# Patient Record
Sex: FEMALE | Race: WHITE | NOT HISPANIC OR LATINO | Employment: OTHER | URBAN - METROPOLITAN AREA
[De-identification: names, ages, dates, MRNs, and addresses within clinical notes are randomized per-mention and may not be internally consistent; named-entity substitution may affect disease eponyms.]

---

## 2015-04-20 LAB
ALT SERPL-CCNC: 25 U/L (ref 6–29)
AST SERPL-CCNC: 18 U/L (ref 10–35)
CHOLEST SERPL-MCNC: 175 MG/DL (ref 125–200)
CREAT SERPL-MCNC: 1.08 MG/DL (ref 0.5–0.99)
GFR SERPL CREATININE-BSD FRML MDRD: 55 ML/MIN/1.73M2
GLUCOSE SERPL-MCNC: 101 MG/DL (ref 65–99)
HDLC SERPL-MCNC: 87 MG/DL
LDLC SERPL CALC-MCNC: 64 MG/DL
NONHDLC SERPL-MCNC: 88 MG/DL
POTASSIUM SERPL-SCNC: 4.6 MMOL/L (ref 3.5–5.3)
TRIGL SERPL-MCNC: 121 MG/DL
TSH SERPL-ACNC: 1.05 MIU/L (ref 0.4–4.5)

## 2015-10-21 LAB
ALT SERPL-CCNC: 25 U/L (ref 6–29)
AST SERPL-CCNC: 22 U/L (ref 10–35)
CHOLEST SERPL-MCNC: 171 MG/DL (ref 125–200)
CREAT SERPL-MCNC: 1.06 MG/DL (ref 0.5–0.99)
GFR SERPL CREATININE-BSD FRML MDRD: 55 ML/MIN/1.73M2
GLUCOSE SERPL-MCNC: 100 MG/DL (ref 65–99)
HDLC SERPL-MCNC: 86 MG/DL
LDLC SERPL CALC-MCNC: 66 MG/DL
NONHDLC SERPL-MCNC: 85 MG/DL
POTASSIUM SERPL-SCNC: 4.5 MMOL/L (ref 3.5–5.3)
TRIGL SERPL-MCNC: 97 MG/DL
TSH SERPL-ACNC: 1.77 MIU/L (ref 0.4–4.5)

## 2016-03-03 LAB
ALT SERPL-CCNC: 24 U/L (ref 6–29)
AST SERPL-CCNC: 19 U/L (ref 10–35)
CHOLEST SERPL-MCNC: 170 MG/DL (ref 125–200)
CREAT SERPL-MCNC: 1 MG/DL (ref 0.5–0.99)
GFR SERPL CREATININE-BSD FRML MDRD: 59 ML/MIN/1.73M2
GLUCOSE SERPL-MCNC: 106 MG/DL (ref 65–99)
HDLC SERPL-MCNC: 92 MG/DL
LDLC SERPL CALC-MCNC: 59 MG/DL
NONHDLC SERPL-MCNC: 78 MG/DL
POTASSIUM SERPL-SCNC: 4.7 MMOL/L (ref 3.5–5.3)
TRIGL SERPL-MCNC: 97 MG/DL
TSH SERPL-ACNC: 1.6 MIU/L (ref 0.4–4.5)

## 2016-07-29 LAB
ALT SERPL-CCNC: 21 U/L (ref 6–29)
AST SERPL-CCNC: 18 U/L (ref 10–35)
CHOLEST SERPL-MCNC: 185 MG/DL (ref 125–200)
CREAT SERPL-MCNC: 1 MG/DL (ref 0.5–0.99)
GFR SERPL CREATININE-BSD FRML MDRD: 59 ML/MIN/1.73M2
GLUCOSE SERPL-MCNC: 103 MG/DL (ref 65–99)
HDLC SERPL-MCNC: 93 MG/DL
LDLC SERPL CALC-MCNC: 68 MG/DL
NONHDLC SERPL-MCNC: 92 MG/DL
POTASSIUM SERPL-SCNC: 4.6 MMOL/L (ref 3.5–5.3)
TRIGL SERPL-MCNC: 118 MG/DL
TSH SERPL-ACNC: 1.04 MIU/L (ref 0.4–4.5)

## 2016-10-14 LAB
ALT SERPL-CCNC: 19 U/L (ref 6–29)
AST SERPL-CCNC: 15 U/L (ref 10–35)
CHOLEST SERPL-MCNC: 170 MG/DL (ref 125–200)
CREAT SERPL-MCNC: 1.03 MG/DL (ref 0.5–0.99)
GFR SERPL CREATININE-BSD FRML MDRD: 57 ML/MIN/1.73M2
GLUCOSE SERPL-MCNC: 102 MG/DL (ref 65–99)
HDLC SERPL-MCNC: 80 MG/DL
LDLC SERPL CALC-MCNC: 69 MG/DL
NONHDLC SERPL-MCNC: 90 MG/DL
POTASSIUM SERPL-SCNC: 4.4 MMOL/L (ref 3.5–5.3)
TRIGL SERPL-MCNC: 106 MG/DL

## 2016-11-29 LAB
ALT SERPL-CCNC: 17 U/L (ref 6–29)
AST SERPL-CCNC: 17 U/L (ref 10–35)
CHOLEST SERPL-MCNC: 185 MG/DL (ref 125–200)
CREAT SERPL-MCNC: 1.09 MG/DL (ref 0.5–0.99)
GFR SERPL CREATININE-BSD FRML MDRD: 53 ML/MIN/1.73M2
GLUCOSE SERPL-MCNC: 101 MG/DL (ref 65–99)
HDLC SERPL-MCNC: 90 MG/DL
LDLC SERPL CALC-MCNC: 72 MG/DL
NONHDLC SERPL-MCNC: 95 MG/DL
POTASSIUM SERPL-SCNC: 4.5 MMOL/L (ref 3.5–5.3)
TRIGL SERPL-MCNC: 116 MG/DL
TSH SERPL-ACNC: 1.37 MIU/L (ref 0.4–4.5)

## 2017-02-10 ENCOUNTER — TRANSFERRED RECORDS (OUTPATIENT)
Dept: HEALTH INFORMATION MANAGEMENT | Facility: CLINIC | Age: 67
End: 2017-02-10

## 2017-03-30 LAB
ALT SERPL-CCNC: 21 U/L (ref 6–29)
AST SERPL-CCNC: 18 U/L (ref 10–35)
CHOLEST SERPL-MCNC: 155 MG/DL (ref 125–200)
CREAT SERPL-MCNC: 1.07 MG/DL (ref 0.5–0.99)
GFR SERPL CREATININE-BSD FRML MDRD: 54 ML/MIN/1.73M2
GLUCOSE SERPL-MCNC: 105 MG/DL (ref 65–99)
HDLC SERPL-MCNC: 84 MG/DL
LDLC SERPL CALC-MCNC: 48 MG/DL
NONHDLC SERPL-MCNC: 71 MG/DL
POTASSIUM SERPL-SCNC: 4.6 MMOL/L (ref 3.5–5.3)
TRIGL SERPL-MCNC: 116 MG/DL
TSH SERPL-ACNC: 1.87 MIU/L (ref 0.4–4.5)

## 2017-07-10 LAB
ALT SERPL-CCNC: 27 U/L (ref 6–29)
AST SERPL-CCNC: 19 U/L (ref 10–35)
CHOLEST SERPL-MCNC: 142 MG/DL (ref 125–200)
CREAT SERPL-MCNC: 1.08 MG/DL (ref 0.5–0.99)
GFR SERPL CREATININE-BSD FRML MDRD: 54 ML/MIN/1.73M2
GLUCOSE SERPL-MCNC: 105 MG/DL (ref 65–99)
HDLC SERPL-MCNC: 84 MG/DL
LDLC SERPL CALC-MCNC: 42 MG/DL
NONHDLC SERPL-MCNC: 58 MG/DL
POTASSIUM SERPL-SCNC: 4.6 MMOL/L (ref 3.5–5.3)
TRIGL SERPL-MCNC: 78 MG/DL
TSH SERPL-ACNC: 1.21 MIU/L (ref 0.4–4.5)

## 2017-11-17 LAB
ALT SERPL-CCNC: 20 U/L (ref 6–29)
AST SERPL-CCNC: 16 U/L (ref 10–35)
CHOLEST SERPL-MCNC: 173 MG/DL
CREAT SERPL-MCNC: 1.06 MG/DL (ref 0.5–0.99)
GFR SERPL CREATININE-BSD FRML MDRD: 55 ML/MIN/1.73M2
GLUCOSE SERPL-MCNC: 95 MG/DL (ref 65–99)
HDLC SERPL-MCNC: 85 MG/DL
LDLC SERPL CALC-MCNC: 70 MG/DL
NONHDLC SERPL-MCNC: 88 MG/DL
POTASSIUM SERPL-SCNC: 4.6 MMOL/L (ref 3.5–5.3)
TRIGL SERPL-MCNC: 96 MG/DL
TSH SERPL-ACNC: 1.38 MIU/L (ref 0.4–4.5)

## 2018-03-22 ENCOUNTER — TRANSFERRED RECORDS (OUTPATIENT)
Dept: HEALTH INFORMATION MANAGEMENT | Facility: CLINIC | Age: 68
End: 2018-03-22

## 2018-05-17 LAB
ALT SERPL-CCNC: 21 U/L (ref 6–29)
AST SERPL-CCNC: 20 U/L (ref 10–35)
CHOLEST SERPL-MCNC: 161 MG/DL
CREAT SERPL-MCNC: 1.1 MG/DL (ref 0.5–0.99)
GFR SERPL CREATININE-BSD FRML MDRD: 52 ML/MIN/1.73M2
GLUCOSE SERPL-MCNC: 101 MG/DL (ref 65–99)
HDLC SERPL-MCNC: 81 MG/DL
LDLC SERPL CALC-MCNC: 64 MG/DL
NONHDLC SERPL-MCNC: 80 MG/DL
POTASSIUM SERPL-SCNC: 4.3 MMOL/L (ref 3.5–5.3)
TRIGL SERPL-MCNC: 77 MG/DL
TSH SERPL-ACNC: 1.72 MIU/L (ref 0.4–4.5)

## 2018-11-06 LAB
ALT SERPL-CCNC: 22 U/L (ref 6–29)
AST SERPL-CCNC: 22 U/L (ref 10–35)
CHOLEST SERPL-MCNC: 193 MG/DL
CREAT SERPL-MCNC: 1.03 MG/DL (ref 0.5–0.99)
GFR SERPL CREATININE-BSD FRML MDRD: 56 ML/MIN/1.73M2
GLUCOSE SERPL-MCNC: 101 MG/DL (ref 65–99)
HDLC SERPL-MCNC: 87 MG/DL
LDLC SERPL CALC-MCNC: 87 MG/DL
NONHDLC SERPL-MCNC: 106 MG/DL
POTASSIUM SERPL-SCNC: 4.7 MMOL/L (ref 3.5–5.3)
TRIGL SERPL-MCNC: 98 MG/DL
TSH SERPL-ACNC: 1.4 MIU/L (ref 0.4–4.5)

## 2019-01-17 LAB
CREAT SERPL-MCNC: 1.3 MG/DL (ref 0.4–1.1)
GFR SERPL CREATININE-BSD FRML MDRD: 41 ML/MIN
GLUCOSE SERPL-MCNC: 115 MG/DL (ref 70–100)
POTASSIUM SERPL-SCNC: 4.4 MMOL/L (ref 3.5–5.1)

## 2019-01-23 ENCOUNTER — TRANSFERRED RECORDS (OUTPATIENT)
Dept: HEALTH INFORMATION MANAGEMENT | Facility: CLINIC | Age: 69
End: 2019-01-23

## 2019-03-06 LAB
ALBUMIN (URINE) MG/SPEC: 0.3 MG/DL
ALBUMIN/CREATININE RATIO: 3 MCG/MG CREAT
ALT SERPL-CCNC: 23 U/L (ref 6–29)
AST SERPL-CCNC: 20 U/L (ref 10–35)
CHOLEST SERPL-MCNC: 177 MG/DL
CREAT SERPL-MCNC: 0.93 MG/DL (ref 0.5–0.99)
CREATININE (URINE): 120 MG/DL
GFR SERPL CREATININE-BSD FRML MDRD: 64 ML/MIN/1.73M2
GLUCOSE SERPL-MCNC: 106 MG/DL (ref 65–99)
HBA1C MFR BLD: 5.9 % (ref 0–5.7)
HDLC SERPL-MCNC: 85 MG/DL
LDLC SERPL CALC-MCNC: 67 MG/DL
NONHDLC SERPL-MCNC: 92 MG/DL
POTASSIUM SERPL-SCNC: 4.7 MMOL/L (ref 3.5–5.3)
TRIGL SERPL-MCNC: 186 MG/DL
TSH SERPL-ACNC: 2.06 MIU/L (ref 0.4–4.5)

## 2019-03-25 ENCOUNTER — TRANSFERRED RECORDS (OUTPATIENT)
Dept: HEALTH INFORMATION MANAGEMENT | Facility: CLINIC | Age: 69
End: 2019-03-25

## 2019-07-09 LAB
ALBUMIN (URINE) MG/SPEC: 0.2 MG/DL
ALBUMIN/CREATININE RATIO: 2
ALT SERPL-CCNC: 15 U/L (ref 6–29)
AST SERPL-CCNC: 14 U/L (ref 10–35)
CHOLEST SERPL-MCNC: 163 MG/DL
CREAT SERPL-MCNC: 1.01 MG/DL (ref 0.5–0.99)
CREATININE (URINE): 105 MG/DL (ref 20–275)
GFR SERPL CREATININE-BSD FRML MDRD: 58 ML/MIN/1.73M2
GLUCOSE SERPL-MCNC: 107 MG/DL (ref 65–99)
HBA1C MFR BLD: 6 % (ref 0–5.7)
HDLC SERPL-MCNC: 79 MG/DL
LDLC SERPL CALC-MCNC: 66 MG/DL
NONHDLC SERPL-MCNC: 84 MG/DL
POTASSIUM SERPL-SCNC: 4.8 MMOL/L (ref 3.5–5.3)
TRIGL SERPL-MCNC: 93 MG/DL
TSH SERPL-ACNC: 1.32 MIU/L (ref 0.4–4.5)

## 2019-11-11 ENCOUNTER — TRANSFERRED RECORDS (OUTPATIENT)
Dept: HEALTH INFORMATION MANAGEMENT | Facility: CLINIC | Age: 69
End: 2019-11-11

## 2019-11-20 ENCOUNTER — TRANSFERRED RECORDS (OUTPATIENT)
Dept: HEALTH INFORMATION MANAGEMENT | Facility: CLINIC | Age: 69
End: 2019-11-20

## 2019-11-20 LAB
ALBUMIN (URINE) MG/SPEC: 0.2 MG/DL
ALBUMIN/CREATININE RATIO: 2 MCG/MG CREAT
ALT SERPL-CCNC: 18 U/L (ref 6–29)
AST SERPL-CCNC: 16 U/L (ref 10–35)
CHOLEST SERPL-MCNC: 168 MG/DL
CREAT SERPL-MCNC: 1.03 MG/DL (ref 0.5–0.99)
CREATININE (URINE): 98 MG/DL (ref 20–275)
GFR SERPL CREATININE-BSD FRML MDRD: 56 ML/MIN/1.73M2
GLUCOSE SERPL-MCNC: 112 MG/DL (ref 65–99)
HBA1C MFR BLD: 5.9 % (ref 0–5.7)
HDLC SERPL-MCNC: 81 MG/DL
LDLC SERPL CALC-MCNC: 66 MG/DL
NONHDLC SERPL-MCNC: 87 MG/DL
POTASSIUM SERPL-SCNC: 4.8 MMOL/L (ref 3.5–5.3)
TRIGL SERPL-MCNC: 124 MG/DL

## 2019-11-25 ENCOUNTER — TRANSFERRED RECORDS (OUTPATIENT)
Dept: HEALTH INFORMATION MANAGEMENT | Facility: CLINIC | Age: 69
End: 2019-11-25

## 2020-01-24 NOTE — PATIENT INSTRUCTIONS
Preventive Health Recommendations    See your health care provider every year to    Review health changes.     Discuss preventive care.      Review your medicines if your doctor has prescribed any.      You no longer need a yearly Pap test unless you've had an abnormal Pap test in the past 10 years. If you have vaginal symptoms, such as bleeding or discharge, be sure to talk with your provider about a Pap test.      Every 1 to 2 years, have a mammogram.  If you are over 69, talk with your health care provider about whether or not you want to continue having screening mammograms.      Every 10 years, have a colonoscopy. Or, have a yearly FIT test (stool test). These exams will check for colon cancer.       Have a cholesterol test every 5 years, or more often if your doctor advises it.       Have a diabetes test (fasting glucose) every three years. If you are at risk for diabetes, you should have this test more often.       At age 65, have a bone density scan (DEXA) to check for osteoporosis (brittle bone disease).    Shots:    Get a flu shot each year.    Get a tetanus shot every 10 years.    Talk to your doctor about your pneumonia vaccines. There are now two you should receive - Pneumovax (PPSV 23) and Prevnar (PCV 13).    Talk to your pharmacist about the shingles vaccine.    Talk to your doctor about the hepatitis B vaccine.    Nutrition:     Eat at least 5 servings of fruits and vegetables each day.      Eat whole-grain bread, whole-wheat pasta and brown rice instead of white grains and rice.      Get adequate about Calcium and Vitamin D.     Lifestyle    Exercise at least 150 minutes a week (30 minutes a day, 5 days a week). This will help you control your weight and prevent disease.      Limit alcohol to one drink per day.      No smoking.       Wear sunscreen to prevent skin cancer.       See your dentist twice a year for an exam and cleaning.      See your eye doctor every 1 to 2 years to screen for  conditions such as glaucoma, macular degeneration, cataracts, etc.    Personalized Prevention Plan  You are due for the preventive services outlined below.  Your care team is available to assist you in scheduling these services.  If you have already completed any of these items, please share that information with your care team to update in your medical record.    Health Maintenance Due   Topic Date Due     Osteoporosis Screening  1950     Hepatitis C Screening  1950     Discuss Advance Care Planning  1950     Mammogram  1950     Diptheria Tetanus Pertussis (DTAP/TDAP/TD) Vaccine (1 - Tdap) 10/16/1962     Cholesterol Lab  10/16/1996     Zoster (Shingles) Vaccine (1 of 2) 10/16/2001     Annual Wellness Visit  10/16/2016     FALL RISK ASSESSMENT  10/16/2016     Pneumococcal Vaccine (1 of 2 - PCV13) 10/16/2016     Flu Vaccine (1) 09/01/2019     PHQ-2  01/01/2020       Recommend Miralax one capful in am daily for constipation.     Will call you with plan of care.   Patient Education

## 2020-01-24 NOTE — PROGRESS NOTES
"   SUBJECTIVE:   CC: Shelly Carcamo is an 69 year old woman who presents for preventive health visit.     HPI  {Add if <65 person on Medicare  - Required Questions (Optional):867797}  {Outside tests to abstract? :092221}    {additional problems to add (Optional):671415}    Today's PHQ-2 Score: No flowsheet data found.    Abuse: Current or Past(Physical, Sexual or Emotional)- { :758506}  Do you feel safe in your environment? { :001396}    Have you ever done Advance Care Planning? (For example, a Health Directive, POLST, or a discussion with a medical provider or your loved ones about your wishes): { :608059}    Social History     Tobacco Use     Smoking status: Not on file   Substance Use Topics     Alcohol use: Not on file     {Rooming Staff- Complete this question if Prescreen response is not shown below for today's visit. If you drink alcohol do you typically have >3 drinks per day or >7 drinks per week? (Optional):077674}    No flowsheet data found.{add AUDIT responses (Optional) (A score of 7 for adult men is an indication of hazardous drinking; a score of 8 or more is an indication of an alcohol use disorder.  A score of 7 or more for adult women is an indication of hazardous drinking or an alchohol use disorder):388519}    Reviewed orders with patient.  Reviewed health maintenance and updated orders accordingly - { :291310::\"Yes\"}  {Chronicprobdata (optional):013456}    {Mammo Decision Support (Optional):676270}    Pertinent mammograms are reviewed under the imaging tab.  History of abnormal Pap smear: { :314379}     Reviewed and updated as needed this visit by clinical staff         Reviewed and updated as needed this visit by Provider        {HISTORY OPTIONS (Optional):782357}    Review of Systems  {FEMALE ROS (Optional):398455}     OBJECTIVE:   There were no vitals taken for this visit.  Physical Exam  {Exam Choices (Optional):167539}    {Diagnostic Test Results (Optional):685864::\"Diagnostic Test " "Results:\",\"Labs reviewed in Epic\"}    ASSESSMENT/PLAN:   {Diag Picklist:752392}    COUNSELING:  {FEMALE COUNSELING MESSAGES:721499::\"Reviewed preventive health counseling, as reflected in patient instructions\"}    There is no height or weight on file to calculate BMI.    {Weight Management Plan (ACO) Complete if BMI is abnormal-  Ages 18-64  BMI >24.9.  Age 65+ with BMI <23 or >30 (Optional):555562}     has no history on file for tobacco.  {Tobacco Cessation -- Complete if patient is a smoker (Optional):014467}    Counseling Resources:  ATP IV Guidelines  Pooled Cohorts Equation Calculator  Breast Cancer Risk Calculator  FRAX Risk Assessment  ICSI Preventive Guidelines  Dietary Guidelines for Americans, 2010  USDA's MyPlate  ASA Prophylaxis  Lung CA Screening    THERESA [facility]  "

## 2020-01-29 ENCOUNTER — OFFICE VISIT (OUTPATIENT)
Dept: FAMILY MEDICINE | Facility: OTHER | Age: 70
End: 2020-01-29
Payer: MEDICARE

## 2020-01-29 ENCOUNTER — TELEPHONE (OUTPATIENT)
Dept: FAMILY MEDICINE | Facility: OTHER | Age: 70
End: 2020-01-29

## 2020-01-29 VITALS
WEIGHT: 216 LBS | RESPIRATION RATE: 16 BRPM | SYSTOLIC BLOOD PRESSURE: 124 MMHG | BODY MASS INDEX: 36.88 KG/M2 | HEART RATE: 78 BPM | TEMPERATURE: 98 F | OXYGEN SATURATION: 98 % | HEIGHT: 64 IN | DIASTOLIC BLOOD PRESSURE: 80 MMHG

## 2020-01-29 DIAGNOSIS — Z00.00 ENCOUNTER FOR MEDICARE ANNUAL WELLNESS EXAM: ICD-10-CM

## 2020-01-29 DIAGNOSIS — E78.00 HIGH CHOLESTEROL: ICD-10-CM

## 2020-01-29 DIAGNOSIS — E55.9 VITAMIN D DEFICIENCY: ICD-10-CM

## 2020-01-29 DIAGNOSIS — Z00.00 ENCOUNTER FOR MEDICAL EXAMINATION TO ESTABLISH CARE: Primary | ICD-10-CM

## 2020-01-29 DIAGNOSIS — F51.04 PSYCHOPHYSIOLOGICAL INSOMNIA: ICD-10-CM

## 2020-01-29 DIAGNOSIS — I10 ESSENTIAL HYPERTENSION: ICD-10-CM

## 2020-01-29 DIAGNOSIS — E53.8 VITAMIN B12 DEFICIENCY: ICD-10-CM

## 2020-01-29 DIAGNOSIS — M51.369 DDD (DEGENERATIVE DISC DISEASE), LUMBAR: ICD-10-CM

## 2020-01-29 DIAGNOSIS — Z00.00 ROUTINE GENERAL MEDICAL EXAMINATION AT A HEALTH CARE FACILITY: ICD-10-CM

## 2020-01-29 DIAGNOSIS — E66.01 MORBID OBESITY (H): ICD-10-CM

## 2020-01-29 DIAGNOSIS — M47.26 OSTEOARTHRITIS OF SPINE WITH RADICULOPATHY, LUMBAR REGION: ICD-10-CM

## 2020-01-29 PROCEDURE — 99203 OFFICE O/P NEW LOW 30 MIN: CPT | Mod: 25 | Performed by: NURSE PRACTITIONER

## 2020-01-29 PROCEDURE — G0438 PPPS, INITIAL VISIT: HCPCS | Performed by: NURSE PRACTITIONER

## 2020-01-29 RX ORDER — ZOLPIDEM TARTRATE 5 MG/1
5 TABLET ORAL
COMMUNITY
End: 2020-06-08

## 2020-01-29 RX ORDER — LOSARTAN POTASSIUM 50 MG/1
100 TABLET ORAL DAILY
COMMUNITY
End: 2020-02-27

## 2020-01-29 RX ORDER — PAROXETINE 10 MG/1
10 TABLET, FILM COATED ORAL EVERY MORNING
COMMUNITY
End: 2020-03-02

## 2020-01-29 RX ORDER — ATORVASTATIN CALCIUM 20 MG/1
20 TABLET, FILM COATED ORAL DAILY
COMMUNITY
End: 2020-02-27

## 2020-01-29 RX ORDER — PANTOPRAZOLE SODIUM 40 MG/1
40 TABLET, DELAYED RELEASE ORAL DAILY
COMMUNITY
End: 2020-02-27

## 2020-01-29 RX ORDER — CARVEDILOL 6.25 MG/1
6.25 TABLET ORAL 2 TIMES DAILY WITH MEALS
COMMUNITY
End: 2020-02-27

## 2020-01-29 RX ORDER — ASPIRIN 81 MG/1
81 TABLET ORAL DAILY
COMMUNITY
End: 2022-03-10

## 2020-01-29 ASSESSMENT — MIFFLIN-ST. JEOR: SCORE: 1494.39

## 2020-01-29 ASSESSMENT — ENCOUNTER SYMPTOMS
FEVER: 0
DIZZINESS: 0
CONSTIPATION: 1
EYE PAIN: 0
COUGH: 0
HEMATURIA: 0
NERVOUS/ANXIOUS: 0
ABDOMINAL PAIN: 1
DIARRHEA: 0
CHILLS: 0
HEMATOCHEZIA: 0

## 2020-01-29 ASSESSMENT — ACTIVITIES OF DAILY LIVING (ADL): CURRENT_FUNCTION: NO ASSISTANCE NEEDED

## 2020-01-29 NOTE — PROGRESS NOTES
"SUBJECTIVE:   Shelly Carcamo is a 69 year old female who presents for Preventive Visit.  Are you in the first 12 months of your Medicare coverage?  No    Patient presents to establish care and to have physical.   She is due for a physical she states she had a mammogram she thinks 11/2019 that was normal. She had a hysterectomy this past year has ovaries in place.  Also had dexa bone scan this was normal.   States she has arthritis in low back uses Tramadol 37.5/325 mg as needed for pain at bedtime sleep DDD lumbar. Will need CSA.   She uses Ambien for sleep 5 mg she states she takes both together for years.     Healthy Habits:     In general, how would you rate your overall health?  Good    Frequency of exercise:  4-5 days/week    Duration of exercise:  15-30 minutes    Do you usually eat at least 4 servings of fruit and vegetables a day, include whole grains    & fiber and avoid regularly eating high fat or \"junk\" foods?  No    Taking medications regularly:  Yes    Medication side effects:  None    Ability to successfully perform activities of daily living:  No assistance needed    Home Safety:  No safety concerns identified    Hearing Impairment:  No hearing concerns    In the past 6 months, have you been bothered by leaking of urine? Yes    In general, how would you rate your overall mental or emotional health?  Good      PHQ-2 Total Score: 0    Additional concerns today:  Yes    Do you feel safe in your environment? Yes    Have you ever done Advance Care Planning? (For example, a Health Directive, POLST, or a discussion with a medical provider or your loved ones about your wishes): Yes, patient states has an Advance Care Planning document and will bring a copy to the clinic.      Fall risk  Fallen 2 or more times in the past year?: (P) No  Any fall with injury in the past year?: (P) No    Cognitive Screening   1) Repeat 3 items (Leader, Season, Table)    2) Clock draw: NORMAL  3) 3 item recall: Recalls 2 objects "   Results: NORMAL clock, 1-2 items recalled: COGNITIVE IMPAIRMENT LESS LIKELY    Mini-CogTM Copyright SHEFALI Anderson. Licensed by the author for use in [facility]; reprinted with permission (judd@[facility]). All rights reserved.      Do you have sleep apnea, excessive snoring or daytime drowsiness?: states snores sleep study was negative for apnea per patient. test was 2017    Reviewed and updated as needed this visit by clinical staff  Allergies  Meds         Reviewed and updated as needed this visit by Provider        Social History     Tobacco Use     Smoking status: Never Smoker     Smokeless tobacco: Never Used   Substance Use Topics     Alcohol use: Yes     Comment: socially on occasion         Alcohol Use 1/29/2020   Prescreen: >3 drinks/day or >7 drinks/week? No               Current providers sharing in care for this patient include:   Patient Care Team:  Bridget Holder APRN CNP as PCP - General (Nurse Practitioner - Family)    The following health maintenance items are reviewed in Epic and correct as of today:  Health Maintenance   Topic Date Due     DEXA  1950     HEPATITIS C SCREENING  1950     ADVANCE CARE PLANNING  1950     MAMMO SCREENING  1950     DTAP/TDAP/TD IMMUNIZATION (1 - Tdap) 10/16/1962     LIPID  10/16/1996     ZOSTER IMMUNIZATION (1 of 2) 10/16/2001     MEDICARE ANNUAL WELLNESS VISIT  10/16/2016     FALL RISK ASSESSMENT  10/16/2016     PNEUMOCOCCAL IMMUNIZATION 65+ LOW/MEDIUM RISK (1 of 2 - PCV13) 10/16/2016     INFLUENZA VACCINE (1) 09/01/2019     PHQ-2  01/01/2020     COLONOSCOPY  02/10/2027     IPV IMMUNIZATION  Aged Out     MENINGITIS IMMUNIZATION  Aged Out     Lab work is in process  Labs reviewed in EPIC  BP Readings from Last 3 Encounters:   01/29/20 124/80    Wt Readings from Last 3 Encounters:   01/29/20 98 kg (216 lb)                  Patient Active Problem List   Diagnosis     High cholesterol     HTN (hypertension)     Vitamin B12  "deficiency     Past Surgical History:   Procedure Laterality Date     HYSTERECTOMY       HYSTERECTOMY, PAP NO LONGER INDICATED         Social History     Tobacco Use     Smoking status: Never Smoker     Smokeless tobacco: Never Used   Substance Use Topics     Alcohol use: Yes     Comment: socially on occasion     No family history on file.      Current Outpatient Medications   Medication Sig Dispense Refill     aspirin 81 MG EC tablet Take 81 mg by mouth daily       atorvastatin (LIPITOR) 20 MG tablet Take 20 mg by mouth daily       carvedilol (COREG) 6.25 MG tablet Take 6.25 mg by mouth 2 times daily (with meals)       losartan (COZAAR) 50 MG tablet Take 100 mg by mouth daily       pantoprazole (PROTONIX) 40 MG EC tablet Take 40 mg by mouth daily       PARoxetine (PAXIL) 10 MG tablet Take 10 mg by mouth every morning       traMADol-acetaminophen (ULTRACET) 37.5-325 MG tablet Take 1 tablet by mouth every 4 hours       zolpidem (AMBIEN) 5 MG tablet Take 5 mg by mouth nightly as needed for sleep       No Known Allergies  No lab results found.       Review of Systems   Constitutional: Negative for chills and fever.   HENT: Negative for congestion and ear pain.    Eyes: Negative for pain.   Respiratory: Negative for cough.    Cardiovascular: Negative for chest pain.   Gastrointestinal: Positive for abdominal pain and constipation. Negative for diarrhea and hematochezia.   Genitourinary: Negative for hematuria.   Neurological: Negative for dizziness.   Psychiatric/Behavioral: The patient is not nervous/anxious.          OBJECTIVE:   /80   Pulse 78   Temp 98  F (36.7  C) (Temporal)   Resp 16   Ht 1.625 m (5' 3.98\")   Wt 98 kg (216 lb)   SpO2 98%   BMI 37.10 kg/m   Estimated body mass index is 37.1 kg/m  as calculated from the following:    Height as of this encounter: 1.625 m (5' 3.98\").    Weight as of this encounter: 98 kg (216 lb).  Physical Exam  GENERAL APPEARANCE: healthy, alert and no distress  EYES: " Eyes grossly normal to inspection, PERRL and conjunctivae and sclerae normal  HENT: ear canals and TM's normal, nose and mouth without ulcers or lesions, oropharynx clear and oral mucous membranes moist  NECK: no adenopathy, no asymmetry, masses, or scars and thyroid normal to palpation  RESP: lungs clear to auscultation - no rales, rhonchi or wheezes  BREAST: normal without masses, tenderness or nipple discharge and no palpable axillary masses or adenopathy  CV: regular rate and rhythm, normal S1 S2, no S3 or S4, no murmur, click or rub, no peripheral edema and peripheral pulses strong  ABDOMEN: soft, nontender, no hepatosplenomegaly, no masses and bowel sounds normal  MS: no musculoskeletal defects are noted and gait is age appropriate without ataxia  SKIN: no suspicious lesions or rashes  NEURO: Normal strength and tone, sensory exam grossly normal, mentation intact and speech normal  PSYCH: mentation appears normal and affect normal/bright        ASSESSMENT / PLAN:   1. Encounter for medical examination to establish care  EFRAIN previously sent pending medical records.     2. Routine general medical examination at a health care facility  Reviewed recommended screenings and ordered appropriate testing for pt's risks and per pt's request(s).   Pending records for further labs needed  - Lipid panel reflex to direct LDL Fasting; Future  - **Basic metabolic panel FUTURE 2mo; Future    3. High cholesterol    - Lipid panel reflex to direct LDL Fasting; Future    4. Essential hypertension    - **Basic metabolic panel FUTURE 2mo; Future    5. Vitamin B12 deficiency    - Vitamin B12; Future    6. Vitamin D deficiency    - Vitamin D Deficiency; Future    7. DDD (degenerative disc disease), lumbar      8. Osteoarthritis of spine with radiculopathy, lumbar region      9. Psychophysiological insomnia  7-9 discussed will need to review records she has not had rx filled in [place] discussed CSA and UDS as indicated with Tramadol she  "may need additional appointment for this.   Discussed use of both Ambien and Tramadol congruently with risks including respiratory depression.       COUNSELING:  Reviewed preventive health counseling, as reflected in patient instructions       Regular exercise       Healthy diet/nutrition       Vision screening       Dental care       Immunizations    UTD             Osteoporosis Prevention/Bone Health    Estimated body mass index is 37.1 kg/m  as calculated from the following:    Height as of this encounter: 1.625 m (5' 3.98\").    Weight as of this encounter: 98 kg (216 lb).    Weight management plan: Discussed healthy diet and exercise guidelines     reports that she has never smoked. She has never used smokeless tobacco.      Appropriate preventive services were discussed with this patient, including applicable screening as appropriate for cardiovascular disease, diabetes, osteopenia/osteoporosis, and glaucoma.  As appropriate for age/gender, discussed screening for colorectal cancer, prostate cancer, breast cancer, and cervical cancer. Checklist reviewing preventive services available has been given to the patient.    Reviewed patients plan of care and provided an AVS. The Basic Care Plan (routine screening as documented in Health Maintenance) for Shelly meets the Care Plan requirement. This Care Plan has been established and reviewed with the Patient.    Counseling Resources:  ATP IV Guidelines  Pooled Cohorts Equation Calculator  Breast Cancer Risk Calculator  FRAX Risk Assessment  ICSI Preventive Guidelines  Dietary Guidelines for Americans, 2010  USDA's MyPlate  ASA Prophylaxis  Lung CA Screening    HTERESA Han [facility]    Identified Health Risks:  "

## 2020-01-29 NOTE — TELEPHONE ENCOUNTER
Reason for Call:  Other call back    Detailed comments: Patient called clinic asking for Brandy in [place] to contact Dr. Hao Bates's office tp re-request her records. She said she should ask for Maisha in the medical records department. EFRAIN in chart was faxed, but issue with records not being received on our end.     Phone Number Patient can be reached at: Home number on file [phone] (home)    Best Time: any    Can we leave a detailed message on this number? YES    Call taken on 1/29/2020 at 4:32 PM by Octaviano Verma

## 2020-02-04 ENCOUNTER — TELEPHONE (OUTPATIENT)
Dept: FAMILY MEDICINE | Facility: OTHER | Age: 70
End: 2020-02-04

## 2020-02-04 NOTE — PROGRESS NOTES
Subjective     Shelly Carcamo is a 69 year old female who presents to clinic today for the following health issues:    History of Present Illness        She eats 0-1 servings of fruits and vegetables daily.She consumes 0 sweetened beverage(s) daily.She exercises with enough effort to increase her heart rate 20 to 29 minutes per day.  She exercises with enough effort to increase her heart rate 3 or less days per week.   She is taking medications regularly.       Records Review  Pt is back in office now that we have gotten records from her [place] provider.     Lipid panel, CMP, HGB A1C 5.9, CBC  completed 11/20/19 vitamin D level 3/6/19 was low at 28 she is on a supplement for this with Ca++ with Vitamin D   Would like to redo these labs in May 2020  Dexa completed in 11/25/19 normal bone scan  Mammogram 3/25/19 normal    Has been having difficulty swallowing however this has improved with Protonix she was planning EGD through her past PCP but feels she no longer needs this     She does use Ultracet 37.5/325 mg 1 every 4 hours as needed for pain she states she uses she states 1-2 times per week at HS.   She is also using Ambien 5 mg we did discus not using these two medication congruently she does not take together.      She had cataracts in August 2015 she will need follow up with optometry in Aug. 2020        Patient Active Problem List   Diagnosis     High cholesterol     HTN (hypertension)     Vitamin B12 deficiency     Obesity (BMI 35.0-39.9) with comorbidity (H)     Vitamin D deficiency     DDD (degenerative disc disease), lumbar     Osteoarthritis of spine with radiculopathy, lumbar region     Psychophysiological insomnia     Gastroesophageal reflux disease with esophagitis     Controlled substance agreement signed     Elevated hemoglobin A1c     Past Surgical History:   Procedure Laterality Date     CATARACT IOL, RT/LT Bilateral      HYSTERECTOMY       HYSTERECTOMY, PAP NO LONGER INDICATED         Social  History     Tobacco Use     Smoking status: Never Smoker     Smokeless tobacco: Never Used   Substance Use Topics     Alcohol use: Yes     Comment: socially on occasion     Family History   Problem Relation Age of Onset     Pancreatic Cancer Mother      ALS Father      Atrial fibrillation Brother          Current Outpatient Medications   Medication Sig Dispense Refill     aspirin 81 MG EC tablet Take 81 mg by mouth daily       atorvastatin (LIPITOR) 20 MG tablet Take 20 mg by mouth daily       carvedilol (COREG) 6.25 MG tablet Take 6.25 mg by mouth 2 times daily (with meals)       losartan (COZAAR) 50 MG tablet Take 100 mg by mouth daily       pantoprazole (PROTONIX) 40 MG EC tablet Take 40 mg by mouth daily       PARoxetine (PAXIL) 10 MG tablet Take 10 mg by mouth every morning       traMADol-acetaminophen (ULTRACET) 37.5-325 MG tablet Take 1 tablet by mouth every 4 hours       zolpidem (AMBIEN) 5 MG tablet Take 5 mg by mouth nightly as needed for sleep       No Known Allergies  No lab results found.   BP Readings from Last 3 Encounters:   02/10/20 126/80   01/29/20 124/80    Wt Readings from Last 3 Encounters:   02/10/20 99.3 kg (219 lb)   01/29/20 98 kg (216 lb)                    Reviewed and updated as needed this visit by Provider  Problems         Review of Systems   ROS COMP: Constitutional, HEENT, cardiovascular, pulmonary, GI, , musculoskeletal, neuro, skin, endocrine and psych systems are negative, except as otherwise noted.      Objective    /80   Pulse 76   Temp 98  F (36.7  C) (Temporal)   Resp 16   Wt 99.3 kg (219 lb)   SpO2 100%   BMI 37.62 kg/m    Body mass index is 37.62 kg/m .  Physical Exam   GENERAL: healthy, alert and no distress  NECK: no adenopathy, no asymmetry, masses, or scars and thyroid normal to palpation  RESP: lungs clear to auscultation - no rales, rhonchi or wheezes  CV: regular rate and rhythm, normal S1 S2, no S3 or S4, no murmur, click or rub, no peripheral edema  and peripheral pulses strong  MS: no gross musculoskeletal defects noted, no edema         Assessment & Plan   1. Medical history reviewed with no changes     2. Encounter for screening mammogram for breast cancer  Scheduled today she is due in March negative history of abnormal mammo's denies symptoms today  - MA Screening Digital Bilateral; Future    3. Vitamin B12 deficiency  H/o this she is not taking supplements at this time. Is asymptomatic.     4. Vitamin D deficiency  She does take supplement for this will be due for lab in May this is ordered.     5. High cholesterol  Is taking Lipitor 20 mg is stable she denies s/e to medication she is not due for refill at this time will be due for cholesterol panel in May this is ordered.     6. Essential hypertension  Stable she is taking Cozaar 50 mg and Carvedilol 6.25 mg denies s/e she does not need refills at this time     7. Psychophysiological insomnia    She is taking Ambien 5 mg occasionally 1-2 times per week tolerating without s/e does not take congruently with Tramadol.     8. Obesity (BMI 35.0-39.9) with comorbidity (H)  Discussed lifestyle recommendations    9. DDD (degenerative disc disease), lumbar  Stable she is taking Tramadol occasionally 1 tablet at hs for severe lumbar pain CSA signed today.     10. Osteoarthritis of spine with radiculopathy, lumbar region  See #8    11. Controlled substance agreement signed    See # 8  12. Elevated hemoglobin A1c  Due for recheck in May 2020 this is ordered.   - **A1C FUTURE 3mo; Future    13. Gastroesophageal reflux disease with esophagitis    Stable protonix 40 mg daily denies s/e     She is not due for any refills today. Labs and ov in May.        Patient Instructions   Please complete Mammogram for March.   Labs and office visit in May.     Call for refills.     Thank you  Bridget Holder [facility]

## 2020-02-04 NOTE — TELEPHONE ENCOUNTER
Called patient appt scheduled for 2/10/2020 with lab visit for fasting labs.     Ottoniel Sosa MA on 2/4/2020 at 11:14 AM

## 2020-02-10 ENCOUNTER — OFFICE VISIT (OUTPATIENT)
Dept: FAMILY MEDICINE | Facility: OTHER | Age: 70
End: 2020-02-10
Payer: MEDICARE

## 2020-02-10 VITALS
WEIGHT: 219 LBS | HEART RATE: 76 BPM | OXYGEN SATURATION: 100 % | DIASTOLIC BLOOD PRESSURE: 80 MMHG | TEMPERATURE: 98 F | BODY MASS INDEX: 37.62 KG/M2 | RESPIRATION RATE: 16 BRPM | SYSTOLIC BLOOD PRESSURE: 126 MMHG

## 2020-02-10 DIAGNOSIS — E66.01 MORBID OBESITY (H): ICD-10-CM

## 2020-02-10 DIAGNOSIS — R73.09 ELEVATED HEMOGLOBIN A1C: ICD-10-CM

## 2020-02-10 DIAGNOSIS — M47.26 OSTEOARTHRITIS OF SPINE WITH RADICULOPATHY, LUMBAR REGION: ICD-10-CM

## 2020-02-10 DIAGNOSIS — F51.04 PSYCHOPHYSIOLOGICAL INSOMNIA: ICD-10-CM

## 2020-02-10 DIAGNOSIS — E55.9 VITAMIN D DEFICIENCY: ICD-10-CM

## 2020-02-10 DIAGNOSIS — M51.369 DDD (DEGENERATIVE DISC DISEASE), LUMBAR: ICD-10-CM

## 2020-02-10 DIAGNOSIS — E53.8 VITAMIN B12 DEFICIENCY: ICD-10-CM

## 2020-02-10 DIAGNOSIS — Z12.31 ENCOUNTER FOR SCREENING MAMMOGRAM FOR BREAST CANCER: ICD-10-CM

## 2020-02-10 DIAGNOSIS — K21.00 GASTROESOPHAGEAL REFLUX DISEASE WITH ESOPHAGITIS: ICD-10-CM

## 2020-02-10 DIAGNOSIS — E78.00 HIGH CHOLESTEROL: ICD-10-CM

## 2020-02-10 DIAGNOSIS — I10 ESSENTIAL HYPERTENSION: ICD-10-CM

## 2020-02-10 DIAGNOSIS — Z79.899 CONTROLLED SUBSTANCE AGREEMENT SIGNED: ICD-10-CM

## 2020-02-10 PROCEDURE — 99214 OFFICE O/P EST MOD 30 MIN: CPT | Performed by: NURSE PRACTITIONER

## 2020-02-10 NOTE — PATIENT INSTRUCTIONS
Please complete Mammogram for March.   Labs and office visit in May.     Call for refills.     Thank you  Bridget Holder CNP

## 2020-02-10 NOTE — LETTER
[facility]  02/10/20    Patient: Shelly Carcamo  YOB: 1950  Medical Record Number: [id number]                                                                  Opioid / Opioid Plus Controlled Substance Agreement    I understand that my care provider has prescribed an opioid (narcotic) controlled substance to help manage my condition(s). I am taking this medicine to help me function or work. I know this is strong medicine, and that it can cause serious side effects. Opioid medicine can be sedating, addicting and may cause a dependency on the drug. They can affect my ability to drive or think, and cause depression. They need to be taken exactly as prescribed. Combining opioids with certain medicines or chemicals (such as cocaine, sedatives and tranquilizers, sleeping pills, meth) can be dangerous or even fatal. Also, if I stop opioids suddenly, I may have severe withdrawal symptoms. Last, I understand that opioids do not work for all types of pain nor for all patients. If not helpful, I may be asked to stop them.    For Tramadol/Acetomenophin 37.5/325 mg 1-2 tabs per day as needed for severe lumbar pain with radiculopathy count 30/mos  Also Ambien 5 mg count 30/mos. Not to take congruently with Tramadol.    The risks, benefits, and side effects of these medicine(s) were explained to me. I agree that:    1. I will take part in other treatments as advised by my care team. This may be psychiatry or counseling, physical therapy, behavioral therapy, group treatment or a referral to a pain clinic. I will reduce or stop my medicine when my care team tells me to do so.  2. I will take my medicines as prescribed. I will not change the dose or schedule unless my care team tells me to. There will be no refills if I  run out early.   I may be contactedwithout warning and asked to complete a urine drug test or pill count at any time.   3. I will keep all my appointments, and understand this is part of  the monitoring of opioids. My care team may require an office visit for EVERY opioid/controlled substance refill. If I miss appointments or don t follow instructions, my care team may stop my medicine.  4. I will not ask other providers to prescribe controlled substances, and I will not accept controlled substances from other people. If I need another prescribed controlled substance for a new reason, I will tell my care team within 1 business day.  5. I will use one pharmacy to fill all of my controlled substance prescriptions, and it is up to me to make sure that I do not run out of my medicines on weekends or holidays. If my care team is willing to refill my opioid prescription without a visit, I must request refills only during office hours, refills may take up to 3 days to process, and it may take up to 5 to 7 days for my medicine to be mailed and ready at my pharmacy. Prescriptions will not be mailed anywhere except my pharmacy.        [id number]  Rev 12/18         Registration to scan to EHR                             Page 1 of 2               Controlled Substance Agreement Opioid        [facility]  02/10/20  Patient: Shelly Carcamo  YOB: 1950  Medical Record Number: [id number]                                                                  6. I am responsible for my prescriptions. If the medicine/prescription is lost or stolen, it will not be replaced. I also agree not to share controlled substance medicines with anyone.  7. I agree to not use ANY illegal or recreational drugs. This includes marijuana, cocaine, bath salts or other drugs. I agree not to use alcohol unless my care team says I may.          I agree to give urine samples whenever asked. If I don t give a urine sample, the care team may stop my medicine.    8. If I enroll in the [place] Medical Marijuana program, I will tell my care team. I will also sign an agreement to share my medical records with my care team.   9. I  will bring in my list of medicines (or my medicine bottles) each time I come to the clinic.   10. I will tell my care team right away if I become pregnant or have a new medical problem treated outside of my regular clinic.  11. I understand that this medicine can affect my thinking and judgment. It may be unsafe for me to drive, use machinery and do dangerous tasks. I will not do any of these things until I know how the medicine affects me. If my dose changes, I will wait to see how it affects me. I will contact my care team if I have concerns about medicine side effects.    I understand that if I do not follow any of the conditions above, my prescriptions or treatment may be stopped.      I agree that my provider, clinic care team, and pharmacy may work with any city, state or federal law enforcement agency that investigates the misuse, sale, or other diversion of my controlled medicine. I will allow my provider to discuss my care with or share a copy of this agreement with any other treating provider, pharmacy or emergency room where I receive care. I agree to give up (waive) any right of privacy or confidentiality with respect to these consents.     I have read this agreement and have asked questions about anything I did not understand.      ________________________________________________________________________  Patient signature - Date/Time -  Shelly Carcamo                                      ________________________________________________________________________  Witness signature                                                            ________________________________________________________________________  Provider signature - THERESA Han CNP      [id number]  Rev 12/18         Registration to scan to EHR                         Page 2 of 2                   Controlled Substance Agreement Opioid           Page 1 of 2  Opioid Pain Medicines (also known as Narcotics)  What You Need to Know    What  are opioids?   Opioids are pain medicines that must be prescribed by a doctor.  They are also known as narcotics.    Examples are:     morphine (MS Contin, Erica)    oxycodone (Oxycontin)    oxycodone and acetaminophen (Percocet)    hydrocodone and acetaminophen (Vicodin, Norco)     fentanyl patch (Duragesic)     hydromorphone (Dilaudid)     methadone     What do opioids do well?   Opioids are best for short-term pain after a surgery or injury. They also work well for cancer pain. Unlike other pain medicines, they do not cause liver or kidney failure or ulcers. They may help some people with long-lasting (chronic) pain.     What do opioids NOT do well?   Opioids never get rid of pain entirely, and they do not work well for most patients with chronic pain. Opioids do not reduce swelling, one of the causes of pain. They also don t work well for nerve pain.                           For informational purposes only.  Not to replace the advice of your care provider.  Copyright 201 [facility]. All right reserved. [place] 014529-Osj 02/18.      Page 2 of 2    Risks and side effects   Talk to your doctor before you start or decide to keep taking one of these medicines. Side effects include:    Lowering your breathing rate enough to cause death    Overdose, including death, especially if taking higher than prescribed doses    Long-term opioid use    Worse depression symptoms; less pleasure in things you usually enjoy    Feeling tired or sluggish    Slower thoughts or cloudy thinking    Being more sensitive to pain over time; pain is harder to control    Trouble sleeping or restless sleep    Changes in hormone levels (for example, less testosterone)    Changes in sex drive or ability to have sex    Constipation    Unsafe driving    Itching and sweating    Feeling dizzy    Nausea, vomiting and dry mouth    What else should I know about opioids?  When someone takes opioids for too long or too often, they  become dependent. This means that if you stop or reduce the medicine too quickly, you will have withdrawal symptoms.    Dependence is not the same as addiction. Addiction is when people keep using a substance that harms their body, their mind or their relations with others. If you have a history of drug or alcohol abuse, taking opioids can cause a relapse.    Over time, opioids don t work as well. Most people will need higher and higher doses. The higher the dose, the more serious the side effects. We don t know the long-term effects of opioids.      Prescribed opioids aren't the best way to manage chronic pain    Other ways to manage pain include:      Ibuprofen or acetaminophen.  You should always try this first.      Treat health problems that may be causing pain.      acupuncture or massage, deep breathing, meditation, visual imagery, aromatherapy.      Use heat or ice at the pain site      Physical therapy and exercise      Stop smoking      See a counselor or therapist                                                  People who have used opioids for a long time may have a lower quality of life, worse depression, higher levels of pain and more visits to doctors.    Never share your opioids with others. Be sure to store opioids in a secure place, locked if possible.Young children can easily swallow them and overdose.     You can overdose on opioids.  Signs of overdose include decrease or loss of consciousness, slowed breathing, trouble waking and blue lips.  If someone is worried about overdose, they should call 911.    If you are at risk for overdose, you may get naloxone (Narcan, a medicine that reverses the effects of opioids.  If you overdose, a friend or family member can give you Narcan while waiting for the ambulance.  They need to know the signs of overdose and how to give Narcan.    While you're taking opioids:    Don't use alcohol or street drugs. Taking them together can cause death.    Don't take any  of these medicines unless your doctor says its okay.  Taking these with opioids can cause death.    Benzodiazepines (such as lorazepam         or diazepam)    Muscle relaxers (such as cyclobenzaprine)    sleeping pills    other opioids    Safe disposal of opioids  Find your area drug take-back program, your pharmacy mail-back program, buy a special disposal bag (such as Deterra) from your pharmacy or flush them down the toilet.  Use the guidelines at:  www.fda.gov/drugs/resourcesforyou

## 2020-02-27 DIAGNOSIS — K21.00 GASTROESOPHAGEAL REFLUX DISEASE WITH ESOPHAGITIS: ICD-10-CM

## 2020-02-27 DIAGNOSIS — I10 ESSENTIAL HYPERTENSION: ICD-10-CM

## 2020-02-27 DIAGNOSIS — E78.2 MIXED HYPERLIPIDEMIA: Primary | ICD-10-CM

## 2020-02-27 NOTE — TELEPHONE ENCOUNTER
Pending Prescriptions:                       Disp   Refills    atorvastatin (LIPITOR) 20 MG tablet                        Sig: Take 1 tablet (20 mg) by mouth daily    carvedilol (COREG) 6.25 MG tablet                          Sig: Take 1 tablet (6.25 mg) by mouth 2 times daily (with           meals)    losartan (COZAAR) 50 MG tablet                             Sig: Take 2 tablets (100 mg) by mouth daily    pantoprazole (PROTONIX) 40 MG EC tablet                    Sig: Take 1 tablet (40 mg) by mouth daily    Routing refill request to provider for review/approval because:  Medication is reported/historical

## 2020-02-27 NOTE — TELEPHONE ENCOUNTER
Reason for Call:  Medication or medication refill:    Do you use a [facility]?  Name of the pharmacy and phone number for the current request:  [facility] - [phone]    Name of the medication requested:  Atorvastatin 20mg, carvedilol 6.25 mg, losartan 50mg,  And pantoprazole 40mg.    Other request: none    Can we leave a detailed message on this number? YES    Phone number patient can be reached at: Cell number on file:    No relevant phone numbers on file.    [phone]       Best Time: any    Call taken on 2/27/2020 at 10:48 AM by Lorin Humphrey

## 2020-02-28 RX ORDER — ATORVASTATIN CALCIUM 20 MG/1
20 TABLET, FILM COATED ORAL DAILY
Qty: 90 TABLET | Refills: 1 | Status: SHIPPED | OUTPATIENT
Start: 2020-02-28 | End: 2020-09-01

## 2020-02-28 RX ORDER — PANTOPRAZOLE SODIUM 40 MG/1
40 TABLET, DELAYED RELEASE ORAL DAILY
Qty: 90 TABLET | Refills: 1 | Status: SHIPPED | OUTPATIENT
Start: 2020-02-28 | End: 2020-09-01

## 2020-02-28 RX ORDER — LOSARTAN POTASSIUM 50 MG/1
100 TABLET ORAL DAILY
Qty: 180 TABLET | Refills: 1 | Status: SHIPPED | OUTPATIENT
Start: 2020-02-28 | End: 2020-09-01

## 2020-02-28 RX ORDER — CARVEDILOL 6.25 MG/1
6.25 TABLET ORAL 2 TIMES DAILY WITH MEALS
Qty: 180 TABLET | Refills: 1 | Status: SHIPPED | OUTPATIENT
Start: 2020-02-28 | End: 2020-09-01

## 2020-02-28 NOTE — TELEPHONE ENCOUNTER
Left message for pt to return call, when call is returned give information below.      Liv Bernabe CMA ([facility])

## 2020-05-12 ENCOUNTER — HOSPITAL ENCOUNTER (OUTPATIENT)
Dept: MAMMOGRAPHY | Facility: CLINIC | Age: 70
Discharge: HOME | End: 2020-05-12
Attending: NURSE PRACTITIONER | Admitting: NURSE PRACTITIONER
Payer: MEDICARE

## 2020-05-12 DIAGNOSIS — Z12.31 VISIT FOR SCREENING MAMMOGRAM: ICD-10-CM

## 2020-05-12 PROCEDURE — 77067 SCR MAMMO BI INCL CAD: CPT

## 2020-06-08 ENCOUNTER — TELEPHONE (OUTPATIENT)
Dept: FAMILY MEDICINE | Facility: OTHER | Age: 70
End: 2020-06-08

## 2020-06-08 DIAGNOSIS — M47.26 OSTEOARTHRITIS OF SPINE WITH RADICULOPATHY, LUMBAR REGION: ICD-10-CM

## 2020-06-08 DIAGNOSIS — G47.00 INSOMNIA, UNSPECIFIED TYPE: Primary | ICD-10-CM

## 2020-06-08 RX ORDER — ZOLPIDEM TARTRATE 5 MG/1
5 TABLET ORAL
Qty: 30 TABLET | Refills: 3 | Status: SHIPPED | OUTPATIENT
Start: 2020-06-08 | End: 2021-01-17

## 2020-06-08 NOTE — TELEPHONE ENCOUNTER
Reason for Call:  Medication or medication refill:    Do you use a [facility]?  Name of the pharmacy and phone number for the current request:  [facility] - [phone]    Name of the medication requested: ZOLPIDEMTARTRATE 5MG(AMBIEN), TRAMADOL-ACETAMINOPHN 37.    Other request: Patient is calling in today to refill her medications. Patient stated that she was seen in January and got refills except she didn't need them at the time.     Can we leave a detailed message on this number? YES    Phone number patient can be reached at: Cell number on file:    Telephone Information:   Mobile [phone]       Best Time: any    Call taken on 6/8/2020 at 11:24 AM by Gely Hernandez

## 2020-07-01 ENCOUNTER — TELEPHONE (OUTPATIENT)
Dept: FAMILY MEDICINE | Facility: OTHER | Age: 70
End: 2020-07-01

## 2020-07-01 NOTE — TELEPHONE ENCOUNTER
Summary:    Patient is due/failing the following:   Follow up and fasting labs    Action needed:   Patient needs office visit for follow up.    Type of outreach:    Phone, spoke to patient.  patient scheduled    Questions for provider review:    None                                                                                                                                    Sirena Dubon CMA       Chart routed to Care Team .

## 2020-07-08 DIAGNOSIS — R73.09 ELEVATED HEMOGLOBIN A1C: ICD-10-CM

## 2020-07-08 DIAGNOSIS — E78.00 HIGH CHOLESTEROL: ICD-10-CM

## 2020-07-08 DIAGNOSIS — E53.8 VITAMIN B12 DEFICIENCY: ICD-10-CM

## 2020-07-08 DIAGNOSIS — E55.9 VITAMIN D DEFICIENCY: ICD-10-CM

## 2020-07-08 DIAGNOSIS — I10 ESSENTIAL HYPERTENSION: ICD-10-CM

## 2020-07-08 DIAGNOSIS — E66.01 MORBID OBESITY (H): ICD-10-CM

## 2020-07-08 DIAGNOSIS — Z00.00 ROUTINE GENERAL MEDICAL EXAMINATION AT A HEALTH CARE FACILITY: ICD-10-CM

## 2020-07-08 LAB
ALBUMIN SERPL-MCNC: 3.6 G/DL (ref 3.4–5)
ALP SERPL-CCNC: 47 U/L (ref 40–150)
ALT SERPL W P-5'-P-CCNC: 27 U/L (ref 0–50)
ANION GAP SERPL CALCULATED.3IONS-SCNC: 5 MMOL/L (ref 3–14)
AST SERPL W P-5'-P-CCNC: 18 U/L (ref 0–45)
BILIRUB SERPL-MCNC: 0.4 MG/DL (ref 0.2–1.3)
BUN SERPL-MCNC: 21 MG/DL (ref 7–30)
CALCIUM SERPL-MCNC: 8.9 MG/DL (ref 8.5–10.1)
CHLORIDE SERPL-SCNC: 107 MMOL/L (ref 94–109)
CHOLEST SERPL-MCNC: 175 MG/DL
CO2 SERPL-SCNC: 28 MMOL/L (ref 20–32)
CREAT SERPL-MCNC: 1.22 MG/DL (ref 0.52–1.04)
GFR SERPL CREATININE-BSD FRML MDRD: 45 ML/MIN/{1.73_M2}
GLUCOSE SERPL-MCNC: 107 MG/DL (ref 70–99)
HBA1C MFR BLD: 5.8 % (ref 0–5.6)
HDLC SERPL-MCNC: 89 MG/DL
LDLC SERPL CALC-MCNC: 65 MG/DL
NONHDLC SERPL-MCNC: 86 MG/DL
POTASSIUM SERPL-SCNC: 4.3 MMOL/L (ref 3.4–5.3)
PROT SERPL-MCNC: 7.5 G/DL (ref 6.8–8.8)
SODIUM SERPL-SCNC: 140 MMOL/L (ref 133–144)
TRIGL SERPL-MCNC: 106 MG/DL
VIT B12 SERPL-MCNC: 1420 PG/ML (ref 193–986)

## 2020-07-08 PROCEDURE — 36415 COLL VENOUS BLD VENIPUNCTURE: CPT | Performed by: NURSE PRACTITIONER

## 2020-07-08 PROCEDURE — 82306 VITAMIN D 25 HYDROXY: CPT | Performed by: NURSE PRACTITIONER

## 2020-07-08 PROCEDURE — 83036 HEMOGLOBIN GLYCOSYLATED A1C: CPT | Performed by: NURSE PRACTITIONER

## 2020-07-08 PROCEDURE — 82607 VITAMIN B-12: CPT | Performed by: NURSE PRACTITIONER

## 2020-07-08 PROCEDURE — 80061 LIPID PANEL: CPT | Performed by: NURSE PRACTITIONER

## 2020-07-08 PROCEDURE — 80053 COMPREHEN METABOLIC PANEL: CPT | Performed by: NURSE PRACTITIONER

## 2020-07-08 NOTE — PROGRESS NOTES
"Shelly Carcamo is a 69 year old female who is being evaluated via a billable video visit.      The patient has been notified of following:     \"This video visit will be conducted via a call between you and your physician/provider. We have found that certain health care needs can be provided without the need for an in-person physical exam.  This service lets us provide the care you need with a video conversation.  If a prescription is necessary we can send it directly to your pharmacy.  If lab work is needed we can place an order for that and you can then stop by our lab to have the test done at a later time.    Video visits are billed at different rates depending on your insurance coverage.  Please reach out to your insurance provider with any questions.    If during the course of the call the physician/provider feels a video visit is not appropriate, you will not be charged for this service.\"    Patient has given verbal consent for Video visit? Yes  How would you like to obtain your AVS? Mail a copy  Patient would like the video invitation sent by: Text to cell phone: [phone]  Will anyone else be joining your video visit? No    Subjective     Shelly Carcamo is a 69 year old female who presents today via video visit for the following health issues:    HPI     Patient is following up on lab results.   Patient would like to discuss sleep study    Patient states that she is currently taking B vitamin and has this in her vitamin that she takes daily.     States  is c/o her snoring.            Video Start Time: 1108        Patient Active Problem List   Diagnosis     High cholesterol     HTN (hypertension)     Vitamin B12 deficiency     Obesity (BMI 35.0-39.9) with comorbidity (H)     Vitamin D deficiency     DDD (degenerative disc disease), lumbar     Osteoarthritis of spine with radiculopathy, lumbar region     Psychophysiological insomnia     Gastroesophageal reflux disease with esophagitis     Controlled " substance agreement signed     Elevated hemoglobin A1c     Past Surgical History:   Procedure Laterality Date     CATARACT IOL, RT/LT Bilateral      CATARACT IOL, RT/LT Bilateral 2015     HYSTERECTOMY       HYSTERECTOMY, PAP NO LONGER INDICATED         Social History     Tobacco Use     Smoking status: Never Smoker     Smokeless tobacco: Never Used   Substance Use Topics     Alcohol use: Yes     Comment: socially on occasion     Family History   Problem Relation Age of Onset     Pancreatic Cancer Mother      ALS Father      Atrial fibrillation Brother          Current Outpatient Medications   Medication Sig Dispense Refill     aspirin 81 MG EC tablet Take 81 mg by mouth daily       atorvastatin (LIPITOR) 20 MG tablet Take 1 tablet (20 mg) by mouth daily 90 tablet 1     carvedilol (COREG) 6.25 MG tablet Take 1 tablet (6.25 mg) by mouth 2 times daily (with meals) 180 tablet 1     losartan (COZAAR) 50 MG tablet Take 2 tablets (100 mg) by mouth daily 180 tablet 1     pantoprazole (PROTONIX) 40 MG EC tablet Take 1 tablet (40 mg) by mouth daily 90 tablet 1     PARoxetine (PAXIL) 10 MG tablet Take 1 tablet (10 mg) by mouth every morning 90 tablet 1     traMADol-acetaminophen (ULTRACET) 37.5-325 MG tablet Take 1 tablet by mouth every 4 hours 30 tablet 0     zolpidem (AMBIEN) 5 MG tablet Take 1 tablet (5 mg) by mouth nightly as needed for sleep 30 tablet 3     No Known Allergies  Recent Labs   Lab Test 07/08/20  0730 11/20/19 07/09/19 03/06/19   A1C 5.8* 5.9* 6.0* 5.9*   LDL 65 66 66 67   HDL 89 81 79 85   TRIG 106 124 93 186*   ALT 27 18 15 23   CR 1.22* 1.03* 1.01* 0.93   GFRESTIMATED 45* 56* 58* 64   GFRESTBLACK 52* 65 67 74   POTASSIUM 4.3 4.8 4.8 4.7   TSH  --   --  1.32 2.06      BP Readings from Last 3 Encounters:   02/10/20 126/80   01/29/20 124/80    Wt Readings from Last 3 Encounters:   02/10/20 99.3 kg (219 lb)   01/29/20 98 kg (216 lb)                    Reviewed and updated as needed this visit by Provider          Review of Systems   Constitutional, HEENT, cardiovascular, pulmonary, GI, , musculoskeletal, neuro, skin, endocrine and psych systems are negative, except as otherwise noted.      Objective             Physical Exam     GENERAL: Healthy, alert and no distress  EYES: Eyes grossly normal to inspection.  No discharge or erythema, or obvious scleral/conjunctival abnormalities.  RESP: No audible wheeze, cough, or visible cyanosis.  No visible retractions or increased work of breathing.    MS: extremities normal- no gross deformities noted  SKIN: Visible skin clear. No significant rash, abnormal pigmentation or lesions.  NEURO: Cranial nerves grossly intact.  Mentation and speech appropriate for age.  PSYCH: Mentation appears normal, affect normal/bright, judgement and insight intact, normal speech and appearance well-groomed.           Assessment & Plan     1. Prediabetes  Recommend rechecking labs in future see result note.   - **Basic metabolic panel FUTURE 2mo; Future  - **A1C FUTURE 3mo; Future    2. Snoring  Referral place patient scheduled  - SLEEP EVALUATION & MANAGEMENT REFERRAL - [facility] [phone] (Age 13 and up if over 100 lbs); Future    3. Psychophysiological insomnia    - SLEEP EVALUATION & MANAGEMENT REFERRAL - [facility] [phone] (Age 13 and up if over 100 lbs); Future    4. Apnea    - SLEEP EVALUATION & MANAGEMENT REFERRAL - [place] [phone] (Age 13 and up if over 100 lbs); Future     Home care instructions were reviewed with the patient. The risks, benefits and treatment options of prescribed medications or other treatments have been discussed with the patient. The patient verbalized their understanding and should call or follow up if no improvement or if they develop further problems.    No follow-ups on file.    THERESA Han [facility]      Video-Visit  Details    Type of service:  Video Visit    Video End Time:1120    Originating Location (pt. Location): Home    Distant Location (provider location):  [facility]     Platform used for Video Visit: Doximity    No follow-ups on file.       THERESA Han CNP

## 2020-07-09 LAB — DEPRECATED CALCIDIOL+CALCIFEROL SERPL-MC: 47 UG/L (ref 20–75)

## 2020-07-10 NOTE — RESULT ENCOUNTER NOTE
Please advise Shelly Fountain,  1950, that her lab results were normal vitamin D level, your B 12 level is elevated this is likely an indication of recent ingestion of this vitamin this is excreted through your kidneys and is not harmful. Your electrolytes are normal mild elevation of glucose at 107 (normal is 70-99), your liver function is normal. Kidney function is worsening recommend we keep a close eye on this and recheck in 2-3 mos. Cholesterol panel is normal (great!), your HGB A1C is 5.8 this is prediabetic. We can discuss all these more during your office visit.   [phone] (home)   Thank you  Bridget Holder CNP

## 2020-07-13 ENCOUNTER — VIRTUAL VISIT (OUTPATIENT)
Dept: FAMILY MEDICINE | Facility: OTHER | Age: 70
End: 2020-07-13
Payer: MEDICARE

## 2020-07-13 DIAGNOSIS — F51.04 PSYCHOPHYSIOLOGICAL INSOMNIA: ICD-10-CM

## 2020-07-13 DIAGNOSIS — R73.03 PREDIABETES: Primary | ICD-10-CM

## 2020-07-13 DIAGNOSIS — R06.83 SNORING: ICD-10-CM

## 2020-07-13 DIAGNOSIS — R06.81 APNEA: ICD-10-CM

## 2020-07-13 PROCEDURE — 99214 OFFICE O/P EST MOD 30 MIN: CPT | Mod: 95 | Performed by: NURSE PRACTITIONER

## 2020-07-13 NOTE — PROGRESS NOTES
"Shelly Carcamo is a 69 year old female who is being evaluated via a billable video visit.      The patient has been notified of following:     \"This video visit will be conducted via a call between you and your physician/provider. We have found that certain health care needs can be provided without the need for an in-person physical exam.  This service lets us provide the care you need with a video conversation.  If a prescription is necessary we can send it directly to your pharmacy.  If lab work is needed we can place an order for that and you can then stop by our lab to have the test done at a later time.    Video visits are billed at different rates depending on your insurance coverage.  Please reach out to your insurance provider with any questions.    If during the course of the call the physician/provider feels a video visit is not appropriate, you will not be charged for this service.\"    Patient has given verbal consent for Video visit? Yes  How would you like to obtain your AVS? Mail a copy  Patient would like the video invitation sent by: Text to cell phone: [phone]  Will anyone else be joining your video visit? No        Video-Visit Details    Type of service:  Video Visit    Video Start Time: 1:01 PM  Video End Time: 1:46 PM    Originating Location (pt. Location): Home    Distant Location (provider location):  [place] [facility]     Platform used for Video Visit: Osmany James MD      Does Shelly have a CPAP/Bipap?  No       [place] Sleep Scale: 11    "

## 2020-07-14 ENCOUNTER — VIRTUAL VISIT (OUTPATIENT)
Dept: SLEEP MEDICINE | Facility: CLINIC | Age: 70
End: 2020-07-14
Attending: NURSE PRACTITIONER
Payer: MEDICARE

## 2020-07-14 DIAGNOSIS — R06.83 SNORING: ICD-10-CM

## 2020-07-14 DIAGNOSIS — R06.81 APNEA: ICD-10-CM

## 2020-07-14 DIAGNOSIS — R29.818 SUSPECTED SLEEP APNEA: Primary | ICD-10-CM

## 2020-07-14 DIAGNOSIS — F51.04 PSYCHOPHYSIOLOGICAL INSOMNIA: ICD-10-CM

## 2020-07-14 PROCEDURE — 99204 OFFICE O/P NEW MOD 45 MIN: CPT | Mod: 95 | Performed by: INTERNAL MEDICINE

## 2020-07-14 NOTE — PATIENT INSTRUCTIONS
SLEEP HYGIENE TIPS:    1. Don t go to bed unless you are sleepy.   If you are not sleepy at bedtime, then do something else. Read a book, listen to soft music or browse through a magazine. Find something relaxing, but not stimulating, to take your mind off of worries about sleep. This will relax your body and distract your mind.     2. If you are not asleep after 20 minutes, then get out of the bed.   Find something else to do that will make you feel relaxed. If you can, do this in another room. Your bedroom should be where you go to sleep. It is not a place to go when you are bored. Once you feel sleepy again, go back to bed.     3. Begin rituals that help you relax each night before bed.   This can include such things as a warm bath, light snack or a few minutes of reading.     4. Get up at the same time every morning.   Do this even on weekends and holidays.     5. Get a full night s sleep on a regular basis.   Get enough sleep so that you feel well-rested nearly every day.     6. Avoid taking naps if you can.   If you must take a nap, try to keep it short (less than one hour). Never take a nap after 3 p.m.     7. Keep a regular schedule.   Regular times for meals, medications, chores, and other activities help keep the inner body clock running smoothly.     8. Don t read, write, eat, watch TV, talk on the phone, or play cards in bed.     9. Do not have any caffeine after lunch.     10. Do not have a beer, a glass of wine, or any other alcohol within six hours of your bedtime.     11. Do not have a cigarette or any other source of nicotine before bedtime.     12. Do not go to bed hungry, but don t eat a big meal near bedtime either.     13. Avoid any tough exercise within six hours of your bedtime.   You should exercise on a regular basis, but do it earlier in the day. (Talk to your doctor before you begin an exercise program.)     14. Avoid sleeping pills, or use them cautiously.   Most doctors do not prescribe  sleeping pills for periods of more than three weeks. Do not drink alcohol while taking sleeping pills.     15. Try to get rid of or deal with things that make you worry.   If you are unable to do this, then find a time during the day to get all of your worries out of your system. Your bed is a place to rest, not a place to worry.     16. Make your bedroom quiet, dark, and a little bit cool.   An easy way to remember this: it should remind you of a cave. While this may not sound romantic, it seems to work for bats. Bats are champion sleepers. They get about 16 hours of sleep each day. Maybe it s because they sleep in dark, cool caves.       Stress, tension, and anxiety can be big factors contributing to insomnia.  If you can t relax your mind and body, then you won t be able to sleep.  You would likely benefit from trying some of the relaxation exercises that we ve assembled below.  These are all internet based links.  If you don t have or use a computer, you may benefit from one of the stress management books listed below that you can get at your public library or at a local bookstore for a relatively low price.      Copy and paste into web browser address window   https://www.[employer].com/watch?v=Ahub6hoAtPw    Progressive Muscle Relaxation (PMR):     http://www.[employer]/progressive-muscle-relaxation-exercise.html     http://studentsupport.[facility]/counseling/resources/self-help/relaxation-and-stress-management/   Deep Breathing Exercises:    http://www.[employer]/breathing-awareness.html     Meditation:     www.[employer]    www.theHeartThisguidedHeartThismeditation-site.com You may have to pay for some of these resources.    Guided Imagery:    http://www.[employer]/guided-imagery-scripts.html     http://[employer]/library/qxspiyvrrn-kautfz-aysfzuq/      Your BMI is There is no height or weight on file to calculate BMI.  Weight management is a personal decision.  If  you are interested in exploring weight loss strategies, the following discussion covers the approaches that may be successful. Body mass index (BMI) is one way to tell whether you are at a healthy weight, overweight, or obese. It measures your weight in relation to your height.  A BMI of 18.5 to 24.9 is in the healthy range. A person with a BMI of 25 to 29.9 is considered overweight, and someone with a BMI of 30 or greater is considered obese. More than two-thirds of American adults are considered overweight or obese.  Being overweight or obese increases the risk for further weight gain. Excess weight may lead to heart disease and diabetes.  Creating and following plans for healthy eating and physical activity may help you improve your health.  Weight control is part of healthy lifestyle and includes exercise, emotional health, and healthy eating habits. Careful eating habits lifelong are the mainstay of weight control. Though there are significant health benefits from weight loss, long-term weight loss with diet alone may be very difficult to achieve- studies show long-term success with dietary management in less than 10% of people. Attaining a healthy weight may be especially difficult to achieve in those with severe obesity. In some cases, medications, devices and surgical management might be considered.  What can you do?  If you are overweight or obese and are interested in methods for weight loss, you should discuss this with your provider.     Consider reducing daily calorie intake by 500 calories.     Keep a food journal.     Avoiding skipping meals, consider cutting portions instead.    Diet combined with exercise helps maintain muscle while optimizing fat loss. Strength training is particularly important for building and maintaining muscle mass. Exercise helps reduce stress, increase energy, and improves fitness. Increasing exercise without diet control, however, may not burn enough calories to loose  weight.       Start walking three days a week 10-20 minutes at a time    Work towards walking thirty minutes five days a week     Eventually, increase the speed of your walking for 1-2 minutes at time    In addition, we recommend that you review healthy lifestyles and methods for weight loss available through the National Institutes of Health patient information sites:  http://win.niddk.nih.gov/publications/index.htm    And look into health and wellness programs that may be available through your health insurance provider, employer, local community center, or nikia club.    Weight management plan: Patient was referred to their PCP to discuss a diet and exercise plan.     Drive Safe... Drive Alive     Sleep health profoundly affects your health, mood, and your safety. 33% of the population (one in three of us) is not getting enough sleep and many have a sleep disorder. Not getting enough sleep or having an untreated / undertreated sleep condition may make us sleepy without even knowing it. In fact, our driving could be dramatically impaired due to our sleep health. As your provider, here are some things I would like you to know about driving:     Here are some warning signs for impairment and dangerous drowsy driving:              -Having been awake more than 16 hours               -Looking tired               -Eyelid drooping              -Head nodding (it could be too late at this point)              -Driving for more than 30 minutes     Some things you could do to make the driving safer if you are experiencing some drowsiness:              -Stop driving and rest              -Call for transportation              -Make sure your sleep disorder is adequately treated     Some things that have been shown NOT to work when experiencing drowsiness while driving:              -Turning on the radio              -Opening windows              -Eating any  distracting  /  entertaining  foods (e.g., sunflower seeds, candy, or  any other)              -Talking on the phone      Your decision may not only impact your life, but also the life of others. Please, remember to drive safe for yourself and all of us.    Melchor James MD

## 2020-07-15 NOTE — PROGRESS NOTES
SLEEP MEDICINE VIRTUAL CLINIC NOTE   [facility]  Shelly Carcamo 69 year old female  : 1950  MRN: [id number]      PRIMARY CARE PROVIDER: Bridget Holder    Visit Date:   2020      REASON FOR VISIT:  Evaluation of sleep apnea and difficulty initiating sleep.      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  She has been experiencing these for a number of years.  She reports that about 6 years ago she underwent a polysomnography while she was in [place].  She was advised to use a mandibular advancement device, which she did for a few years.  This has helped her with snoring, however, she discontinued the use given significant dry mouth associated with the dental appliance.  She also has been taking Zolpidem 5 mg about twice per week for the last 6 years for difficulty initiating sleep.      The patient recently moved from [place] to [place] to be with her family after her  retired.      She describes her current routine as going to bed around 10:00 p.m.  She usually likes to read for about half an hour, either before getting into bed or while in the bed and then she falls asleep.  She reports that about twice a week she has significant difficulty shutting her mind down and experiences ruminating thoughts, which results in difficulty initiating sleep.  On these nights she takes zolpidem, which helps her quite significantly.  Once she falls asleep, she reports waking up every 2 hours, either because she is too warm or because she needs to go to the bathroom.  It can take her anywhere from 20 to 30 minutes to return to sleep when she gets back into bed.  She reports waking up spontaneously at  7:00 a.m.  She does not feel rested upon awakening and has some sleep inertia.  She drinks 2 cups of coffee in the morning.  She does not take any scheduled naps, but occasionally when she reads during the day she will fall asleep and can sleep for about 2 hours.  She avoids reading during the day.  She follows the same schedule most days of the week, including the weekends.  She considers herself a night owl.      The patient denies any features of motor restlessness suggestive of restless legs syndrome.  She does report lower back pain, which results in some difficulty with her sleep.  She denies any frequent dreams, nightmares or dream enactment behavior.  She does not have bruxism.      The patient occasionally wakes up with headaches in the morning.  She reports frequent neck soreness in the morning.  She reports snoring, which is loud in intensity.  She has snort arousals and witnessed apneas.  She is a mouth breather and frequently wakes up with a dry mouth.  She does have GERD, which is currently being treated.  She does not have any preferred body position to sleep in.      The patient denies any features of hypocretin deficiency including cataplexy, sleep paralysis or hypnagogic or hypnopompic hallucinations.      SOCIAL HISTORY:  The patient lives at home with her .  They are building a house and are currently living with their son and his family as well.  She denies smoking or any alcohol use.  The patient used to work in an office job as a payroll employee, but retired 6 years ago.  Her  was an OR tech for [employer] and retired about 6 months ago.  They used to live in [place], but recently moved to [place] to retire close to their family.      FAMILY HISTORY:  The patient reports that both her parents had hypertension.  Mother had snoring.  Father had ALS.      PAST SURGICAL HISTORY:  Hysterectomy and tonsillectomy as a child.      PAST MEDICAL HISTORY:  Hypertension,  hyperlipidemia, GERD, elevated hemoglobin A1c, obesity, osteoarthritis of lumbar spine with radiculopathy, vitamin D deficiency.      MEDICATIONS:     1.  Aspirin.     2.  Atorvastatin.     3.  Carvedilol.     4.  Losartan.   5.  Pantoprazole.    6.  Paroxetine.    7.  Tramadol.     8.  Acetaminophen.    9.  Zolpidem.      REVIEW OF SYSTEMS:  A complete 14-point review of systems was performed and found negative except as noted above.  The patient reports that her weight has been stable recently.      PHYSICAL EXAMINATION:   GENERAL:  BMI 34.1 kg/m2.   HEENT:  Mallampati class 1 airway.   Constitutional - looks well, in no apparent distress  Eyes - no redness or discharge  Respiratory -breathing appears comfortable.  No cough.  Skin - No appreciable discoloration or lesions (very limited exam)  Neurological - No apparent tremors. Speech fluent and articlate  Psychiatric - no signs of delirium or anxiety     Exam limited to that easily identified on a virtual visit. The rest of a comprehensive physical examination is deferred due to PHE (public health emergency) video visit restrictions.       ASSESSMENT AND PLAN:  The patient is a very pleasant 69-year-old female with multiple comorbidities who is being evaluated for difficulty initiating sleep and concern for sleep apnea.   1.  Sleep disordered breathing.  The patient has high pretest probability for obstructive sleep apnea.  We reviewed in detail the pathophysiology of LILLIAN as well as its various treatment options.  The patient particularly suggests that she would prefer to use a CPAP and not a mandibular advancement device if needed because of her past experience with the device.  She was advised to undergo an in-lab polysomnography.  She is not a candidate for home sleep study due to comorbid insomnia.  She will return to clinic after the PSG is completed to review the results and discuss treatment options.   2.  Sleep onset insomnia.  This is consistent with  psychophysiological insomnia.  We discussed improvement in sleep hygiene and importance of cognitive behavioral therapy for treatment of psychophysiological insomnia.  The patient was advised to avoid taking zolpidem as this can have side effects if taken long-term, particularly in elderly.  The patient is expected to improve once her sleep apnea is treated.  In case it does not improve, we will consider referral for cognitive behavioral therapy, which she agrees to.   3.  She was advised not to drive or operate heavy machinery if drowsy or sleepy.  She was counseled regarding the importance of weight loss.       I spent a total of 45 minutes with Amie Carcamo during today's virtual sleep clinic video visit. Over 50% of this time was spent counseling the patient and/or coordinating care regarding their sleep disorder.     Melchor James MD   of Medicine  Pulmonary, Critical Care and Sleep Medicine  [facility]  Pager: [phone]                    D: 2020   T: 2020   MT:       Name:     AMIE CARCAMO   MRN:      [id number]        Account:      [id number]   :      1950           Visit Date:   2020      Document: [id number]

## 2020-07-21 DIAGNOSIS — R29.818 SUSPECTED SLEEP APNEA: Primary | ICD-10-CM

## 2020-07-21 DIAGNOSIS — Z20.822 SUSPECTED COVID-19 VIRUS INFECTION: ICD-10-CM

## 2020-08-11 DIAGNOSIS — R29.818 SUSPECTED SLEEP APNEA: ICD-10-CM

## 2020-08-11 DIAGNOSIS — Z20.822 SUSPECTED COVID-19 VIRUS INFECTION: ICD-10-CM

## 2020-08-11 PROCEDURE — U0003 INFECTIOUS AGENT DETECTION BY NUCLEIC ACID (DNA OR RNA); SEVERE ACUTE RESPIRATORY SYNDROME CORONAVIRUS 2 (SARS-COV-2) (CORONAVIRUS DISEASE [COVID-19]), AMPLIFIED PROBE TECHNIQUE, MAKING USE OF HIGH THROUGHPUT TECHNOLOGIES AS DESCRIBED BY CMS-2020-01-R: HCPCS | Performed by: INTERNAL MEDICINE

## 2020-08-12 LAB
SARS-COV-2 RNA SPEC QL NAA+PROBE: NOT DETECTED
SPECIMEN SOURCE: NORMAL

## 2020-08-14 ENCOUNTER — THERAPY VISIT (OUTPATIENT)
Dept: SLEEP MEDICINE | Facility: CLINIC | Age: 70
End: 2020-08-14
Payer: MEDICARE

## 2020-08-14 DIAGNOSIS — R29.818 SUSPECTED SLEEP APNEA: ICD-10-CM

## 2020-08-14 PROCEDURE — 95810 POLYSOM 6/> YRS 4/> PARAM: CPT | Performed by: OTOLARYNGOLOGY

## 2020-08-21 LAB — SLPCOMP: NORMAL

## 2020-08-29 DIAGNOSIS — I10 ESSENTIAL HYPERTENSION: ICD-10-CM

## 2020-08-29 DIAGNOSIS — R23.2 HOT FLASHES: ICD-10-CM

## 2020-08-29 DIAGNOSIS — E78.2 MIXED HYPERLIPIDEMIA: ICD-10-CM

## 2020-08-29 DIAGNOSIS — K21.00 GASTROESOPHAGEAL REFLUX DISEASE WITH ESOPHAGITIS: ICD-10-CM

## 2020-09-01 VITALS — BODY MASS INDEX: 35.44 KG/M2 | HEIGHT: 63 IN | WEIGHT: 200 LBS

## 2020-09-01 RX ORDER — LOSARTAN POTASSIUM 50 MG/1
TABLET ORAL
Qty: 180 TABLET | Refills: 1 | Status: SHIPPED | OUTPATIENT
Start: 2020-09-01 | End: 2021-02-25

## 2020-09-01 RX ORDER — PANTOPRAZOLE SODIUM 40 MG/1
TABLET, DELAYED RELEASE ORAL
Qty: 90 TABLET | Refills: 1 | Status: SHIPPED | OUTPATIENT
Start: 2020-09-01 | End: 2021-02-25

## 2020-09-01 RX ORDER — PAROXETINE 10 MG/1
TABLET, FILM COATED ORAL
Qty: 90 TABLET | Refills: 1 | Status: SHIPPED | OUTPATIENT
Start: 2020-09-01 | End: 2021-02-25

## 2020-09-01 RX ORDER — CARVEDILOL 6.25 MG/1
TABLET ORAL
Qty: 180 TABLET | Refills: 1 | Status: SHIPPED | OUTPATIENT
Start: 2020-09-01 | End: 2021-02-25

## 2020-09-01 RX ORDER — ATORVASTATIN CALCIUM 20 MG/1
TABLET, FILM COATED ORAL
Qty: 90 TABLET | Refills: 1 | Status: SHIPPED | OUTPATIENT
Start: 2020-09-01 | End: 2021-02-25

## 2020-09-01 ASSESSMENT — MIFFLIN-ST. JEOR: SCORE: 1406.32

## 2020-09-01 NOTE — PROGRESS NOTES
"Shelly Carcamo is a 69 year old female who is being evaluated via a billable video visit.      The patient has been notified of following:     \"This video visit will be conducted via a call between you and your physician/provider. We have found that certain health care needs can be provided without the need for an in-person physical exam.  This service lets us provide the care you need with a video conversation.  If a prescription is necessary we can send it directly to your pharmacy.  If lab work is needed we can place an order for that and you can then stop by our lab to have the test done at a later time.    Video visits are billed at different rates depending on your insurance coverage.  Please reach out to your insurance provider with any questions.    If during the course of the call the physician/provider feels a video visit is not appropriate, you will not be charged for this service.\"    Patient has given verbal consent for Video visit? Yes  How would you like to obtain your AVS? MyChart  If you are dropped from the video visit, the video invite should be resent to: Send to e-mail at: [email or web address]  Will anyone else be joining your video visit? No        Video-Visit Details    Type of service:  Video Visit    Video Start Time: 9:05  Video End Time: 9:21     Originating Location (pt. Location): Home    Distant Location (provider location):  [facility]     Platform used for Video Visit: Osmany James MD        "

## 2020-09-01 NOTE — PATIENT INSTRUCTIONS
Your BMI is Body mass index is 35.43 kg/m .  Weight management is a personal decision.  If you are interested in exploring weight loss strategies, the following discussion covers the approaches that may be successful. Body mass index (BMI) is one way to tell whether you are at a healthy weight, overweight, or obese. It measures your weight in relation to your height.  A BMI of 18.5 to 24.9 is in the healthy range. A person with a BMI of 25 to 29.9 is considered overweight, and someone with a BMI of 30 or greater is considered obese. More than two-thirds of American adults are considered overweight or obese.  Being overweight or obese increases the risk for further weight gain. Excess weight may lead to heart disease and diabetes.  Creating and following plans for healthy eating and physical activity may help you improve your health.  Weight control is part of healthy lifestyle and includes exercise, emotional health, and healthy eating habits. Careful eating habits lifelong are the mainstay of weight control. Though there are significant health benefits from weight loss, long-term weight loss with diet alone may be very difficult to achieve- studies show long-term success with dietary management in less than 10% of people. Attaining a healthy weight may be especially difficult to achieve in those with severe obesity. In some cases, medications, devices and surgical management might be considered.  What can you do?  If you are overweight or obese and are interested in methods for weight loss, you should discuss this with your provider.     Consider reducing daily calorie intake by 500 calories.     Keep a food journal.     Avoiding skipping meals, consider cutting portions instead.    Diet combined with exercise helps maintain muscle while optimizing fat loss. Strength training is particularly important for building and maintaining muscle mass. Exercise helps reduce stress, increase energy, and improves fitness.  Increasing exercise without diet control, however, may not burn enough calories to loose weight.       Start walking three days a week 10-20 minutes at a time    Work towards walking thirty minutes five days a week     Eventually, increase the speed of your walking for 1-2 minutes at time    In addition, we recommend that you review healthy lifestyles and methods for weight loss available through the National Institutes of Health patient information sites:  http://win.niddk.nih.gov/publications/index.htm    And look into health and wellness programs that may be available through your health insurance provider, employer, local community center, or nikia club.    Weight management plan: Patient was referred to their PCP to discuss a diet and exercise plan.    Drive Safe... Drive Alive     Sleep health profoundly affects your health, mood, and your safety. 33% of the population (one in three of us) is not getting enough sleep and many have a sleep disorder. Not getting enough sleep or having an untreated / undertreated sleep condition may make us sleepy without even knowing it. In fact, our driving could be dramatically impaired due to our sleep health. As your provider, here are some things I would like you to know about driving:     Here are some warning signs for impairment and dangerous drowsy driving:              -Having been awake more than 16 hours               -Looking tired               -Eyelid drooping              -Head nodding (it could be too late at this point)              -Driving for more than 30 minutes     Some things you could do to make the driving safer if you are experiencing some drowsiness:              -Stop driving and rest              -Call for transportation              -Make sure your sleep disorder is adequately treated     Some things that have been shown NOT to work when experiencing drowsiness while driving:              -Turning on the radio              -Opening windows               -Eating any  distracting  /  entertaining  foods (e.g., sunflower seeds, candy, or any other)              -Talking on the phone      Your decision may not only impact your life, but also the life of others. Please, remember to drive safe for yourself and all of us.    Melchor James MD

## 2020-09-02 ENCOUNTER — VIRTUAL VISIT (OUTPATIENT)
Dept: SLEEP MEDICINE | Facility: CLINIC | Age: 70
End: 2020-09-02
Payer: MEDICARE

## 2020-09-02 DIAGNOSIS — G47.61 PLMD (PERIODIC LIMB MOVEMENT DISORDER): Primary | ICD-10-CM

## 2020-09-02 PROCEDURE — 99213 OFFICE O/P EST LOW 20 MIN: CPT | Mod: 95 | Performed by: INTERNAL MEDICINE

## 2020-09-02 RX ORDER — GABAPENTIN 100 MG/1
CAPSULE ORAL
Qty: 90 CAPSULE | Refills: 1 | Status: SHIPPED | OUTPATIENT
Start: 2020-09-02 | End: 2020-11-11

## 2020-09-02 NOTE — PROGRESS NOTES
SLEEP MEDICINE VIRTUAL CLINIC NOTE   [facility]  Shelly Carcamo 69 year old female  : 1950  MRN: [id number]      PRIMARY CARE PROVIDER: Bridget Holder    Visit Date:   2020      REASON FOR VISIT:  Review of recent overnight polysomnography and evaluation of difficulty initiating sleep.      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  She was last seen in our clinic approximately 2 months ago and was suggested to undergo a PSG, which was completed on 2020.      The patient denies any significant changes to her sleep or health since she was last seen in our clinic.  She continues to go to bed between 9:30 and 10:00 p.m.  She reads for about 30 minutes, she wakes up every 3-4 hours through the night, during which she stays in bed, thinks about various things and then returns to sleep in 15-20 minutes.  She wakes up spontaneously between 6:30 and 7:00 a.m.      ASSESSMENT AND PLAN:  The patient's polysomnography performed on 2020 was reviewed with her.  The study did not show any sleep apnea.  Her AHI was 3, respiratory disturbance index was 10.7.  Sergey oxygen saturation was 85% with less than 1 minute spent below 88%.  Her arousal index was increased at 51.9 events per hour.  Limb movement index was 46.5 with PLM index of 44.6 with PLM-associated arousals being 24.4 events per hour.  Her sleep efficiency was 79% with total sleep time of 375 minutes.  All stages of sleep were noted.  Mild snoring was noted intermittently throughout the night.  No cardiac or movement abnormalities were noted.      Based on the PSG, the patient appears to have excessive periodic limb movements, but  no obstructive sleep apnea.  She has significant arousals associated with periodic limb movements, it is possible that her insomnia symptoms and daytime fatigue could be explained by the periodic limb movement disorder.  We discussed in detail the pathophysiology of this and recommended a trial of gabapentin starting at 100 mg about 2 hours prior to bedtime and increasing up to 300 mg in 1 week.  This would be a 1-month trial and if no significant improvement is noted in her sleep continuity or daytime functioning, we may discontinue gabapentin.      Sleep maintenance difficulty is likely that it is related to periodic limb movement disorder.  Also, we discussed techniques of stimulus control and improving her sleep hygiene.      The patient will return to clinic in approximately 6 weeks.  She was advised to continue working on weight loss and avoid driving if drowsy or sleepy.         I spent a total of 16 minutes with Amie Carcamo during today's virtual sleep clinic virtual visit. Additional 10 minutes were spent in preparation for consult and care coordination.     Melchor James MD   of Medicine  Pulmonary, Critical Care and Sleep Medicine  [facility]  Pager: [phone]                    D: 2020   T: 2020   MT: NTS      Name:     AMIE CARCAMO   MRN:      [id number]        Account:      [id number]   :      1950           Visit Date:   2020      Document: [id number]

## 2020-09-14 ENCOUNTER — TELEPHONE (OUTPATIENT)
Dept: PULMONOLOGY | Facility: CLINIC | Age: 70
End: 2020-09-14

## 2020-09-14 NOTE — TELEPHONE ENCOUNTER
Pt called and message left for pt to call back to schedule Medication follow up.    CROW Jordan

## 2020-09-24 DIAGNOSIS — M47.26 OSTEOARTHRITIS OF SPINE WITH RADICULOPATHY, LUMBAR REGION: ICD-10-CM

## 2020-09-24 NOTE — TELEPHONE ENCOUNTER
Pending Prescriptions:                       Disp   Refills    traMADol-acetaminophen (ULTRACET) 37.5-325*30 tab*0        Sig: TAKE ONE TABLET BY MOUTH EVERY 4 HOURS      Routing refill request to provider for review/approval because:  Drug not on the [facility] refill protocol     Elvira Turpin RN

## 2020-10-13 ENCOUNTER — IMMUNIZATION (OUTPATIENT)
Dept: FAMILY MEDICINE | Facility: OTHER | Age: 70
End: 2020-10-13
Payer: MEDICARE

## 2020-10-13 DIAGNOSIS — Z23 NEED FOR PROPHYLACTIC VACCINATION AND INOCULATION AGAINST INFLUENZA: Primary | ICD-10-CM

## 2020-10-13 PROCEDURE — 99207 PR NO CHARGE NURSE ONLY: CPT

## 2020-10-13 PROCEDURE — 90662 IIV NO PRSV INCREASED AG IM: CPT

## 2020-10-13 PROCEDURE — G0008 ADMIN INFLUENZA VIRUS VAC: HCPCS

## 2020-10-13 NOTE — PROGRESS NOTES
Patient received influenza vaccination today. See immunization tab for complete administration details.     Saadia Saxena MA

## 2020-10-26 ENCOUNTER — ALLIED HEALTH/NURSE VISIT (OUTPATIENT)
Dept: FAMILY MEDICINE | Facility: OTHER | Age: 70
End: 2020-10-26
Payer: MEDICARE

## 2020-10-26 DIAGNOSIS — Z23 NEED FOR VACCINATION: Primary | ICD-10-CM

## 2020-10-26 PROCEDURE — 90732 PPSV23 VACC 2 YRS+ SUBQ/IM: CPT

## 2020-10-26 PROCEDURE — G0009 ADMIN PNEUMOCOCCAL VACCINE: HCPCS

## 2020-10-26 PROCEDURE — 99207 PR NO CHARGE NURSE ONLY: CPT

## 2020-11-10 NOTE — PROGRESS NOTES
"Shelly Carcamo is a 70 year old female who is being evaluated via a billable video visit.      The patient has been notified of following:     \"This video visit will be conducted via a call between you and your physician/provider. We have found that certain health care needs can be provided without the need for an in-person physical exam.  This service lets us provide the care you need with a video conversation.  If a prescription is necessary we can send it directly to your pharmacy.  If lab work is needed we can place an order for that and you can then stop by our lab to have the test done at a later time.    Video visits are billed at different rates depending on your insurance coverage.  Please reach out to your insurance provider with any questions.    If during the course of the call the physician/provider feels a video visit is not appropriate, you will not be charged for this service.\"    Patient has given verbal consent for Video visit? Yes  How would you like to obtain your AVS? MyChart  If you are dropped from the video visit, the video invite should be resent to: Send to e-mail at: [email or web address]  Will anyone else be joining your video visit? No        Video-Visit Details    Type of service:  Video Visit    Video Start Time: 11:03 AM  Video End Time: 11:16 AM    Originating Location (pt. Location): Home    Distant Location (provider location):  [facility]     Platform used for Video Visit: Osmany James MD        "

## 2020-11-10 NOTE — PATIENT INSTRUCTIONS
Your BMI is There is no height or weight on file to calculate BMI.  Weight management is a personal decision.  If you are interested in exploring weight loss strategies, the following discussion covers the approaches that may be successful. Body mass index (BMI) is one way to tell whether you are at a healthy weight, overweight, or obese. It measures your weight in relation to your height.  A BMI of 18.5 to 24.9 is in the healthy range. A person with a BMI of 25 to 29.9 is considered overweight, and someone with a BMI of 30 or greater is considered obese. More than two-thirds of American adults are considered overweight or obese.  Being overweight or obese increases the risk for further weight gain. Excess weight may lead to heart disease and diabetes.  Creating and following plans for healthy eating and physical activity may help you improve your health.  Weight control is part of healthy lifestyle and includes exercise, emotional health, and healthy eating habits. Careful eating habits lifelong are the mainstay of weight control. Though there are significant health benefits from weight loss, long-term weight loss with diet alone may be very difficult to achieve- studies show long-term success with dietary management in less than 10% of people. Attaining a healthy weight may be especially difficult to achieve in those with severe obesity. In some cases, medications, devices and surgical management might be considered.  What can you do?  If you are overweight or obese and are interested in methods for weight loss, you should discuss this with your provider.     Consider reducing daily calorie intake by 500 calories.     Keep a food journal.     Avoiding skipping meals, consider cutting portions instead.    Diet combined with exercise helps maintain muscle while optimizing fat loss. Strength training is particularly important for building and maintaining muscle mass. Exercise helps reduce stress, increase energy,  and improves fitness. Increasing exercise without diet control, however, may not burn enough calories to loose weight.       Start walking three days a week 10-20 minutes at a time    Work towards walking thirty minutes five days a week     Eventually, increase the speed of your walking for 1-2 minutes at time    In addition, we recommend that you review healthy lifestyles and methods for weight loss available through the National Institutes of Health patient information sites:  http://win.niddk.nih.gov/publications/index.htm    And look into health and wellness programs that may be available through your health insurance provider, employer, local community center, or nikia club.    Weight management plan: Patient was referred to their PCP to discuss a diet and exercise plan.    Drive Safe... Drive Alive     Sleep health profoundly affects your health, mood, and your safety. 33% of the population (one in three of us) is not getting enough sleep and many have a sleep disorder. Not getting enough sleep or having an untreated / undertreated sleep condition may make us sleepy without even knowing it. In fact, our driving could be dramatically impaired due to our sleep health. As your provider, here are some things I would like you to know about driving:     Here are some warning signs for impairment and dangerous drowsy driving:              -Having been awake more than 16 hours               -Looking tired               -Eyelid drooping              -Head nodding (it could be too late at this point)              -Driving for more than 30 minutes     Some things you could do to make the driving safer if you are experiencing some drowsiness:              -Stop driving and rest              -Call for transportation              -Make sure your sleep disorder is adequately treated     Some things that have been shown NOT to work when experiencing drowsiness while driving:              -Turning on the radio               -Opening windows              -Eating any  distracting  /  entertaining  foods (e.g., sunflower seeds, candy, or any other)              -Talking on the phone      Your decision may not only impact your life, but also the life of others. Please, remember to drive safe for yourself and all of us.    Melchor James MD

## 2020-11-11 ENCOUNTER — VIRTUAL VISIT (OUTPATIENT)
Dept: SLEEP MEDICINE | Facility: CLINIC | Age: 70
End: 2020-11-11
Payer: MEDICARE

## 2020-11-11 DIAGNOSIS — M47.26 OSTEOARTHRITIS OF SPINE WITH RADICULOPATHY, LUMBAR REGION: ICD-10-CM

## 2020-11-11 DIAGNOSIS — G47.61 PLMD (PERIODIC LIMB MOVEMENT DISORDER): Primary | ICD-10-CM

## 2020-11-11 PROCEDURE — 99212 OFFICE O/P EST SF 10 MIN: CPT | Mod: 95 | Performed by: INTERNAL MEDICINE

## 2020-11-11 RX ORDER — GABAPENTIN 300 MG/1
CAPSULE ORAL
Qty: 30 CAPSULE | Refills: 11 | Status: SHIPPED | OUTPATIENT
Start: 2020-11-11 | End: 2021-01-26

## 2020-11-11 NOTE — PROGRESS NOTES
SLEEP MEDICINE VIRTUAL CLINIC NOTE   [facility]  Shelly Carcamo 70 year old female  : 1950  MRN: [id number]      PRIMARY CARE PROVIDER: Bridget Holder    Visit Date:   2020      REASON FOR VIRTUAL VISIT:  Followup of periodic limb movement disorder.      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  She was last seen in our clinic approximately 2 months ago.  Prior to that, she had undergone an overnight polysomnography, which did not show any significant sleep-related breathing disorders.  Her Apnea-Hypopnea index was 3 events per hour with a respiratory disturbance index of 10.7 events per hour.  The patient was noted to have significant increase in cortical arousals with an arousal index of 51.9.  Her PLM index was 44.6 and PLM-associated arousals were 24.4 events per hour.  Mild snoring was noted intermittently through the night.      Given the patient's difficulty initiating and maintaining sleep, it was recommended that we consider treatment of periodic limb movement disorder.  The patient was suggested to start 100 mg of gabapentin and increase it up to 300 mg 2 hours prior to bedtime.      She reports that she has been taking gabapentin since her last visit and has noted a significant decrease in leg twitching, which appears to be helping her fall asleep.  She continues to experience hot flashes which are the primary reason for her inability to maintain sleep.  She also reports that she recently moved to a new house and it has been quite stressful with the move.  She reports going to bed around 10:00 p.m.  It takes her just a few minutes to fall asleep.  She reports waking up between 6:00 and 7:00 a.m. spontaneously.  She does have 2 middle-of-the-night awakenings, which can last for about 20  minutes or so.  She reports ruminating thoughts as the primary reason for her inability to return to sleep right away.  Upon awakening in the morning, she notices some non-refreshing sleep.  She does occasionally take 5 mg of zolpidem for sleep initiation.      Besides this, no significant changes have occurred to her sleep or health since she was last seen in our clinic.      ASSESSMENT AND PLAN:  Periodic limb movement disorder.  The patient's symptoms are consistent with PLMD.  We will continue gabapentin as she appears to be deriving benefit from it.  I would increase the dose of gabapentin to 300 mg to be taken 2 hours prior to bedtime.  The patient is tolerating it very well.  I will see her back in clinic in approximately 6 months and if she continues to have sleep maintenance difficulties, I may consider a referral for cognitive behavioral therapy for insomnia.      The patient was suggested not to drive or operate heavy machinery if drowsy or sleepy.  She was counseled regarding the importance of weight loss.         I spent a total of 13 minutes with Amie Carcamo during today's virtual sleep clinic virtual visit. Additional 10 minutes were spent in preparation for consult and care coordination.     Melchor James MD   of Medicine  Pulmonary, Critical Care and Sleep Medicine  [facility]                      D: 2020   T: 2020   MT: ZANDER      Name:     AMIE CARCAMO   MRN:      [id number]        Account:      [id number]   :      1950           Visit Date:   2020      Document: [id number]

## 2020-11-11 NOTE — TELEPHONE ENCOUNTER
Pending Prescriptions:                       Disp   Refills    traMADol-acetaminophen (ULTRACET) 37.5-325*30 tab*0        Sig: TAKE ONE TABLET BY MOUTH EVERY 4 HOURS        Routing refill request to provider for review/approval because:  Drug not on the [facility] refill protocol   Last Seen: 02/10/20  Last Refilled: 09/25/20 30Tablets/Capsules  Refills: 0  Next Visit: SANA Montilla RN  November 11, 2020

## 2020-11-24 ENCOUNTER — TELEPHONE (OUTPATIENT)
Dept: FAMILY MEDICINE | Facility: OTHER | Age: 70
End: 2020-11-24

## 2020-11-24 NOTE — TELEPHONE ENCOUNTER
Summary:    Patient is due/failing the following:   Hemoglobin and A1C    Action needed:   Patient needs fasting lab only appointment    Type of outreach:    Phone, left message for patient to call back.     Questions for provider review:    None                                                                                                                                    Sirena Dubon CMA       Chart routed to Care Team .

## 2020-12-28 DIAGNOSIS — M47.26 OSTEOARTHRITIS OF SPINE WITH RADICULOPATHY, LUMBAR REGION: ICD-10-CM

## 2021-01-05 NOTE — PROGRESS NOTES
"SUBJECTIVE:   Shelly Carcamo is a 70 year old female who presents for Preventive Visit.      Patient has been advised of split billing requirements and indicates understanding: Yes   Are you in the first 12 months of your Medicare coverage?  No    Healthy Habits:     In general, how would you rate your overall health?  Good    Frequency of exercise:  1 day/week    Duration of exercise:  Less than 15 minutes    Do you usually eat at least 4 servings of fruit and vegetables a day, include whole grains    & fiber and avoid regularly eating high fat or \"junk\" foods?  No    Taking medications regularly:  Yes    Medication side effects:  None    Ability to successfully perform activities of daily living:  No assistance needed    Home Safety:  No safety concerns identified    Hearing Impairment:  Difficulty following a conversation in a noisy restaurant or crowded room and difficulty understanding soft or whispered speech    In the past 6 months, have you been bothered by leaking of urine?  No    In general, how would you rate your overall mental or emotional health?  Good      PHQ-2 Total Score: 0    Additional concerns today:  Yes    Do you feel safe in your environment? Yes    Have you ever done Advance Care Planning? (For example, a Health Directive, POLST, or a discussion with a medical provider or your loved ones about your wishes): No, advance care planning information given to patient to review.  Advanced care planning was discussed at today's visit.      Fall risk  Fallen 2 or more times in the past year?: (P) No  Any fall with injury in the past year?: (P) No  click delete button to remove this line now  Cognitive Screening   1) Repeat 3 items (Leader, Season, Table)    2) Clock draw: NORMAL  3) 3 item recall: Recalls 3 objects  Results: 3 items recalled: COGNITIVE IMPAIRMENT LESS LIKELY    Mini-CogTM Copyright S Justin. Licensed by the author for use in [facility]; reprinted with permission " (judd@[facility]). All rights reserved.      Do you have sleep apnea, excessive snoring or daytime drowsiness?: just snoring   ---   has restless leg    Reviewed and updated as needed this visit by clinical staff                 Reviewed and updated as needed this visit by Provider                Social History     Tobacco Use     Smoking status: Never Smoker     Smokeless tobacco: Never Used   Substance Use Topics     Alcohol use: Yes     Comment: socially on occasion         Alcohol Use 1/29/2020   Prescreen: >3 drinks/day or >7 drinks/week? No               Current providers sharing in care for this patient include:   Patient Care Team:  Bridget Holder APRN CNP as PCP - General (Nurse Practitioner - Family)  Bridget Holder APRN CNP as Assigned PCP  Melchor James MD as Assigned Pulmonology Provider    The following health maintenance items are reviewed in Epic and correct as of today:  Health Maintenance   Topic Date Due     HEPATITIS C SCREENING  10/16/1969     ZOSTER IMMUNIZATION (1 of 2) 10/16/2001     PHQ-2  01/01/2021     MEDICARE ANNUAL WELLNESS VISIT  01/29/2021     FALL RISK ASSESSMENT  01/29/2021     MAMMO SCREENING  05/12/2022     ADVANCE CARE PLANNING  01/29/2025     LIPID  07/08/2025     COLORECTAL CANCER SCREENING  02/10/2027     DTAP/TDAP/TD IMMUNIZATION (2 - Td) 04/30/2029     DEXA  11/11/2034     INFLUENZA VACCINE  Completed     Pneumococcal Vaccine: 65+ Years  Completed     Pneumococcal Vaccine: Pediatrics (0 to 5 Years) and At-Risk Patients (6 to 64 Years)  Aged Out     IPV IMMUNIZATION  Aged Out     MENINGITIS IMMUNIZATION  Aged Out     HEPATITIS B IMMUNIZATION  Aged Out     Labs reviewed in EPIC  BP Readings from Last 3 Encounters:   01/08/21 124/70   02/10/20 126/80   01/29/20 124/80    Wt Readings from Last 3 Encounters:   01/08/21 100.2 kg (221 lb)   09/01/20 90.7 kg (200 lb)   02/10/20 99.3 kg (219 lb)                  Patient Active Problem List   Diagnosis     High  cholesterol     HTN (hypertension)     Vitamin B12 deficiency     Obesity (BMI 35.0-39.9) with comorbidity (H)     Vitamin D deficiency     DDD (degenerative disc disease), lumbar     Osteoarthritis of spine with radiculopathy, lumbar region     Psychophysiological insomnia     Gastroesophageal reflux disease with esophagitis     Controlled substance agreement signed     Elevated hemoglobin A1c     Past Surgical History:   Procedure Laterality Date     CATARACT IOL, RT/LT Bilateral      CATARACT IOL, RT/LT Bilateral 2015     HYSTERECTOMY       HYSTERECTOMY, PAP NO LONGER INDICATED         Social History     Tobacco Use     Smoking status: Never Smoker     Smokeless tobacco: Never Used   Substance Use Topics     Alcohol use: Yes     Comment: socially on occasion     Family History   Problem Relation Age of Onset     Pancreatic Cancer Mother      ALS Father      Atrial fibrillation Brother          Current Outpatient Medications   Medication Sig Dispense Refill     aspirin 81 MG EC tablet Take 81 mg by mouth daily       atorvastatin (LIPITOR) 20 MG tablet TAKE ONE TABLET BY MOUTH ONCE DAILY 90 tablet 1     carvedilol (COREG) 6.25 MG tablet TAKE ONE TABLET BY MOUTH TWICE A DAY WITH MEALS 180 tablet 1     gabapentin (NEURONTIN) 300 MG capsule Take 1 capsule 2 hours before bedtime. Increase to 3 capsules 2 hours before bedtime in 1 week. 30 capsule 11     losartan (COZAAR) 50 MG tablet TAKE TWO TABLETS ( 100 MG) BY MOUTH ONCE DAILY 180 tablet 1     pantoprazole (PROTONIX) 40 MG EC tablet TAKE ONE TABLET BY MOUTH ONCE DAILY 90 tablet 1     PARoxetine (PAXIL) 10 MG tablet TAKE ONE TABLET BY MOUTH EVERY MORNING 90 tablet 1     traMADol-acetaminophen (ULTRACET) 37.5-325 MG tablet TAKE ONE TABLET BY MOUTH EVERY 4 HOURS 30 tablet 0     zolpidem (AMBIEN) 5 MG tablet Take 1 tablet (5 mg) by mouth nightly as needed for sleep 30 tablet 3     No Known Allergies  Recent Labs   Lab Test 01/06/21  0749 07/08/20  0730 11/20/19 07/09/19  "03/06/19   A1C 5.9* 5.8* 5.9* 6.0* 5.9*   LDL  --  65 66 66 67   HDL  --  89 81 79 85   TRIG  --  106 124 93 186*   ALT  --  27 18 15 23   CR 1.06* 1.22* 1.03* 1.01* 0.93   GFRESTIMATED 53* 45* 56* 58* 64   GFRESTBLACK 62 52* 65 67 74   POTASSIUM 4.3 4.3 4.8 4.8 4.7   TSH  --   --   --  1.32 2.06      Mammogram Screening: Mammogram Screening: Patient over age 50, mutual decision to screen reflected in health maintenance.  Last 3 Pap and HPV Results:      Review of Systems   Constitutional: Negative for chills and fever.   HENT: Negative for congestion, ear pain, hearing loss and sore throat.    Eyes: Positive for visual disturbance. Negative for pain.   Respiratory: Negative for cough and shortness of breath.    Cardiovascular: Negative for chest pain, palpitations and peripheral edema.   Gastrointestinal: Negative for abdominal pain, constipation, diarrhea, heartburn, hematochezia and nausea.   Breasts:  Negative for tenderness, breast mass and discharge.   Genitourinary: Negative for dysuria, frequency, genital sores, hematuria, pelvic pain, vaginal bleeding and vaginal discharge.   Musculoskeletal: Positive for arthralgias. Negative for joint swelling and myalgias.   Skin: Negative for rash.   Neurological: Negative for dizziness, weakness, headaches and paresthesias.   Psychiatric/Behavioral: Negative for mood changes. The patient is not nervous/anxious.      Constitutional, HEENT, cardiovascular, pulmonary, GI, , musculoskeletal, neuro, skin, endocrine and psych systems are negative, except as otherwise noted.    OBJECTIVE:   There were no vitals taken for this visit. Estimated body mass index is 35.43 kg/m  as calculated from the following:    Height as of 9/1/20: 1.6 m (5' 3\").    Weight as of 9/1/20: 90.7 kg (200 lb).  Physical Exam  GENERAL: healthy, alert and no distress  EYES: Eyes grossly normal to inspection, PERRL and conjunctivae and sclerae normal  HENT: ear canals and TM's normal, nose and mouth " without ulcers or lesions  NECK: no adenopathy, no asymmetry, masses, or scars and thyroid normal to palpation  RESP: lungs clear to auscultation - no rales, rhonchi or wheezes  BREAST: normal without masses, tenderness or nipple discharge and no palpable axillary masses or adenopathy  CV: regular rate and rhythm, normal S1 S2, no S3 or S4, no murmur, click or rub, no peripheral edema and peripheral pulses strong  ABDOMEN: soft, nontender, no hepatosplenomegaly, no masses and bowel sounds normal  MS: no gross musculoskeletal defects noted, no edema  SKIN: no suspicious lesions or rashes  NEURO: Normal strength and tone, mentation intact and speech normal  PSYCH: mentation appears normal, affect normal/bright    Diagnostic Test Results:  Labs reviewed in Epic    ASSESSMENT / PLAN:   1. Encounter for Medicare annual wellness exam  Reviewed recommended screenings and ordered appropriate testing for pt's risks and per pt's request(s).     - SHINGRIX [[id number]]  - MA Screen Bilateral w/Ruslan; Future    2. Need for hepatitis C screening test   - Hepatitis C Screen Reflex to HCV RNA Quant and Genotype; Future    3. Encounter for screening mammogram for breast cancer    - MA Screen Bilateral w/Ruslan; Future    4. Need for vaccination    - SHINGRIX [[id number]]    5. Essential hypertension    - EYE ADULT REFERRAL; Future    6. Bilateral sensorineural hearing loss    - AUDIOLOGY ADULT REFERRAL; Future    7. Morbid obesity (H)        Patient has been advised of split billing requirements and indicates understanding: Yes  COUNSELING:  Reviewed preventive health counseling, as reflected in patient instructions       Regular exercise       Healthy diet/nutrition       Vision screening       Hearing screening       Fall risk prevention       Immunizations    Vaccinated for: Zoster             Osteoporosis Prevention/Bone Health    Estimated body mass index is 35.43 kg/m  as calculated from the following:    Height as of 9/1/20: 1.6 m  "(5' 3\").    Weight as of 9/1/20: 90.7 kg (200 lb).    Weight management plan: Discussed healthy diet and exercise guidelines    She reports that she has never smoked. She has never used smokeless tobacco.      Appropriate preventive services were discussed with this patient, including applicable screening as appropriate for cardiovascular disease, diabetes, osteopenia/osteoporosis, and glaucoma.  As appropriate for age/gender, discussed screening for colorectal cancer, prostate cancer, breast cancer, and cervical cancer. Checklist reviewing preventive services available has been given to the patient.    Reviewed patients plan of care and provided an AVS. The Basic Care Plan (routine screening as documented in Health Maintenance) for Shelly meets the Care Plan requirement. This Care Plan has been established and reviewed with the Patient.    Counseling Resources:  ATP IV Guidelines  Pooled Cohorts Equation Calculator  Breast Cancer Risk Calculator  Breast Cancer: Medication to Reduce Risk  FRAX Risk Assessment  ICSI Preventive Guidelines  Dietary Guidelines for Americans, 2010  USDA's MyPlate  ASA Prophylaxis  Lung CA Screening    [facility]    Identified Health Risks:  "

## 2021-01-05 NOTE — PATIENT INSTRUCTIONS
Patient Education   Personalized Prevention Plan  You are due for the preventive services outlined below.  Your care team is available to assist you in scheduling these services.  If you have already completed any of these items, please share that information with your care team to update in your medical record.  Health Maintenance Due   Topic Date Due     Hepatitis C Screening  10[birth year]16/1969     Zoster (Shingles) Vaccine (1 of 2) 10/16/2001     PHQ-2  01/01/2021     Annual Wellness Visit  01/29/2021     FALL RISK ASSESSMENT  01/29/2021

## 2021-01-06 DIAGNOSIS — R73.03 PREDIABETES: ICD-10-CM

## 2021-01-06 LAB
ANION GAP SERPL CALCULATED.3IONS-SCNC: 5 MMOL/L (ref 3–14)
BUN SERPL-MCNC: 26 MG/DL (ref 7–30)
CALCIUM SERPL-MCNC: 8.9 MG/DL (ref 8.5–10.1)
CHLORIDE SERPL-SCNC: 104 MMOL/L (ref 94–109)
CO2 SERPL-SCNC: 29 MMOL/L (ref 20–32)
CREAT SERPL-MCNC: 1.06 MG/DL (ref 0.52–1.04)
GFR SERPL CREATININE-BSD FRML MDRD: 53 ML/MIN/{1.73_M2}
GLUCOSE SERPL-MCNC: 106 MG/DL (ref 70–99)
HBA1C MFR BLD: 5.9 % (ref 0–5.6)
POTASSIUM SERPL-SCNC: 4.3 MMOL/L (ref 3.4–5.3)
SODIUM SERPL-SCNC: 138 MMOL/L (ref 133–144)

## 2021-01-06 PROCEDURE — 83036 HEMOGLOBIN GLYCOSYLATED A1C: CPT | Performed by: NURSE PRACTITIONER

## 2021-01-06 PROCEDURE — 36415 COLL VENOUS BLD VENIPUNCTURE: CPT | Performed by: NURSE PRACTITIONER

## 2021-01-06 PROCEDURE — 80048 BASIC METABOLIC PNL TOTAL CA: CPT | Performed by: NURSE PRACTITIONER

## 2021-01-06 ASSESSMENT — ENCOUNTER SYMPTOMS
HEMATOCHEZIA: 0
CONSTIPATION: 0
DIZZINESS: 0
NERVOUS/ANXIOUS: 0
SHORTNESS OF BREATH: 0
HEADACHES: 0
JOINT SWELLING: 0
FEVER: 0
DIARRHEA: 0
ABDOMINAL PAIN: 0
NAUSEA: 0
FREQUENCY: 0
BREAST MASS: 0
WEAKNESS: 0
DYSURIA: 0
ARTHRALGIAS: 1
COUGH: 0
PALPITATIONS: 0
EYE PAIN: 0
MYALGIAS: 0
SORE THROAT: 0
HEARTBURN: 0
PARESTHESIAS: 0
CHILLS: 0
HEMATURIA: 0

## 2021-01-06 ASSESSMENT — ACTIVITIES OF DAILY LIVING (ADL): CURRENT_FUNCTION: NO ASSISTANCE NEEDED

## 2021-01-08 ENCOUNTER — OFFICE VISIT (OUTPATIENT)
Dept: FAMILY MEDICINE | Facility: CLINIC | Age: 71
End: 2021-01-08
Payer: MEDICARE

## 2021-01-08 VITALS
TEMPERATURE: 97.8 F | OXYGEN SATURATION: 97 % | RESPIRATION RATE: 16 BRPM | DIASTOLIC BLOOD PRESSURE: 70 MMHG | SYSTOLIC BLOOD PRESSURE: 124 MMHG | HEIGHT: 64 IN | WEIGHT: 221 LBS | BODY MASS INDEX: 37.73 KG/M2 | HEART RATE: 73 BPM

## 2021-01-08 DIAGNOSIS — H90.3 BILATERAL SENSORINEURAL HEARING LOSS: ICD-10-CM

## 2021-01-08 DIAGNOSIS — Z23 NEED FOR VACCINATION: ICD-10-CM

## 2021-01-08 DIAGNOSIS — E66.01 MORBID OBESITY (H): ICD-10-CM

## 2021-01-08 DIAGNOSIS — Z00.00 ENCOUNTER FOR MEDICARE ANNUAL WELLNESS EXAM: Primary | ICD-10-CM

## 2021-01-08 DIAGNOSIS — Z11.59 NEED FOR HEPATITIS C SCREENING TEST: ICD-10-CM

## 2021-01-08 DIAGNOSIS — Z12.31 ENCOUNTER FOR SCREENING MAMMOGRAM FOR BREAST CANCER: ICD-10-CM

## 2021-01-08 DIAGNOSIS — I10 ESSENTIAL HYPERTENSION: ICD-10-CM

## 2021-01-08 PROCEDURE — G0439 PPPS, SUBSEQ VISIT: HCPCS | Performed by: NURSE PRACTITIONER

## 2021-01-08 PROCEDURE — 99213 OFFICE O/P EST LOW 20 MIN: CPT | Mod: 25 | Performed by: NURSE PRACTITIONER

## 2021-01-08 PROCEDURE — 90471 IMMUNIZATION ADMIN: CPT | Performed by: NURSE PRACTITIONER

## 2021-01-08 PROCEDURE — 90750 HZV VACC RECOMBINANT IM: CPT | Performed by: NURSE PRACTITIONER

## 2021-01-08 ASSESSMENT — ENCOUNTER SYMPTOMS
CONSTIPATION: 0
DIZZINESS: 0
SHORTNESS OF BREATH: 0
BREAST MASS: 0
SORE THROAT: 0
HEMATOCHEZIA: 0
DYSURIA: 0
HEARTBURN: 0
FREQUENCY: 0
WEAKNESS: 0
COUGH: 0
NERVOUS/ANXIOUS: 0
FEVER: 0
CHILLS: 0
EYE PAIN: 0
MYALGIAS: 0
HEMATURIA: 0
PALPITATIONS: 0
NAUSEA: 0
ABDOMINAL PAIN: 0
HEADACHES: 0
DIARRHEA: 0
PARESTHESIAS: 0
JOINT SWELLING: 0
ARTHRALGIAS: 1

## 2021-01-08 ASSESSMENT — PAIN SCALES - GENERAL: PAINLEVEL: NO PAIN (0)

## 2021-01-08 ASSESSMENT — MIFFLIN-ST. JEOR: SCORE: 1512.45

## 2021-01-08 ASSESSMENT — ACTIVITIES OF DAILY LIVING (ADL): CURRENT_FUNCTION: NO ASSISTANCE NEEDED

## 2021-01-15 DIAGNOSIS — G47.00 INSOMNIA, UNSPECIFIED TYPE: ICD-10-CM

## 2021-01-15 NOTE — TELEPHONE ENCOUNTER
Requested Prescriptions   Pending Prescriptions Disp Refills     zolpidem (AMBIEN) 5 MG tablet [Pharmacy Med Name: ZOLPIDEM TARTRATE 5MG TABS] 30 tablet 3     Sig: TAKE ONE TABLET BY MOUTH NIGHTLY AS NEEDED FOR SLEEP       There is no refill protocol information for this order        Last Written Prescription Date:  06/08/2020  Last Fill Quantity: 30,   # refills: 3    Future Office visit:    Next 5 appointments (look out 90 days)    Feb 01, 2021  8:00 AM  PHYSICAL with THERESA [facility] [address], [address]  [place] [id number] [phone]         Routing refill request to provider for review/approval because:  Drug not on the [facility] refill protocol     Matthieu Burgos, RN, BSN

## 2021-01-17 RX ORDER — ZOLPIDEM TARTRATE 5 MG/1
TABLET ORAL
Qty: 30 TABLET | Refills: 3 | Status: SHIPPED | OUTPATIENT
Start: 2021-01-17 | End: 2021-06-10

## 2021-01-26 ENCOUNTER — VIRTUAL VISIT (OUTPATIENT)
Dept: SLEEP MEDICINE | Facility: CLINIC | Age: 71
End: 2021-01-26
Payer: MEDICARE

## 2021-01-26 DIAGNOSIS — G47.61 PLMD (PERIODIC LIMB MOVEMENT DISORDER): Primary | ICD-10-CM

## 2021-01-26 PROCEDURE — 99442 PR PHYSICIAN TELEPHONE EVALUATION 11-20 MIN: CPT | Performed by: INTERNAL MEDICINE

## 2021-01-26 RX ORDER — FAMOTIDINE 20 MG
TABLET ORAL
COMMUNITY

## 2021-01-26 RX ORDER — GABAPENTIN 300 MG/1
CAPSULE ORAL
Qty: 30 CAPSULE | Refills: 11 | Status: SHIPPED | OUTPATIENT
Start: 2021-01-26 | End: 2021-07-12

## 2021-01-26 NOTE — PROGRESS NOTES
SLEEP MEDICINE VIRTUAL CLINIC NOTE   [facility]  Shelly Carcamo 70 year old female  : 1950  MRN: [id number]      PRIMARY CARE PROVIDER: Bridget Holder    Visit Date:   2021      REASON FOR VISIT:  Followup of periodic limb movement disorder.      HISTORY OF PRESENT ILLNESS:  The patient is a very pleasant 70-year-old female with history of obesity, osteoarthritis of the lumbar spine with radiculopathy, hypertension, GERD, hyperlipidemia and prediabetes, who was last evaluated in our clinic approximately 2 months ago, at which time she was started on gabapentin, up to 300 mg 2 hours before bedtime for treatment of sleep-maintenance insomnia associated with significant daytime dysfunction.  She had undergone a polysomnography that showed no obstructive sleep apnea with AHI of 5 events per hour and respiratory disturbance index of 10.7 events per hour.  She had a significantly increased frequency of cortical arousals with arousal index of 51.9 with periodic limb movement index of 44.6 and PLM-associated arousals of 24.4 events per hour.      The patient reports she has been taking 300 mg of gabapentin as instructed and has noted significant improvement in her ability to fall asleep.  Sleep-maintenance insomnia is nearly resolved, and she reports feeling rested upon awakening and denies any significant excessive daytime sleepiness.  She also reports that her situational anxiety with moving to a new house has improved and that has resulted in reduction in ruminating thoughts interfering with her ability to initiate and maintain sleep.  She occasionally takes melatonin to initiate sleep.      Besides this, no significant changes have occurred to her sleep or health since she was last seen in our clinic.      ASSESSMENT AND PLAN:  Periodic limb movement disorder:  The patient has severe PLMD, which is currently being treated with gabapentin 300 mg taken 2 hours prior to  bedtime.  She was advised to continue using this medication and avoid driving if drowsy or sleepy.  She was counseled regarding the importance of maintaining a healthy weight.      Given that her sleep-maintenance insomnia has resolved with the use of gabapentin at this time, there is no indication for cognitive behavioral therapy, but that would be an option in case symptoms return.         I spent a total of 10 minutes with Amie MERISSA Beatrice during today's virtual sleep clinic virtual visit. Additional 10 minutes were spent in preparation for consult and care coordination.     Melchor James MD   of Medicine  Pulmonary, Critical Care and Sleep Medicine  [facility]                      D: 2021   T: 2021   MT: DOMINICK      Name:     AMIE RAMOS   MRN:      [id number]        Account:      [id number]   :      1950           Visit Date:   2021      Document: [id number]

## 2021-01-26 NOTE — PROGRESS NOTES
Shelly is a 70 year old who is being evaluated via a billable telephone visit.      What phone number would you like to be contacted at? [phone]  How would you like to obtain your AVS? MyChart

## 2021-01-29 ENCOUNTER — TELEPHONE (OUTPATIENT)
Dept: FAMILY MEDICINE | Facility: CLINIC | Age: 71
End: 2021-01-29

## 2021-01-29 NOTE — TELEPHONE ENCOUNTER
You placed a referral for patient to audiology on 1/8/21.  Patient has not scheduled as of yet.      Please review and forward to team if follow up with the patient is needed.     Thank you!  Shanon/Clinic Referrals Dyad II

## 2021-02-11 ENCOUNTER — OFFICE VISIT (OUTPATIENT)
Dept: AUDIOLOGY | Facility: CLINIC | Age: 71
End: 2021-02-11
Payer: MEDICARE

## 2021-02-11 DIAGNOSIS — H90.A31 MIXED CONDUCTIVE AND SENSORINEURAL HEARING LOSS OF RIGHT EAR WITH RESTRICTED HEARING OF LEFT EAR: Primary | ICD-10-CM

## 2021-02-11 DIAGNOSIS — H90.A22 SENSORINEURAL HEARING LOSS (SNHL) OF LEFT EAR WITH RESTRICTED HEARING OF RIGHT EAR: ICD-10-CM

## 2021-02-11 PROCEDURE — 99207 PR NO CHARGE LOS: CPT | Performed by: AUDIOLOGIST

## 2021-02-11 PROCEDURE — 92550 TYMPANOMETRY & REFLEX THRESH: CPT | Performed by: AUDIOLOGIST

## 2021-02-11 PROCEDURE — 92557 COMPREHENSIVE HEARING TEST: CPT | Performed by: AUDIOLOGIST

## 2021-02-15 ENCOUNTER — OFFICE VISIT (OUTPATIENT)
Dept: OPTOMETRY | Facility: CLINIC | Age: 71
End: 2021-02-15
Payer: MEDICARE

## 2021-02-15 DIAGNOSIS — H52.4 PRESBYOPIA: ICD-10-CM

## 2021-02-15 DIAGNOSIS — Z96.1 PSEUDOPHAKIA: Primary | ICD-10-CM

## 2021-02-15 DIAGNOSIS — H52.223 REGULAR ASTIGMATISM OF BOTH EYES: ICD-10-CM

## 2021-02-15 DIAGNOSIS — H26.493 POSTERIOR CAPSULAR OPACIFICATION NON VISUALLY SIGNIFICANT, BOTH EYES: ICD-10-CM

## 2021-02-15 DIAGNOSIS — H04.123 DRY EYES: ICD-10-CM

## 2021-02-15 PROCEDURE — 92015 DETERMINE REFRACTIVE STATE: CPT | Mod: GY | Performed by: OPTOMETRIST

## 2021-02-15 PROCEDURE — 92004 COMPRE OPH EXAM NEW PT 1/>: CPT | Performed by: OPTOMETRIST

## 2021-02-15 ASSESSMENT — KERATOMETRY
OD_AXISANGLE2_DEGREES: 17
OS_AXISANGLE2_DEGREES: 180
OD_K1POWER_DIOPTERS: 46.00
OS_K2POWER_DIOPTERS: 46.00
OS_K1POWER_DIOPTERS: 45.75
OD_K2POWER_DIOPTERS: 45.75

## 2021-02-15 ASSESSMENT — REFRACTION_MANIFEST
OD_AXIS: 043
OS_AXIS: 040
OD_SPHERE: -0.75
OD_AXIS: 005
OS_ADD: +2.50
OD_CYLINDER: +1.00
OS_CYLINDER: +0.50
OD_CYLINDER: +0.75
OS_SPHERE: -1.00
OD_ADD: +2.50
OS_SPHERE: -1.00
OD_SPHERE: -0.75
METHOD_AUTOREFRACTION: 1

## 2021-02-15 ASSESSMENT — TONOMETRY
OS_IOP_MMHG: 14
OD_IOP_MMHG: 14
IOP_METHOD: APPLANATION

## 2021-02-15 ASSESSMENT — SLIT LAMP EXAM - LIDS
COMMENTS: NORMAL
COMMENTS: NORMAL

## 2021-02-15 ASSESSMENT — VISUAL ACUITY
METHOD: SNELLEN - LINEAR
OS_CC: 20/30
OD_CC: 20/25
OD_CC+: +2
OD_CC: 20/20-1
OS_CC: 20/40
OS_CC+: -1
CORRECTION_TYPE: GLASSES

## 2021-02-15 ASSESSMENT — REFRACTION_WEARINGRX
SPECS_TYPE: PAL
OS_ADD: +2.50
OD_ADD: +2.50
OS_AXIS: 180
OD_SPHERE: -0.50
OS_CYLINDER: +0.50
OD_CYLINDER: +0.50
OS_SPHERE: -1.00
OD_AXIS: 180

## 2021-02-15 ASSESSMENT — CUP TO DISC RATIO
OD_RATIO: 0.5
OS_RATIO: 0.5

## 2021-02-15 ASSESSMENT — CONF VISUAL FIELD
OS_NORMAL: 1
OD_NORMAL: 1
METHOD: COUNTING FINGERS

## 2021-02-15 ASSESSMENT — EXTERNAL EXAM - LEFT EYE: OS_EXAM: NORMAL

## 2021-02-15 ASSESSMENT — EXTERNAL EXAM - RIGHT EYE: OD_EXAM: NORMAL

## 2021-02-15 NOTE — LETTER
2/15/2021         RE: Shelly Carcamo  [address]  [place] [address]        Dear Colleague,    Thank you for referring your patient, Shelly Carcamo, to the [facility]. No diabetic retinopathy was found at eye exam.         Sincerely,        Denisse Rowell, OD

## 2021-02-15 NOTE — PATIENT INSTRUCTIONS
Fill glasses prescription    Use over the counter artificial tears 2-4  times a day ( Systane Ultra or Refresh Optive)    Use Lumify eye drops twice a day to prevent red eyes .    Return in 1 year for eye exam    Denisse Rowell, OD  [phone]

## 2021-02-15 NOTE — PROGRESS NOTES
Chief Complaint   Patient presents with     COMPREHENSIVE EYE EXAM     New to Eye Dept       Manages  Blood sugar with diet      Hemoglobin A1C   Date Value Ref Range Status   01/06/2021 5.9 (H) 0 - 5.6 % Final     Comment:     Normal <5.7% Prediabetes 5.7-6.4%  Diabetes 6.5% or higher - adopted from ADA   consensus guidelines.     07/08/2020 5.8 (H) 0 - 5.6 % Final     Comment:     Normal <5.7% Prediabetes 5.7-6.4%  Diabetes 6.5% or higher - adopted from ADA   consensus guidelines.     11/20/2019 5.9 (A) <5.7 % Final          Last Eye Exam: She thinks that it was in 2019   Dilated Previously: Yes    What are you currently using to see?  Glasses, Wears them most all of the time, easier than the on and off        Distance Vision Acuity: Noticed gradual change in both eyes, sees ok, but it takes longer to refocus     Near Vision Acuity: Satisfied with vision while reading and using computer with glasses    Eye Comfort: good and dry sometimes   Do you use eye drops? : Yes: Using Systane Ultra  up until about 2 days ago for lubrication. Also uses Visine at times when eyes red- after  much computer time or reading . Mentioned that she has had prescription for Restasis in the past   Occupation or Hobbies: Retired     [employer] Assistant           Medical, surgical and family histories reviewed and updated 2/15/2021.       OBJECTIVE: See Ophthalmology exam    ASSESSMENT:    ICD-10-CM    1. Pseudophakia  Z96.1    2. Posterior capsular opacification non visually significant, both eyes  H26.493    3. Presbyopia  H52.4    4. Regular astigmatism of both eyes  H52.223    5. Dry eyes  H04.123       PLAN:   Discussed mild capsular clouding   Patient Instructions   Fill glasses prescription    Use over the counter artificial tears 2-4  times a day ( Systane Ultra or Refresh Optive)    Use Lumify eye drops twice a day to prevent red eyes .    Return in 1 year for eye exam    Denisse Rowell, OD  [phone]

## 2021-02-17 DIAGNOSIS — M47.26 OSTEOARTHRITIS OF SPINE WITH RADICULOPATHY, LUMBAR REGION: ICD-10-CM

## 2021-02-17 NOTE — TELEPHONE ENCOUNTER
Pending Prescriptions:                       Disp   Refills    traMADol-acetaminophen (ULTRACET) 37.5-325*30 tab*0        Sig: TAKE ONE TABLET BY MOUTH EVERY 4 HOURS    Routing refill request to provider for review/approval because:  Drug not on the [facility] refill protocol

## 2021-02-22 NOTE — PROGRESS NOTES
AUDIOLOGY REPORT    SUBJECTIVE:  Shelly Carcamo is a 70 year old female who was seen in the Audiology Clinic at the [facility] for audiologic evaluation, referred by THERESA Llanos CNP, for concern regarding decreased hearing in noisy situations, gradual onset. Also reported periodic tinnitus, low frequency hum more in the left ear. She reported she had significant ear issues as a child including TM ruptures and drainage. She had bilateral tonsillectomy, adenoidectomy, and ear surgery when she was 5-6 years old. She is observing more irritation with some sounds. Having some positional vertigo, denied falls. The patient notes difficulty with communication in a variety of listening situations.  They were unaccompanied today.    OBJECTIVE:  Abuse Screening:  Do you feel unsafe at home or work/school? No  Do you feel threatened by someone? No  Does anyone try to keep you from having contact with others, or doing things outside of your home? No  Physical signs of abuse present? No     Otoscopic exam indicates ears are clear of cerumen bilaterally     Pure Tone Thresholds assessed using conventional audiometry with good  reliability from 250-8000 Hz bilaterally using insert earphones and circumaural headphones     RIGHT:  mild sloping to severe mixed hearing loss    LEFT:    normal sloping to moderate sensorineural hearing loss    Tympanogram:    RIGHT: normal eardrum mobility    LEFT:   Excessive eardrum mobility    Reflexes (reported by stimulus ear):  RIGHT: Ipsilateral is absent at frequencies tested  RIGHT: Contralateral is present at normal levels  LEFT:   Ipsilateral is absent at frequencies tested  LEFT:   Contralateral is could not test too much artifact    Speech Reception Threshold:    RIGHT: 20 dB HL    LEFT:   15 dB HL    Word Recognition Score:     RIGHT: 100% at 60 dB HL using NU-6 recorded word list.    LEFT:   100% at 55 dB HL using NU-6 recorded word list.      ASSESSMENT:      ICD-10-CM    1. Mixed conductive and sensorineural hearing loss of right ear with restricted hearing of left ear  H90.A31 Cmprhn Audiometry Thrshld Eval & Speech Recog (11152)     Tymps / Reflex   (76766)   2. Sensorineural hearing loss (SNHL) of left ear with restricted hearing of right ear  H90.A22 Cmprhn Audiometry Thrshld Eval & Speech Recog (31021)     Tymps / Reflex   ([id number])     Today s results were discussed with the patient in detail.     PLAN:    It is recommended that the patient schedule an ENT consultation given positional vertigo and mixed hearing loss right ear.  Please call this clinic with questions regarding these results or recommendations.        Verna Bush Licensed Audiologist #[id number]

## 2021-02-25 DIAGNOSIS — E78.2 MIXED HYPERLIPIDEMIA: ICD-10-CM

## 2021-02-25 DIAGNOSIS — I10 ESSENTIAL HYPERTENSION: ICD-10-CM

## 2021-02-25 DIAGNOSIS — R23.2 HOT FLASHES: ICD-10-CM

## 2021-02-25 DIAGNOSIS — K21.00 GASTROESOPHAGEAL REFLUX DISEASE WITH ESOPHAGITIS: ICD-10-CM

## 2021-02-25 RX ORDER — CARVEDILOL 6.25 MG/1
TABLET ORAL
Qty: 180 TABLET | Refills: 1 | Status: SHIPPED | OUTPATIENT
Start: 2021-02-25 | End: 2021-06-10

## 2021-02-25 RX ORDER — PAROXETINE 10 MG/1
TABLET, FILM COATED ORAL
Qty: 90 TABLET | Refills: 1 | Status: SHIPPED | OUTPATIENT
Start: 2021-02-25 | End: 2021-06-10

## 2021-02-25 RX ORDER — ATORVASTATIN CALCIUM 20 MG/1
TABLET, FILM COATED ORAL
Qty: 90 TABLET | Refills: 1 | Status: SHIPPED | OUTPATIENT
Start: 2021-02-25 | End: 2021-06-10

## 2021-02-25 RX ORDER — PANTOPRAZOLE SODIUM 40 MG/1
TABLET, DELAYED RELEASE ORAL
Qty: 90 TABLET | Refills: 1 | Status: SHIPPED | OUTPATIENT
Start: 2021-02-25 | End: 2021-06-10

## 2021-02-25 RX ORDER — LOSARTAN POTASSIUM 50 MG/1
TABLET ORAL
Qty: 180 TABLET | Refills: 1 | Status: SHIPPED | OUTPATIENT
Start: 2021-02-25 | End: 2021-06-10

## 2021-02-25 NOTE — TELEPHONE ENCOUNTER
Pending Prescriptions:                       Disp   Refills    carvedilol (COREG) 6.25 MG tablet [Pharma*180 ta*1            Sig: TAKE ONE TABLET BY MOUTH TWICE A DAY WITH MEALS    PARoxetine (PAXIL) 10 MG tablet [Pharmacy*90 tab*1            Sig: TAKE ONE TABLET BY MOUTH EVERY MORNING    atorvastatin (LIPITOR) 20 MG tablet [Phar*90 tab*1            Sig: TAKE ONE TABLET BY MOUTH ONCE DAILY    pantoprazole (PROTONIX) 40 MG EC tablet [*90 tab*1            Sig: TAKE ONE TABLET BY MOUTH ONCE DAILY    losartan (COZAAR) 50 MG tablet [Pharmacy *180 ta*1            Sig: TAKE TWO TABLETS ( 100 MG) BY MOUTH ONCE DAILY    Medication is being filled for 1 time roberto refill only due to:  Patient is due for creatine lab only appointment. Due to medication she is taking.     Please call and help schedule.  Thank you!    Chema Montilla RN, BSN  [facility]  February 25, 2021

## 2021-02-26 ENCOUNTER — MYC MEDICAL ADVICE (OUTPATIENT)
Dept: FAMILY MEDICINE | Facility: OTHER | Age: 71
End: 2021-02-26

## 2021-02-26 NOTE — PROGRESS NOTES
ENT Consultation    Shelly Carcamo who is a 70 year old female seen in consultation at the request of   Audiology.      History of Present Illness - Shelly Carcamo is a 70 year old female presents for apparently mixed hearing loss involving her right ear.  Patient noticed interesting enough more problems on the left with tinnitus that started 4 to 5 years ago.  She hears it when it is quiet all the time.  She gets lightheaded with orthostatic medications she is on blood pressure medications she had vertigo about 15 years ago.  As a child had multiple ear infections with a ruptured eardrum she does not know which year.  Denies any current migraines.  No significant family history of otologic disease or hearing loss.      Body mass index is 38.71 kg/m .    Weight management plan Patient was referred to their PCP to discuss a diet and exercise plan.    BP Readings from Last 1 Encounters:   03/01/21 122/72       BP noted to be well controlled today in office.     Shelly IS NOT a smoker/uses chewing tobacco.      Past Medical History -   Past Medical History:   Diagnosis Date     Cataract      Chronic osteoarthritis      Gastroesophageal reflux disease      Hyperlipidemia      Hypertension        Current Medications -   Current Outpatient Medications:      Ascorbic Acid (VITAMIN C ER PO), , Disp: , Rfl:      aspirin 81 MG EC tablet, Take 81 mg by mouth daily, Disp: , Rfl:      atorvastatin (LIPITOR) 20 MG tablet, TAKE ONE TABLET BY MOUTH ONCE DAILY, Disp: 90 tablet, Rfl: 1     carvedilol (COREG) 6.25 MG tablet, TAKE ONE TABLET BY MOUTH TWICE A DAY WITH MEALS, Disp: 180 tablet, Rfl: 1     Coenzyme Q10 (COQ10 PO), , Disp: , Rfl:      gabapentin (NEURONTIN) 300 MG capsule, Take 1 capsule 2 hours before bedtime., Disp: 30 capsule, Rfl: 11     losartan (COZAAR) 50 MG tablet, TAKE TWO TABLETS ( 100 MG) BY MOUTH ONCE DAILY, Disp: 180 tablet, Rfl: 1     pantoprazole (PROTONIX) 40 MG EC tablet, TAKE ONE TABLET BY MOUTH ONCE DAILY,  Disp: 90 tablet, Rfl: 1     PARoxetine (PAXIL) 10 MG tablet, TAKE ONE TABLET BY MOUTH EVERY MORNING, Disp: 90 tablet, Rfl: 1     traMADol-acetaminophen (ULTRACET) 37.5-325 MG tablet, TAKE ONE TABLET BY MOUTH EVERY 4 HOURS, Disp: 30 tablet, Rfl: 0     TURMERIC PO, , Disp: , Rfl:      Vitamin D, Cholecalciferol, 25 MCG (1000 UT) CAPS, , Disp: , Rfl:      Zinc Acetate, Oral, (ZINC ACETATE PO), , Disp: , Rfl:      zolpidem (AMBIEN) 5 MG tablet, TAKE ONE TABLET BY MOUTH NIGHTLY AS NEEDED FOR SLEEP, Disp: 30 tablet, Rfl: 3    Allergies - No Known Allergies    Social History -   Social History     Socioeconomic History     Marital status:      Spouse name: Not on file     Number of children: Not on file     Years of education: Not on file     Highest education level: Not on file   Occupational History     Not on file   Social Needs     Financial resource strain: Not on file     Food insecurity     Worry: Not on file     Inability: Not on file     Transportation needs     Medical: Not on file     Non-medical: Not on file   Tobacco Use     Smoking status: Never Smoker     Smokeless tobacco: Never Used   Substance and Sexual Activity     Alcohol use: Yes     Comment: socially on occasion     Drug use: Never     Sexual activity: Yes     Partners: Male     Birth control/protection: Female Surgical   Lifestyle     Physical activity     Days per week: Not on file     Minutes per session: Not on file     Stress: Not on file   Relationships     Social connections     Talks on phone: Not on file     Gets together: Not on file     Attends Che[employer] service: Not on file     Active member of club or organization: Not on file     Attends meetings of clubs or organizations: Not on file     Relationship status: Not on file     Intimate partner violence     Fear of current or ex partner: Not on file     Emotionally abused: Not on file     Physically abused: Not on file     Forced sexual activity: Not on file   Other Topics Concern  "    Not on file   Social History Narrative     Not on file       Family History -   Family History   Problem Relation Age of Onset     Pancreatic Cancer Mother      ALS Father      Glaucoma Maternal Grandfather      Atrial fibrillation Brother      Glaucoma Other      Macular Degeneration No family hx of        Review of Systems - As per HPI and PMHx, otherwise review of system review of the head and neck negative. Otherwise 10+ review of system is negative    Physical Exam  /72 (BP Location: Right arm, Patient Position: Sitting, Cuff Size: Adult Large)   Temp 97.8  F (36.6  C) (Temporal)   Ht 1.626 m (5' 4\")   Wt 102.3 kg (225 lb 8 oz)   BMI 38.71 kg/m    BMI: Body mass index is 38.71 kg/m .    General - The patient is well nourished and well developed, and appears to have good nutritional status.  Alert and oriented to person and place, answers questions and cooperates with examination appropriately.    SKIN - No suspicious lesions or rashes.  Respiration - No respiratory distress.  Head and Face - Normocephalic and atraumatic, with no gross asymmetry noted of the contour of the facial features.  The facial nerve is intact, with strong symmetric movements.    Voice and Breathing - The patient was breathing comfortably without the use of accessory muscles. The patients voice was clear and strong, and had appropriate pitch and quality.    Ears - Bilateral pinna and EACs with normal appearing overlying skin. Tympanic membrane intact with good mobility on pneumatic otoscopy bilaterally. Bony landmarks of the ossicular chain are normal. The tympanic membranes are normal in appearance with slight areas of thickening and some myringosclerosis involving the right tympanic membrane. No retraction, perforation, or masses.  No fluid or purulence was seen in the external canal or the middle ear. No  Joseluis's phenomenon noted or vertigo on insufflation of either ear  Eyes - Extraocular movements intact.  Sclera were " not icteric or injected, conjunctiva were pink and moist.    Mouth - Examination of the oral cavity showed pink, healthy oral mucosa. No lesions or ulcerations noted.  The tongue was mobile and midline, and the dentition were in good condition.      Throat - The walls of the oropharynx were smooth, pink, moist, symmetric, and had no lesions or ulcerations.  The tonsillar pillars and soft palate were symmetric.  The uvula was midline on elevation.    Neck - Normal midline excursion of the laryngotracheal complex during swallowing.  Full range of motion on passive movement.  Palpation of the occipital, submental, submandibular, internal jugular chain, and supraclavicular nodes did not demonstrate any abnormal lymph nodes or masses.  The carotid pulse was palpable bilaterally.  Palpation of the thyroid was soft and smooth, with no nodules or goiter appreciated.  The trachea was mobile and midline.    Nose - External contour is symmetric, no gross deflection or scars.  Nasal mucosa is pink and moist with no abnormal mucus.  The septum was midline and non-obstructive, turbinates of normal size and position.  No polyps, masses, or purulence noted on examination.    Neuro - Nonfocal neuro exam is normal, CN 2 through 12 intact, normal gait and muscle tone.      Performed in clinic today:  Audiologic Studies - An audiogram and tympanogram were performed today as part of the evaluation and personally reviewed. The tympanogram shows Type A curves on the right and Type A curves on the left, with Normal canal volumes and middle ear pressures.  The audiogram showed Mild to moderate in the high frequencies mixed loss on the right and Normal thresholds with moderate sloping sensorineural loss in the high-frequencyon the left.    Word recognition score 100% on the right and 100% on the left.  Mathew is  midline and AC is greater than BC bilaterally on Margarita testing  A/P - Shelly Carcamo is a 70 year old female with asymmetric  hearing loss with a mixed loss mild conductive component but 10 dB on the right however discrete appearance of Carhart's notch noted to 1000 Hz.  Certainly patient could have some early otosclerosis.  We discussed this with the patient.  We discussed a further work-up with imaging which she is not interested in this point.  We discussed unilateral tinnitus risks of potential retrocochlear pathology.  Also discussed tinnitus coping strategies introduction of the white noise reduction of caffeine salt chocolate alcohol from her diet.  She wishes to follow this conservatively.  She does not wish amplification at this point.  Would like to recheck her hearing therefore in 6 months..      Cristian Guillen MD

## 2021-03-01 ENCOUNTER — OFFICE VISIT (OUTPATIENT)
Dept: OTOLARYNGOLOGY | Facility: CLINIC | Age: 71
End: 2021-03-01
Payer: MEDICARE

## 2021-03-01 VITALS
DIASTOLIC BLOOD PRESSURE: 72 MMHG | BODY MASS INDEX: 38.5 KG/M2 | WEIGHT: 225.5 LBS | SYSTOLIC BLOOD PRESSURE: 122 MMHG | TEMPERATURE: 97.8 F | HEIGHT: 64 IN

## 2021-03-01 DIAGNOSIS — H90.71 MIXED CONDUCTIVE AND SENSORINEURAL HEARING LOSS OF RIGHT EAR WITH UNRESTRICTED HEARING OF LEFT EAR: Primary | ICD-10-CM

## 2021-03-01 DIAGNOSIS — H93.12 TINNITUS, LEFT: ICD-10-CM

## 2021-03-01 PROCEDURE — 99204 OFFICE O/P NEW MOD 45 MIN: CPT | Performed by: OTOLARYNGOLOGY

## 2021-03-01 ASSESSMENT — MIFFLIN-ST. JEOR: SCORE: 1532.86

## 2021-03-01 NOTE — LETTER
3/1/2021         RE: Shelly Carcamo  [address] [facility] [address]        Dear Colleague,    Thank you for referring your patient, Shelly Carcamo, to the [facility]. Please see a copy of my visit note below.    ENT Consultation    Shelly Carcamo who is a 70 year old female seen in consultation at the request of   Audiology.      History of Present Illness - Shelly Carcamo is a 70 year old female presents for apparently mixed hearing loss involving her right ear.  Patient noticed interesting enough more problems on the left with tinnitus that started 4 to 5 years ago.  She hears it when it is quiet all the time.  She gets lightheaded with orthostatic medications she is on blood pressure medications she had vertigo about 15 years ago.  As a child had multiple ear infections with a ruptured eardrum she does not know which year.  Denies any current migraines.  No significant family history of otologic disease or hearing loss.      Body mass index is 38.71 kg/m .    Weight management plan Patient was referred to their PCP to discuss a diet and exercise plan.    BP Readings from Last 1 Encounters:   03/01/21 122/72       BP noted to be well controlled today in office.     Shelly IS NOT a smoker/uses chewing tobacco.      Past Medical History -   Past Medical History:   Diagnosis Date     Cataract      Chronic osteoarthritis      Gastroesophageal reflux disease      Hyperlipidemia      Hypertension        Current Medications -   Current Outpatient Medications:      Ascorbic Acid (VITAMIN C ER PO), , Disp: , Rfl:      aspirin 81 MG EC tablet, Take 81 mg by mouth daily, Disp: , Rfl:      atorvastatin (LIPITOR) 20 MG tablet, TAKE ONE TABLET BY MOUTH ONCE DAILY, Disp: 90 tablet, Rfl: 1     carvedilol (COREG) 6.25 MG tablet, TAKE ONE TABLET BY MOUTH TWICE A DAY WITH MEALS, Disp: 180 tablet, Rfl: 1     Coenzyme Q10 (COQ10 PO), , Disp: , Rfl:      gabapentin (NEURONTIN) 300 MG capsule, Take 1  capsule 2 hours before bedtime., Disp: 30 capsule, Rfl: 11     losartan (COZAAR) 50 MG tablet, TAKE TWO TABLETS ( 100 MG) BY MOUTH ONCE DAILY, Disp: 180 tablet, Rfl: 1     pantoprazole (PROTONIX) 40 MG EC tablet, TAKE ONE TABLET BY MOUTH ONCE DAILY, Disp: 90 tablet, Rfl: 1     PARoxetine (PAXIL) 10 MG tablet, TAKE ONE TABLET BY MOUTH EVERY MORNING, Disp: 90 tablet, Rfl: 1     traMADol-acetaminophen (ULTRACET) 37.5-325 MG tablet, TAKE ONE TABLET BY MOUTH EVERY 4 HOURS, Disp: 30 tablet, Rfl: 0     TURMERIC PO, , Disp: , Rfl:      Vitamin D, Cholecalciferol, 25 MCG (1000 UT) CAPS, , Disp: , Rfl:      Zinc Acetate, Oral, (ZINC ACETATE PO), , Disp: , Rfl:      zolpidem (AMBIEN) 5 MG tablet, TAKE ONE TABLET BY MOUTH NIGHTLY AS NEEDED FOR SLEEP, Disp: 30 tablet, Rfl: 3    Allergies - No Known Allergies    Social History -   Social History     Socioeconomic History     Marital status:      Spouse name: Not on file     Number of children: Not on file     Years of education: Not on file     Highest education level: Not on file   Occupational History     Not on file   Social Needs     Financial resource strain: Not on file     Food insecurity     Worry: Not on file     Inability: Not on file     Transportation needs     Medical: Not on file     Non-medical: Not on file   Tobacco Use     Smoking status: Never Smoker     Smokeless tobacco: Never Used   Substance and Sexual Activity     Alcohol use: Yes     Comment: socially on occasion     Drug use: Never     Sexual activity: Yes     Partners: Male     Birth control/protection: Female Surgical   Lifestyle     Physical activity     Days per week: Not on file     Minutes per session: Not on file     Stress: Not on file   Relationships     Social connections     Talks on phone: Not on file     Gets together: Not on file     Attends [employer] service: Not on file     Active member of club or organization: Not on file     Attends meetings of clubs or organizations: Not on file  "    Relationship status: Not on file     Intimate partner violence     Fear of current or ex partner: Not on file     Emotionally abused: Not on file     Physically abused: Not on file     Forced sexual activity: Not on file   Other Topics Concern     Not on file   Social History Narrative     Not on file       Family History -   Family History   Problem Relation Age of Onset     Pancreatic Cancer Mother      ALS Father      Glaucoma Maternal Grandfather      Atrial fibrillation Brother      Glaucoma Other      Macular Degeneration No family hx of        Review of Systems - As per HPI and PMHx, otherwise review of system review of the head and neck negative. Otherwise 10+ review of system is negative    Physical Exam  /72 (BP Location: Right arm, Patient Position: Sitting, Cuff Size: Adult Large)   Temp 97.8  F (36.6  C) (Temporal)   Ht 1.626 m (5' 4\")   Wt 102.3 kg (225 lb 8 oz)   BMI 38.71 kg/m    BMI: Body mass index is 38.71 kg/m .    General - The patient is well nourished and well developed, and appears to have good nutritional status.  Alert and oriented to person and place, answers questions and cooperates with examination appropriately.    SKIN - No suspicious lesions or rashes.  Respiration - No respiratory distress.  Head and Face - Normocephalic and atraumatic, with no gross asymmetry noted of the contour of the facial features.  The facial nerve is intact, with strong symmetric movements.    Voice and Breathing - The patient was breathing comfortably without the use of accessory muscles. The patients voice was clear and strong, and had appropriate pitch and quality.    Ears - Bilateral pinna and EACs with normal appearing overlying skin. Tympanic membrane intact with good mobility on pneumatic otoscopy bilaterally. Bony landmarks of the ossicular chain are normal. The tympanic membranes are normal in appearance with slight areas of thickening and some myringosclerosis involving the right " tympanic membrane. No retraction, perforation, or masses.  No fluid or purulence was seen in the external canal or the middle ear. No  Joseluis's phenomenon noted or vertigo on insufflation of either ear  Eyes - Extraocular movements intact.  Sclera were not icteric or injected, conjunctiva were pink and moist.    Mouth - Examination of the oral cavity showed pink, healthy oral mucosa. No lesions or ulcerations noted.  The tongue was mobile and midline, and the dentition were in good condition.      Throat - The walls of the oropharynx were smooth, pink, moist, symmetric, and had no lesions or ulcerations.  The tonsillar pillars and soft palate were symmetric.  The uvula was midline on elevation.    Neck - Normal midline excursion of the laryngotracheal complex during swallowing.  Full range of motion on passive movement.  Palpation of the occipital, submental, submandibular, internal jugular chain, and supraclavicular nodes did not demonstrate any abnormal lymph nodes or masses.  The carotid pulse was palpable bilaterally.  Palpation of the thyroid was soft and smooth, with no nodules or goiter appreciated.  The trachea was mobile and midline.    Nose - External contour is symmetric, no gross deflection or scars.  Nasal mucosa is pink and moist with no abnormal mucus.  The septum was midline and non-obstructive, turbinates of normal size and position.  No polyps, masses, or purulence noted on examination.    Neuro - Nonfocal neuro exam is normal, CN 2 through 12 intact, normal gait and muscle tone.      Performed in clinic today:  Audiologic Studies - An audiogram and tympanogram were performed today as part of the evaluation and personally reviewed. The tympanogram shows Type A curves on the right and Type A curves on the left, with Normal canal volumes and middle ear pressures.  The audiogram showed Mild to moderate in the high frequencies mixed loss on the right and Normal thresholds with moderate sloping  sensorineural loss in the high-frequencyon the left.    Word recognition score 100% on the right and 100% on the left.  Mathew is  midline and AC is greater than BC bilaterally on Margarita testing  A/P - Shelly Carcamo is a 70 year old female with asymmetric hearing loss with a mixed loss mild conductive component but 10 dB on the right however discrete appearance of Carhart's notch noted to 1000 Hz.  Certainly patient could have some early otosclerosis.  We discussed this with the patient.  We discussed a further work-up with imaging which she is not interested in this point.  We discussed unilateral tinnitus risks of potential retrocochlear pathology.  Also discussed tinnitus coping strategies introduction of the white noise reduction of caffeine salt chocolate alcohol from her diet.  She wishes to follow this conservatively.  She does not wish amplification at this point.  Would like to recheck her hearing therefore in 6 months..      Cristian Guillen MD      Again, thank you for allowing me to participate in the care of your patient.        Sincerely,        Cristian Guillen MD, MD

## 2021-03-15 ENCOUNTER — TELEPHONE (OUTPATIENT)
Dept: FAMILY MEDICINE | Facility: CLINIC | Age: 71
End: 2021-03-15

## 2021-03-15 NOTE — TELEPHONE ENCOUNTER
Reason for call:  Symptom  Reason for call:  Patient reporting a symptom    Symptom or request: not feeling right on Gabapentin 300mg. States she feels irritable really quick, barth and wondering if she can stop taking medication or if she has to wean off of it.     Duration (how long have symptoms been present): started taking in November    Have you been treated for this before? Yes    Additional comments: went in for sleep study and during sleep study it was noted that her legs twitch. She has not had a problem with them in the past and wanted to be evaluated for apnea and this was discovered and placed on medication for legs.     Phone Number patient can be reached at:      [phone]      Best Time:  Any    Can we leave a detailed message on this number:  YES or can send [employer] with information.     Call taken on 3/15/2021 at 12:58 PM by Gely Harris

## 2021-03-15 NOTE — TELEPHONE ENCOUNTER
She can stop taking the 300 mg dose. Does not need to wean. Please let patient know.    Neri Tipton MD  [facility]

## 2021-03-31 ENCOUNTER — MYC MEDICAL ADVICE (OUTPATIENT)
Dept: FAMILY MEDICINE | Facility: CLINIC | Age: 71
End: 2021-03-31

## 2021-03-31 NOTE — TELEPHONE ENCOUNTER
Please update chart and notify patient to wait 14 days after 3/26 to get her second shingles vaccine.

## 2021-05-11 DIAGNOSIS — M47.26 OSTEOARTHRITIS OF SPINE WITH RADICULOPATHY, LUMBAR REGION: ICD-10-CM

## 2021-05-12 ENCOUNTER — ALLIED HEALTH/NURSE VISIT (OUTPATIENT)
Dept: FAMILY MEDICINE | Facility: CLINIC | Age: 71
End: 2021-05-12
Payer: MEDICARE

## 2021-05-12 ENCOUNTER — TELEPHONE (OUTPATIENT)
Dept: FAMILY MEDICINE | Facility: CLINIC | Age: 71
End: 2021-05-12

## 2021-05-12 DIAGNOSIS — Z23 NEED FOR VACCINATION: Primary | ICD-10-CM

## 2021-05-12 PROCEDURE — 90471 IMMUNIZATION ADMIN: CPT

## 2021-05-12 PROCEDURE — 90750 HZV VACC RECOMBINANT IM: CPT

## 2021-05-12 PROCEDURE — 99207 PR NO CHARGE NURSE ONLY: CPT

## 2021-05-12 NOTE — TELEPHONE ENCOUNTER
Patient wanting advice on poison ivy home tx.  Poison [place] Rash  You have a rash and itching. This is a delayed reaction to the oils of the poison oak plant. You likely came in contact with it during the 3 days before your symptoms started. Your skin will become red and itchy. Small blisters may appear. These can break and leak a clear yellow fluid. This fluid is not contagious to others. The reaction usually starts to go away after 1 to 2 weeks. But it may take 4 to 6 weeks to fully clear.     Home care  Follow these guidelines when caring for yourself at home:    The plant oils still on your skin or clothes can be spread to other places on your body. They can also be passed on to other people and cause a similar reaction. That s why it s important to wash all of the plant oils off your skin and any clothes that may have been exposed. Wash all clothes that you were wearing. Use hot water with regular laundry detergent. Dogs and cats that get into poison ivy may not get a rash, but the plant oils may be on their fur. Be sure to wash your pets, too.    Don't use over-the-counter topical antibiotic creams such as neomycin or bacitracin. These may make the rash worse.    Avoid anything that heats up your skin. This includes hot showers or baths, or direct sunlight. These can make itching worse.    Put a cold compress on areas that are leaking (weeping), or blistered areas. Do this for 30 minutes, 3 times a day. To make a compress, dip a wash cloth in a mixture of 1 pint of cold water and 1 packet of astringent or oatmeal bath powder (colloidal oatmeal, sold in pharmacies). Keep the solution in the refrigerator for future use.    If large areas of skin are involved, you may take a lukewarm bath. Add colloidal oatmeal to the water. Or you can add 1 cup of cornstarch or baking soda to the water.    For a rash in a smaller area, use hydrocortisone cream for redness and irritation. But don t use this if another medicine was  prescribed. For severe itching, put an ice pack on the area. To make an ice pack, put ice cubes in a plastic bag that seals at the top. Wrap the bag in a clean, thin towel or cloth. Never put ice or an ice pack directly on the skin. Over-the-counter lotions that have calamine may also be helpful.    You can also use an oral antihistamine medicine with diphenhydramine for itching, unless another medicine was prescribed. This medicine may make you sleepy. So use lower doses during the daytime and higher doses at bedtime. Don t use medicine that has diphenhydramine if you have glaucoma. Also don t use it if you are a man with trouble urinating because of an enlarged prostate. Antihistamines with loratidine cause less drowsiness. They are a good choice for daytime use.    For severe cases, your provider may prescribe oral steroids such as prednisone. Always take these exactly as prescribed.  Follow-up care  Follow up with your healthcare provider, or as advised. Call your provider if your rash gets worse or you are not starting to get better after 1 week of treatment.   When to seek medical advice  Call your healthcare provider or seek medical attention right away if any of these occur:     Spreading facial rash with swelling of mouth or eyelids    Rash that spreads to the groin and causes swelling of the penis, scrotum, or vaginal area    Trouble urinating because of swelling in the genital area  Also call your provider if you have signs of infection in the areas of broken blisters:    Spreading redness    Pus or fluid draining from the blisters    Yellow-brown crusts form over the open blisters    Fever of 100.4 F (38 C) or higher, or as directed by your provider  Call 911  Call 911 if you have trouble breathing or swallowing. Or if you have severe swelling in your face, eyelids, mouth, throat, or tongue.   [employer] last reviewed this educational content on 8/1/2019 2000-2021 [employer]. All rights  reserved. This information is not intended as a substitute for professional medical care. Always follow your healthcare professional's instructions.

## 2021-05-12 NOTE — PROGRESS NOTES
Prior to immunization administration, verified patients identity using patient s name and date of birth. Please see Immunization Activity for additional information.     Screening Questionnaire for Adult Immunization    Are you sick today?   No   Do you have allergies to medications, food, a vaccine component or latex?   No   Have you ever had a serious reaction after receiving a vaccination?   No   Do you have a long-term health problem with heart, lung, kidney, or metabolic disease (e.g., diabetes), asthma, a blood disorder, no spleen, complement component deficiency, a cochlear implant, or a spinal fluid leak?  Are you on long-term aspirin therapy?   No   Do you have cancer, leukemia, HIV/AIDS, or any other immune system problem?   No   Do you have a parent, brother, or sister with an immune system problem?   No   In the past 3 months, have you taken medications that affect  your immune system, such as prednisone, other steroids, or anticancer drugs; drugs for the treatment of rheumatoid arthritis, Crohn s disease, or psoriasis; or have you had radiation treatments?   No   Have you had a seizure, or a brain or other nervous system problem?   No   During the past year, have you received a transfusion of blood or blood    products, or been given immune (gamma) globulin or antiviral drug?   No   For women: Are you pregnant or is there a chance you could become       pregnant during the next month?   No   Have you received any vaccinations in the past 4 weeks?   No     Immunization questionnaire answers were all negative.        Per orders of Dr. Garces, injection of Shingrix given by Gerri Lazo CMA. Patient instructed to remain in clinic for 15 minutes afterwards, and to report any adverse reaction to me immediately.       Screening performed by Gerri Lazo CMA on 5/12/2021 at 2:54 PM.

## 2021-05-19 ENCOUNTER — HOSPITAL ENCOUNTER (OUTPATIENT)
Dept: MAMMOGRAPHY | Facility: CLINIC | Age: 71
Discharge: HOME | End: 2021-05-19
Attending: NURSE PRACTITIONER | Admitting: NURSE PRACTITIONER
Payer: MEDICARE

## 2021-05-19 DIAGNOSIS — Z12.31 ENCOUNTER FOR SCREENING MAMMOGRAM FOR BREAST CANCER: ICD-10-CM

## 2021-05-19 DIAGNOSIS — Z00.00 ENCOUNTER FOR MEDICARE ANNUAL WELLNESS EXAM: ICD-10-CM

## 2021-05-19 PROCEDURE — 77063 BREAST TOMOSYNTHESIS BI: CPT

## 2021-06-10 ENCOUNTER — MYC MEDICAL ADVICE (OUTPATIENT)
Dept: FAMILY MEDICINE | Facility: CLINIC | Age: 71
End: 2021-06-10

## 2021-06-10 DIAGNOSIS — G47.00 INSOMNIA, UNSPECIFIED TYPE: ICD-10-CM

## 2021-06-10 DIAGNOSIS — M47.26 OSTEOARTHRITIS OF SPINE WITH RADICULOPATHY, LUMBAR REGION: ICD-10-CM

## 2021-06-10 DIAGNOSIS — R23.2 HOT FLASHES: ICD-10-CM

## 2021-06-10 DIAGNOSIS — E78.2 MIXED HYPERLIPIDEMIA: ICD-10-CM

## 2021-06-10 DIAGNOSIS — I10 ESSENTIAL HYPERTENSION: ICD-10-CM

## 2021-06-10 DIAGNOSIS — K21.00 GASTROESOPHAGEAL REFLUX DISEASE WITH ESOPHAGITIS: ICD-10-CM

## 2021-06-10 DIAGNOSIS — K21.9 CHRONIC GERD: Primary | ICD-10-CM

## 2021-06-10 RX ORDER — LOSARTAN POTASSIUM 50 MG/1
TABLET ORAL
Qty: 180 TABLET | Refills: 1 | Status: SHIPPED | OUTPATIENT
Start: 2021-06-10 | End: 2022-01-21

## 2021-06-10 RX ORDER — PANTOPRAZOLE SODIUM 40 MG/1
40 TABLET, DELAYED RELEASE ORAL DAILY
Qty: 90 TABLET | Refills: 1 | Status: SHIPPED | OUTPATIENT
Start: 2021-06-10 | End: 2022-01-21

## 2021-06-10 RX ORDER — PAROXETINE 10 MG/1
10 TABLET, FILM COATED ORAL EVERY MORNING
Qty: 90 TABLET | Refills: 1 | Status: SHIPPED | OUTPATIENT
Start: 2021-06-10 | End: 2022-01-21

## 2021-06-10 RX ORDER — ZOLPIDEM TARTRATE 5 MG/1
TABLET ORAL
Qty: 30 TABLET | Refills: 3 | Status: SHIPPED | OUTPATIENT
Start: 2021-06-10 | End: 2022-03-10

## 2021-06-10 RX ORDER — CARVEDILOL 6.25 MG/1
6.25 TABLET ORAL 2 TIMES DAILY WITH MEALS
Qty: 180 TABLET | Refills: 1 | Status: SHIPPED | OUTPATIENT
Start: 2021-06-10 | End: 2022-01-21

## 2021-06-10 RX ORDER — ATORVASTATIN CALCIUM 20 MG/1
20 TABLET, FILM COATED ORAL DAILY
Qty: 90 TABLET | Refills: 1 | Status: SHIPPED | OUTPATIENT
Start: 2021-06-10 | End: 2022-01-21

## 2021-06-10 NOTE — TELEPHONE ENCOUNTER
[facility]. [facility] need all new prescriptions for the following:    Carvedilol  Zolpidem -Ambien  Tramadol acetaminophen  Atorvastatin -Lipitor  Losartan-Cozzar  Protonix  Paroxetine-Paxil

## 2021-07-09 NOTE — PROGRESS NOTES
Assessment & Plan     Skin lesion  Recommends due to location being highly exposed to sun, size, occasional bleeding and not healing that she is seen by dermatology. She had resided in [place]. For many years prior to moving to [place] where she had spent a great deal of time in sun. Recommend that she have a full skin check as well as assessment of right arm lesion. She will contact her insurance for coverage.   - Adult Dermatology Referral; Future    Prediabetes  Has been working on diet will return to clinic lab for this recheck.   - **Basic metabolic panel FUTURE 2mo; Future  - **A1C FUTURE 3mo; Future    Osteoarthritis of spine with radiculopathy, lumbar region  Refill given and new CSA signed today.   - traMADol-acetaminophen (ULTRACET) 37.5-325 MG tablet; Take one tablet by mouth every 4 hours as needed for pain        THERESA Han [facility] DELL Carcamo is a 70 year old female who presents to clinic today for the following health issues:    History of Present Illness       She eats 0-1 servings of fruits and vegetables daily.She consumes 0 sweetened beverage(s) daily.She exercises with enough effort to increase her heart rate 30 to 60 minutes per day.    She is taking medications regularly.     Patient is in for a med check and a skin check.     Has a spot on her right arm. Has been there for about 6 months- was bit by bugs. Is not changing and not getting bigger. Doesn't not hurt unless you push hard on it.  States it will bleed occasionally.     Has history of elevated HGB A1C and GFR recommend rechecking this.       Hyperlipidemia Follow-Up      Are you regularly taking any medication or supplement to lower your cholesterol?   Yes- Lipitor     Are you having muscle aches or other side effects that you think could be caused by your cholesterol lowering medication?  No    Hypertension Follow-up      Do you check your blood pressure regularly outside of the  "clinic? No     Are you following a low salt diet? Yes    Are your blood pressures ever more than 140 on the top number (systolic) OR more   than 90 on the bottom number (diastolic), for example 140/90? No      How many servings of fruits and vegetables do you eat daily?  2-3    On average, how many sweetened beverages do you drink each day (Examples: soda, juice, sweet tea, etc.  Do NOT count diet or artificially sweetened beverages)?   0    How many days per week do you exercise enough to make your heart beat faster? 4    How many minutes a day do you exercise enough to make your heart beat faster? 60 or more    How many days per week do you miss taking your medication? 0        Tramadol one tablet every 4 hours as needed for pain qty 30 stable for osteoarthritis of spine uses more frequently in summer due to increased activity.   CSA renewal is due     Review of Systems   Constitutional, HEENT, cardiovascular, pulmonary, GI, , musculoskeletal, neuro, skin, endocrine and psych systems are negative, except as otherwise noted.      Objective    /60   Pulse 80   Temp 96.8  F (36  C) (Temporal)   Resp 10   Ht 1.64 m (5' 4.57\")   Wt 101.2 kg (223 lb)   SpO2 96%   BMI 37.61 kg/m    Body mass index is 37.61 kg/m .  Physical Exam   GENERAL: healthy, alert and no distress  EYES: Eyes grossly normal to inspection, PERRL and conjunctivae and sclerae normal  HENT: ear canals and TM's normal, nose and mouth without ulcers or lesions  NECK: no adenopathy, no asymmetry, masses, or scars and thyroid normal to palpation  RESP: lungs clear to auscultation - no rales, rhonchi or wheezes  BREAST: normal without masses, tenderness or nipple discharge and no palpable axillary masses or adenopathy  CV: regular rate and rhythm, normal S1 S2, no S3 or S4, no murmur, click or rub, no peripheral edema and peripheral pulses strong  ABDOMEN: soft, nontender, no hepatosplenomegaly, no masses and bowel sounds normal  MS: no gross " musculoskeletal defects noted, no edema  SKIN: appearance of actinic keratosis right forearm is large > 6 mm and states bleeds occasionally.  Multiple areas of solar lentigo  NEURO: Normal strength and tone, mentation intact and speech normal  PSYCH: mentation appears normal, affect normal/bright

## 2021-07-12 ENCOUNTER — TELEPHONE (OUTPATIENT)
Dept: FAMILY MEDICINE | Facility: CLINIC | Age: 71
End: 2021-07-12

## 2021-07-12 ENCOUNTER — OFFICE VISIT (OUTPATIENT)
Dept: FAMILY MEDICINE | Facility: CLINIC | Age: 71
End: 2021-07-12
Payer: MEDICARE

## 2021-07-12 VITALS
RESPIRATION RATE: 10 BRPM | HEIGHT: 65 IN | BODY MASS INDEX: 37.15 KG/M2 | TEMPERATURE: 96.8 F | SYSTOLIC BLOOD PRESSURE: 132 MMHG | OXYGEN SATURATION: 96 % | DIASTOLIC BLOOD PRESSURE: 60 MMHG | WEIGHT: 223 LBS | HEART RATE: 80 BPM

## 2021-07-12 DIAGNOSIS — L98.9 SKIN LESION: Primary | ICD-10-CM

## 2021-07-12 DIAGNOSIS — M47.26 OSTEOARTHRITIS OF SPINE WITH RADICULOPATHY, LUMBAR REGION: ICD-10-CM

## 2021-07-12 DIAGNOSIS — R73.03 PREDIABETES: ICD-10-CM

## 2021-07-12 DIAGNOSIS — Z79.899 CONTROLLED SUBSTANCE AGREEMENT SIGNED: ICD-10-CM

## 2021-07-12 PROBLEM — F41.1 GAD (GENERALIZED ANXIETY DISORDER): Status: ACTIVE | Noted: 2021-07-12

## 2021-07-12 PROBLEM — F41.1 GAD (GENERALIZED ANXIETY DISORDER): Status: RESOLVED | Noted: 2021-07-12 | Resolved: 2021-07-12

## 2021-07-12 PROBLEM — F90.2 ATTENTION DEFICIT HYPERACTIVITY DISORDER (ADHD), COMBINED TYPE: Status: ACTIVE | Noted: 2021-07-12

## 2021-07-12 PROBLEM — F90.2 ATTENTION DEFICIT HYPERACTIVITY DISORDER (ADHD), COMBINED TYPE: Status: RESOLVED | Noted: 2021-07-12 | Resolved: 2021-07-12

## 2021-07-12 PROCEDURE — 99214 OFFICE O/P EST MOD 30 MIN: CPT | Performed by: NURSE PRACTITIONER

## 2021-07-12 ASSESSMENT — MIFFLIN-ST. JEOR: SCORE: 1530.52

## 2021-07-12 ASSESSMENT — PAIN SCALES - GENERAL: PAINLEVEL: NO PAIN (0)

## 2021-07-12 NOTE — TELEPHONE ENCOUNTER
Patient stating she was in contact with her insurance and she is stating her insurance will cover procedure and referral for body scan with Dermatology if it is deemed medically necessary. Sara Bernabe LPN

## 2021-07-12 NOTE — LETTER
Opioid / Opioid Plus Controlled Substance Agreement    This is an agreement between you and your provider about the safe and appropriate use of controlled substance/opioids prescribed by your care team. Controlled substances are medicines that can cause physical and mental dependence (abuse).    There are strict laws about having and using these medicines. We here at [facility] are committing to working with you in your efforts to get better. To support you in this work, we ll help you schedule regular office appointments for medicine refills. If we must cancel or change your appointment for any reason, we ll make sure you have enough medicine to last until your next appointment.     As a Provider, I will:    Listen carefully to your concerns and treat you with respect.     Recommend a treatment plan that I believe is in your best interest. This plan may involve therapies other than opioid pain medication.     Talk with you often about the possible benefits, and the risk of harm of any medicine that we prescribe for you.     Provide a plan on how to taper (discontinue or go off) using this medicine if the decision is made to stop its use.    As a Patient, I understand that opioid(s):     Are a controlled substance prescribed by my care team to help me function or work and manage my condition(s).     Are strong medicines and can cause serious side effects such as:    Drowsiness, which can seriously affect my driving ability    A lower breathing rate, enough to cause death    Harm to my thinking ability     Depression     Abuse of and addiction to this medicine    Need to be taken exactly as prescribed. Combining opioids with certain medicines or chemicals (such as illegal drugs, sedatives, sleeping pills, and benzodiazepines) can be dangerous or even fatal. If I stop opioids suddenly, I may have severe withdrawal symptoms.    Do not work for all types of pain nor for all patients. If they re not helpful, I may  be asked to stop them.    Tramadol 37.5 mg one tablet every 4 hours as needed for pain qty 30/mos.     The risks, benefits and side effects of these medicine(s) were explained to me. I agree that:  1. I will take part in other treatments as advised by my care team. This may be psychiatry or counseling, physical therapy, behavioral therapy, group treatment or a referral to a specialist.     2. I will keep all my appointments. I understand that this is part of the monitoring of opioids. My care team may require an office visit for EVERY opioid/controlled substance refill. If I miss appointments or don t follow instructions, my care team may stop my medicine.    3. I will take my medicines as prescribed. I will not change the dose or schedule unless my care team tells me to. There will be no refills if I run out early.     4. I may be asked to come to the clinic and complete a urine drug test or complete a pill count at any time. If I don t give a urine sample or participate in a pill count, the care team may stop my medicine.    5. I will only receive prescriptions from this clinic for chronic pain. If I am treated by another provider for acute pain issues, I will tell them that I am taking opioid pain medication for chronic pain and that I have a treatment agreement with this provider. I will inform my [facility] care team within one business day if I am given a prescription for any pain medication by another healthcare provider. My [facility] care team can contact other providers and pharmacists about my use of any medicines.    6. It is up to me to make sure that I don t run out of my medicines on weekends or holidays. If my care team is willing to refill my opioid prescription without a visit, I must request refills only during office hours. Refills may take up to 3 business days to process. I will use one pharmacy to fill all my opioid and other controlled substance prescriptions. I will notify the  clinic about any changes to my insurance or medication availability.    7. I am responsible for my prescriptions. If the medicine/prescription is lost, stolen or destroyed, it will not be replaced. I also agree not to share controlled substance medicines with anyone.    8. I am aware I should not use any illegal or recreational drugs. I agree not to drink alcohol unless my care team says I can.       9. If I enroll in the [place] [facility] Cannabis program, I will tell my care team prior to my next refill.     10. I will tell my care team right away if I become pregnant, have a new medical problem treated outside of my regular clinic, or have a change in my medications.    11. I understand that this medicine can affect my thinking, judgment and reaction time. Alcohol and drugs affect the brain and body, which can affect the safety of my driving. Being under the influence of alcohol or drugs can affect my decision-making, behaviors, personal safety, and the safety of others. Driving while impaired (DWI) can occur if a person is driving, operating, or in physical control of a car, motorcycle, boat, snowmobile, ATV, motorbike, off-road vehicle, or any other motor vehicle ([place] Statute 169A.20). I understand the risk if I choose to drive or operate any vehicle or machinery.    I understand that if I do not follow any of the conditions above, my prescriptions or treatment may be stopped or changed.          Opioids  What You Need to Know    What are opioids?   Opioids are pain medicines that must be prescribed by a doctor. They are also known as narcotics.     Examples are:   1. morphine (MS Contin, Erica)  2. oxycodone (Oxycontin)  3. oxycodone and acetaminophen (Percocet)  4. hydrocodone and acetaminophen (Vicodin, Norco)   5. fentanyl patch (Duragesic)   6. hydromorphone (Dilaudid)   7. methadone  8. codeine (Tylenol #3)     What do opioids do well?   Opioids are best for severe short-term pain such as after a surgery  or injury. They may work well for cancer pain. They may help some people with long-lasting (chronic) pain.     What do opioids NOT do well?   Opioids never get rid of pain entirely, and they don t work well for most patients with chronic pain. Opioids don t reduce swelling, one of the causes of pain.                                    Other ways to manage chronic pain and improve function include:       Treat the health problem that may be causing pain    Anti-inflammation medicines, which reduce swelling and tenderness, such as ibuprofen (Advil, Motrin) or naproxen (Aleve)    Acetaminophen (Tylenol)    Antidepressants and anti-seizure medicines, especially for nerve pain    Topical treatments such as patches or creams    Injections or nerve blocks    Chiropractic or osteopathic treatment    Acupuncture, massage, deep breathing, meditation, visual imagery, aromatherapy    Use heat or ice at the pain site    Physical therapy     Exercise    Stop smoking    Take part in therapy       Risks and side effects     Talk to your doctor before you start or decide to keep taking opioids. Possible side effects include:      Lowering your breathing rate enough to cause death    Overdose, including death, especially if taking higher than prescribed doses    Worse depression symptoms; less pleasure in things you usually enjoy    Feeling tired or sluggish    Slower thoughts or cloudy thinking    Being more sensitive to pain over time; pain is harder to control    Trouble sleeping or restless sleep    Changes in hormone levels (for example, less testosterone)    Changes in sex drive or ability to have sex    Constipation    Unsafe driving    Itching and sweating    Dizziness    Nausea, throwing up and dry mouth    What else should I know about opioids?    Opioids may lead to dependence, tolerance, or addiction.      Dependence means that if you stop or reduce the medicine too quickly, you will have withdrawal symptoms. These  include loose poop (diarrhea), jitters, flu-like symptoms, nervousness and tremors. Dependence is not the same as addiction.                       Tolerance means needing higher doses over time to get the same effect. This may increase the chance of serious side effects.      Addiction is when people improperly use a substance that harms their body, their mind or their relations with others. Use of opiates can cause a relapse of addiction if you have a history of drug or alcohol abuse.      People who have used opioids for a long time may have a lower quality of life, worse depression, higher levels of pain and more visits to doctors.    You can overdose on opioids. Take these steps to lower your risk of overdose:    1. Recognize the signs:  Signs of overdose include decrease or loss of consciousness (blackout), slowed breathing, trouble waking up and blue lips. If someone is worried about overdose, they should call 911.    2. Talk to your doctor about Narcan (naloxone).   If you are at risk for overdose, you may be given a prescription for Narcan. This medicine very quickly reverses the effects of opioids.   If you overdose, a friend or family member can give you Narcan while waiting for the ambulance. They need to know the signs of overdose and how to give Narcan.     3. Don't use alcohol or street drugs.   Taking them with opioids can cause death.    4. Do not take any of these medicines unless your doctor says it s OK. Taking these with opioids can cause death:    Benzodiazepines, such as lorazepam (Ativan), alprazolam (Xanax) or diazepam (Valium)    Muscle relaxers, such as cyclobenzaprine (Flexeril)    Sleeping pills like zolpidem (Ambien)     Other opioids      How to keep you and other people safe while taking opioids:    1. Never share your opioids with others.  Opioid medicines are regulated by the Drug Enforcement Agency (MAHESH). Selling or sharing medications is a criminal act.    2. Be sure to store  opioids in a secure place, locked up if possible. Young children can easily swallow them and overdose.    3. When you are traveling with your medicines, keep them in the original bottles. If you use a pill box, be sure you also carry a copy of your medicine list from your clinic or pharmacy.    4. Safe disposal of opioids    Most pharmacies have places to get rid of medicine, called disposal kiosks. Medicine disposal options are also available in every [facility]. Search your county and  medication disposal  to find more options. You can find more details at:  https://www.pca.[facility].[place]./living-green/managing-unwanted-medications     I agree that my provider, clinic care team, and pharmacy may work with any city, state or federal law enforcement agency that investigates the misuse, sale, or other diversion of my controlled medicine. I will allow my provider to discuss my care with, or share a copy of, this agreement with any other treating provider, pharmacy or emergency room where I receive care.    I have read this agreement and have asked questions about anything I did not understand.    _______________________________________________________  Patient Signature - Shelly Carcamo _____________________                   Date     _______________________________________________________  Provider Signature - THERESA Han CNP   _____________________                   Date     _______________________________________________________  Witness Signature (required if provider not present while patient signing)   _____________________                   Date

## 2021-07-30 ENCOUNTER — LAB (OUTPATIENT)
Dept: LAB | Facility: CLINIC | Age: 71
End: 2021-07-30
Payer: MEDICARE

## 2021-07-30 DIAGNOSIS — R73.03 PREDIABETES: ICD-10-CM

## 2021-07-30 LAB
ANION GAP SERPL CALCULATED.3IONS-SCNC: 5 MMOL/L (ref 3–14)
BUN SERPL-MCNC: 20 MG/DL (ref 7–30)
CALCIUM SERPL-MCNC: 8.9 MG/DL (ref 8.5–10.1)
CHLORIDE BLD-SCNC: 109 MMOL/L (ref 94–109)
CO2 SERPL-SCNC: 26 MMOL/L (ref 20–32)
CREAT SERPL-MCNC: 1.11 MG/DL (ref 0.52–1.04)
GFR SERPL CREATININE-BSD FRML MDRD: 51 ML/MIN/1.73M2
GLUCOSE BLD-MCNC: 108 MG/DL (ref 70–99)
HBA1C MFR BLD: 5.9 % (ref 0–5.6)
POTASSIUM BLD-SCNC: 4.2 MMOL/L (ref 3.4–5.3)
SODIUM SERPL-SCNC: 140 MMOL/L (ref 133–144)

## 2021-07-30 PROCEDURE — 80048 BASIC METABOLIC PNL TOTAL CA: CPT

## 2021-07-30 PROCEDURE — 36415 COLL VENOUS BLD VENIPUNCTURE: CPT

## 2021-07-30 PROCEDURE — 83036 HEMOGLOBIN GLYCOSYLATED A1C: CPT

## 2021-08-04 ENCOUNTER — TELEPHONE (OUTPATIENT)
Dept: FAMILY MEDICINE | Facility: CLINIC | Age: 71
End: 2021-08-04

## 2021-08-04 NOTE — TELEPHONE ENCOUNTER
Spoke with patient, she was driving and requested a call back.    Upon calling back, phone was answered by identified voicemail.    Detailed message left for patient.    Delmy BRINKO/

## 2021-08-04 NOTE — TELEPHONE ENCOUNTER
Spoke with pt, she states she does not take any of the medications listed below and is concerned that her current Rx's or supplements may be contributing to this kidney function result.     Please review medication list, pt states it is up to date with her supplements as well.       _ __ __ __ __ __ __ __ __ __ _      Dear Lakeshia,      Here are your recent results. Stable metabolic panel. Your kidney function is still low please avoid Nsaids such as ibuprofen, aleve, advil, motrin etc... HGB A1C is stable no change.      Please let us know if you have any questions or concerns.     Regards,  THERESA Han CNP

## 2021-08-04 NOTE — TELEPHONE ENCOUNTER
I have reviewed medications that she is currently taking and do not see any that would need to be adjusted according to her renal function. She has had mild elevation of hgb a1c and glucose consistently as well these can contribute to renal function as well. Working on monitoring lifestyle can be helpful, low sugar, exercise, staying hydrated.     We can recheck this in 6 mos.     Hope this was helpful.     Thank you  Bridget Holder CNP

## 2021-08-13 DIAGNOSIS — M47.26 OSTEOARTHRITIS OF SPINE WITH RADICULOPATHY, LUMBAR REGION: ICD-10-CM

## 2021-08-16 NOTE — TELEPHONE ENCOUNTER
Pending Prescriptions:                       Disp   Refills    traMADol-acetaminophen (ULTRACET) 37.5-325*30 tab*0        Sig: TAKE 1 TABLET EVERY 4 HOURS AS NEEDED FOR PAIN      Routing refill request to provider for review/approval because:  Drug not on the [facility] refill protocol     Gia Nunez RN on 8/16/2021 at 12:03 PM

## 2021-08-30 DIAGNOSIS — M47.26 OSTEOARTHRITIS OF SPINE WITH RADICULOPATHY, LUMBAR REGION: ICD-10-CM

## 2021-11-16 ENCOUNTER — MYC MEDICAL ADVICE (OUTPATIENT)
Dept: FAMILY MEDICINE | Facility: CLINIC | Age: 71
End: 2021-11-16
Payer: MEDICARE

## 2021-11-16 ENCOUNTER — TELEPHONE (OUTPATIENT)
Dept: FAMILY MEDICINE | Facility: CLINIC | Age: 71
End: 2021-11-16
Payer: MEDICARE

## 2021-11-16 DIAGNOSIS — M47.26 OSTEOARTHRITIS OF SPINE WITH RADICULOPATHY, LUMBAR REGION: ICD-10-CM

## 2021-11-16 NOTE — TELEPHONE ENCOUNTER
Patient wonders if she should continue to use aspirin at bedtime, she has heard prolong use is bad for your, please advise    [phone] ok to leave detailed message

## 2021-11-16 NOTE — TELEPHONE ENCOUNTER
Pending Prescriptions:                       Disp   Refills    traMADol-acetaminophen (ULTRACET) 37.5-325*30 tab*0        Sig: TAKE 1 TABLET EVERY 4 HOURS AS NEEDED FOR PAIN    Routing refill request to provider for review/approval because:  Drug not on the [facility] refill protocol   traMADol-acetaminophen (ULTRACET) 37.5-325 MG tablet 30 tablet 0 9/29/2021  No   Sig: TAKE ONE TABLET BY MOUTH EVERY 4 HOURS   Sent to pharmacy as: traMADol-Acetaminophen 37.5-325 MG Oral Tablet (ULTRACET)   Class: E-Prescribe   Order: [id number]   E-Prescribing Status: Receipt confirmed by pharmacy (9/29/2021 10:42 AM CDT)

## 2021-11-16 NOTE — TELEPHONE ENCOUNTER
Refill sent for tramadol  reviewed.  Patient will need office visit in December for further refills for her 6-month follow-up for pain control    Thank you  Bridget Holder CNP

## 2022-01-21 ENCOUNTER — OFFICE VISIT (OUTPATIENT)
Dept: FAMILY MEDICINE | Facility: CLINIC | Age: 72
End: 2022-01-21
Payer: MEDICARE

## 2022-01-21 VITALS
TEMPERATURE: 96.1 F | HEART RATE: 76 BPM | OXYGEN SATURATION: 96 % | RESPIRATION RATE: 20 BRPM | WEIGHT: 225.8 LBS | BODY MASS INDEX: 38.55 KG/M2 | SYSTOLIC BLOOD PRESSURE: 128 MMHG | HEIGHT: 64 IN | DIASTOLIC BLOOD PRESSURE: 80 MMHG

## 2022-01-21 DIAGNOSIS — K21.9 CHRONIC GERD: ICD-10-CM

## 2022-01-21 DIAGNOSIS — I10 ESSENTIAL HYPERTENSION: ICD-10-CM

## 2022-01-21 DIAGNOSIS — F51.04 PSYCHOPHYSIOLOGICAL INSOMNIA: ICD-10-CM

## 2022-01-21 DIAGNOSIS — R23.2 HOT FLASHES: ICD-10-CM

## 2022-01-21 DIAGNOSIS — E78.2 MIXED HYPERLIPIDEMIA: ICD-10-CM

## 2022-01-21 DIAGNOSIS — Z11.59 ENCOUNTER FOR HEPATITIS C SCREENING TEST FOR LOW RISK PATIENT: ICD-10-CM

## 2022-01-21 DIAGNOSIS — Z00.00 ENCOUNTER FOR MEDICARE ANNUAL WELLNESS EXAM: Primary | ICD-10-CM

## 2022-01-21 DIAGNOSIS — R73.09 ELEVATED HEMOGLOBIN A1C: ICD-10-CM

## 2022-01-21 LAB
ANION GAP SERPL CALCULATED.3IONS-SCNC: 5 MMOL/L (ref 3–14)
BUN SERPL-MCNC: 26 MG/DL (ref 7–30)
CALCIUM SERPL-MCNC: 9.3 MG/DL (ref 8.5–10.1)
CHLORIDE BLD-SCNC: 107 MMOL/L (ref 94–109)
CHOLEST SERPL-MCNC: 189 MG/DL
CO2 SERPL-SCNC: 26 MMOL/L (ref 20–32)
CREAT SERPL-MCNC: 1.13 MG/DL (ref 0.52–1.04)
FASTING STATUS PATIENT QL REPORTED: YES
GFR SERPL CREATININE-BSD FRML MDRD: 52 ML/MIN/1.73M2
GLUCOSE BLD-MCNC: 121 MG/DL (ref 70–99)
HBA1C MFR BLD: 6.2 % (ref 0–5.6)
HCV AB SERPL QL IA: NONREACTIVE
HDLC SERPL-MCNC: 90 MG/DL
LDLC SERPL CALC-MCNC: 68 MG/DL
NONHDLC SERPL-MCNC: 99 MG/DL
POTASSIUM BLD-SCNC: 4.5 MMOL/L (ref 3.4–5.3)
SODIUM SERPL-SCNC: 138 MMOL/L (ref 133–144)
TRIGL SERPL-MCNC: 157 MG/DL
TSH SERPL DL<=0.005 MIU/L-ACNC: 3.98 MU/L (ref 0.4–4)

## 2022-01-21 PROCEDURE — 83036 HEMOGLOBIN GLYCOSYLATED A1C: CPT | Performed by: NURSE PRACTITIONER

## 2022-01-21 PROCEDURE — G0439 PPPS, SUBSEQ VISIT: HCPCS | Performed by: NURSE PRACTITIONER

## 2022-01-21 PROCEDURE — 84443 ASSAY THYROID STIM HORMONE: CPT | Performed by: NURSE PRACTITIONER

## 2022-01-21 PROCEDURE — 86803 HEPATITIS C AB TEST: CPT | Performed by: NURSE PRACTITIONER

## 2022-01-21 PROCEDURE — 80048 BASIC METABOLIC PNL TOTAL CA: CPT | Performed by: NURSE PRACTITIONER

## 2022-01-21 PROCEDURE — 80061 LIPID PANEL: CPT | Performed by: NURSE PRACTITIONER

## 2022-01-21 PROCEDURE — 36415 COLL VENOUS BLD VENIPUNCTURE: CPT | Performed by: NURSE PRACTITIONER

## 2022-01-21 RX ORDER — PAROXETINE 10 MG/1
10 TABLET, FILM COATED ORAL EVERY MORNING
Qty: 90 TABLET | Refills: 1 | Status: SHIPPED | OUTPATIENT
Start: 2022-01-21 | End: 2022-03-10

## 2022-01-21 RX ORDER — LOSARTAN POTASSIUM 50 MG/1
TABLET ORAL
Qty: 180 TABLET | Refills: 1 | Status: SHIPPED | OUTPATIENT
Start: 2022-01-21 | End: 2022-03-10

## 2022-01-21 RX ORDER — CARVEDILOL 6.25 MG/1
6.25 TABLET ORAL 2 TIMES DAILY WITH MEALS
Qty: 180 TABLET | Refills: 1 | Status: SHIPPED | OUTPATIENT
Start: 2022-01-21 | End: 2022-03-10

## 2022-01-21 RX ORDER — ATORVASTATIN CALCIUM 20 MG/1
20 TABLET, FILM COATED ORAL DAILY
Qty: 90 TABLET | Refills: 1 | Status: SHIPPED | OUTPATIENT
Start: 2022-01-21 | End: 2022-03-10

## 2022-01-21 RX ORDER — PANTOPRAZOLE SODIUM 40 MG/1
40 TABLET, DELAYED RELEASE ORAL DAILY
Qty: 90 TABLET | Refills: 1 | Status: SHIPPED | OUTPATIENT
Start: 2022-01-21 | End: 2022-03-10

## 2022-01-21 ASSESSMENT — ENCOUNTER SYMPTOMS
BREAST MASS: 0
CONSTIPATION: 0
CHILLS: 0
EYE PAIN: 0
MYALGIAS: 0
PALPITATIONS: 0
WEAKNESS: 0
HEARTBURN: 0
PARESTHESIAS: 0
HEADACHES: 0
HEMATURIA: 0
ARTHRALGIAS: 0
DIARRHEA: 0
ABDOMINAL PAIN: 0
NAUSEA: 0
SHORTNESS OF BREATH: 0
COUGH: 0
DIZZINESS: 0
NERVOUS/ANXIOUS: 0
SORE THROAT: 0
HEMATOCHEZIA: 0
FEVER: 0
JOINT SWELLING: 0

## 2022-01-21 ASSESSMENT — PAIN SCALES - GENERAL: PAINLEVEL: NO PAIN (0)

## 2022-01-21 ASSESSMENT — MIFFLIN-ST. JEOR: SCORE: 1529.22

## 2022-01-21 ASSESSMENT — ACTIVITIES OF DAILY LIVING (ADL): CURRENT_FUNCTION: NO ASSISTANCE NEEDED

## 2022-01-21 NOTE — NURSING NOTE
Health Maintenance Due   Topic Date Due     ANNUAL REVIEW OF HM ORDERS  Never done     HEPATITIS C SCREENING  Never done     FALL RISK ASSESSMENT  01/08/2022     MEDICARE ANNUAL WELLNESS VISIT  01/08/2022     Wendi WARREN LPN

## 2022-01-21 NOTE — PATIENT INSTRUCTIONS
Patient Education   Personalized Prevention Plan  You are due for the preventive services outlined below.  Your care team is available to assist you in scheduling these services.  If you have already completed any of these items, please share that information with your care team to update in your medical record.  Health Maintenance Due   Topic Date Due     ANNUAL REVIEW OF HM ORDERS  Never done     Hepatitis C Screening  Never done     FALL RISK ASSESSMENT  01/08/2022

## 2022-01-21 NOTE — PROGRESS NOTES
"SUBJECTIVE:   Shelly Carcamo is a 71 year old female who presents for Preventive Visit.      Patient has been advised of split billing requirements and indicates understanding: Yes  Are you in the first 12 months of your Medicare coverage?  No    Healthy Habits:     In general, how would you rate your overall health?  Good    Frequency of exercise:  2-3 days/week    Duration of exercise:  15-30 minutes    Do you usually eat at least 4 servings of fruit and vegetables a day, include whole grains    & fiber and avoid regularly eating high fat or \"junk\" foods?  No    Taking medications regularly:  Yes    Medication side effects:  None    Ability to successfully perform activities of daily living:  No assistance needed    Home Safety:  No safety concerns identified    Hearing Impairment:  Difficulty understanding soft or whispered speech    In the past 6 months, have you been bothered by leaking of urine?  No    In general, how would you rate your overall mental or emotional health?  Good      PHQ-2 Total Score: 0    Additional concerns today:  No    Do you feel safe in your environment? Yes    Have you ever done Advance Care Planning? (For example, a Health Directive, POLST, or a discussion with a medical provider or your loved ones about your wishes): Yes, patient states has an Advance Care Planning document and will bring a copy to the clinic.    Recent hearing testing normal.      Fall risk  Fallen 2 or more times in the past year?: No  Any fall with injury in the past year?: No    Cognitive Screening   1) Repeat 3 items (Leader, Season, Table)    2) Clock draw: NORMAL  3) 3 item recall: Recalls 3 objects  Results: 3 items recalled: COGNITIVE IMPAIRMENT LESS LIKELY    Mini-CogTM Copyright SHEFALI Anderson. Licensed by the author for use in [facility]; reprinted with permission (judd@.[facility]). All rights reserved.      Do you have sleep apnea, excessive snoring or daytime drowsiness?: no    Reviewed and updated " as needed this visit by clinical staff   Allergies  Meds             Reviewed and updated as needed this visit by Provider               Social History     Tobacco Use     Smoking status: Never Smoker     Smokeless tobacco: Never Used   Substance Use Topics     Alcohol use: Yes     Comment: socially on occasion     If you drink alcohol do you typically have >3 drinks per day or >7 drinks per week? No    Alcohol Use 1/21/2022   Prescreen: >3 drinks/day or >7 drinks/week? No               Current providers sharing in care for this patient include:   Patient Care Team:  Bridget Holder APRN CNP as PCP - General (Nurse Practitioner - Family)  Melchor James MD as Assigned Pulmonology Provider  Bridget Holder APRN CNP as Assigned PCP  Cristian Guillen MD as Assigned Surgical Provider  Daniel Puentes MD as MD (Dermapathology)  Bridget Holder APRN CNP as Referring Physician (Family Medicine)    The following health maintenance items are reviewed in Epic and correct as of today:  Health Maintenance Due   Topic Date Due     ANNUAL REVIEW OF HM ORDERS  Never done     HEPATITIS C SCREENING  Never done     FALL RISK ASSESSMENT  01/08/2022     MEDICARE ANNUAL WELLNESS VISIT  01/08/2022     Labs reviewed in EPIC  BP Readings from Last 3 Encounters:   01/21/22 128/80   07/12/21 132/60   03/01/21 122/72    Wt Readings from Last 3 Encounters:   01/21/22 102.4 kg (225 lb 12.8 oz)   07/12/21 101.2 kg (223 lb)   03/01/21 102.3 kg (225 lb 8 oz)                  Patient Active Problem List   Diagnosis     High cholesterol     HTN (hypertension)     Vitamin B12 deficiency     Obesity (BMI 35.0-39.9) with comorbidity (H)     Vitamin D deficiency     DDD (degenerative disc disease), lumbar     Osteoarthritis of spine with radiculopathy, lumbar region     Psychophysiological insomnia     Gastroesophageal reflux disease with esophagitis     Controlled substance agreement signed     Elevated hemoglobin  A1c     Past Surgical History:   Procedure Laterality Date     BIOPSY  2018    Hysterectomy     CATARACT IOL, RT/LT Bilateral      CATARACT IOL, RT/LT Bilateral 2015     COLONOSCOPY  2016    Biopsy negitive     ENT SURGERY  1956     HYSTERECTOMY       HYSTERECTOMY, PAP NO LONGER INDICATED       ORTHOPEDIC SURGERY  2016    both wrist Carpal tunnel surgery       Social History     Tobacco Use     Smoking status: Never Smoker     Smokeless tobacco: Never Used   Substance Use Topics     Alcohol use: Yes     Comment: socially on occasion     Family History   Problem Relation Age of Onset     Pancreatic Cancer Mother      Hypertension Mother      Obesity Mother      ALS Father      Coronary Artery Disease Father         Valve replacement. Aortic valve calcification     Hyperlipidemia Father      Obesity Father      Obesity Maternal Grandmother      Glaucoma Maternal Grandfather      Atrial fibrillation Brother      Glaucoma Other      Coronary Artery Disease Brother         AFIB     Hypertension Brother      Hyperlipidemia Brother      Obesity Brother      Macular Degeneration No family hx of          Current Outpatient Medications   Medication Sig Dispense Refill     Ascorbic Acid (VITAMIN C ER PO)        aspirin 81 MG EC tablet Take 81 mg by mouth daily       atorvastatin (LIPITOR) 20 MG tablet Take 1 tablet (20 mg) by mouth daily 90 tablet 1     carvedilol (COREG) 6.25 MG tablet Take 1 tablet (6.25 mg) by mouth 2 times daily (with meals) 180 tablet 1     Coenzyme Q10 (COQ10 PO)        COVID-19 mRNA vacc, Moderna, 100 MCG/0.5ML injection        losartan (COZAAR) 50 MG tablet TAKE TWO TABLETS ( 100 MG) BY MOUTH ONCE DAILY 180 tablet 1     pantoprazole (PROTONIX) 40 MG EC tablet Take 1 tablet (40 mg) by mouth daily 90 tablet 1     PARoxetine (PAXIL) 10 MG tablet Take 1 tablet (10 mg) by mouth every morning 90 tablet 1     traMADol-acetaminophen (ULTRACET) 37.5-325 MG tablet TAKE 1 TABLET EVERY 4 HOURS AS NEEDED FOR PAIN  30 tablet 0     TURMERIC PO        Vitamin D, Cholecalciferol, 25 MCG (1000 UT) CAPS        Zinc Acetate, Oral, (ZINC ACETATE PO)        zolpidem (AMBIEN) 5 MG tablet TAKE ONE TABLET BY MOUTH NIGHTLY AS NEEDED FOR SLEEP 30 tablet 3     No Known Allergies  Recent Labs   Lab Test 07/30/21  0820 01/06/21  0749 07/08/20  0730 11/20/19  0000 07/09/19  0000 03/06/19  0000   A1C 5.9* 5.9* 5.8* 5.9* 6.0* 5.9*   LDL  --   --  65 66 66 67   HDL  --   --  89 81 79 85   TRIG  --   --  106 124 93 186*   ALT  --   --  27 18 15 23   CR 1.11* 1.06* 1.22* 1.03* 1.01* 0.93   GFRESTIMATED 51* 53* 45* 56* 58* 64   GFRESTBLACK  --  62 52* 65 67 74   POTASSIUM 4.2 4.3 4.3 4.8 4.8 4.7   TSH  --   --   --   --  1.32 2.06              Review of Systems   Constitutional: Negative for chills and fever.   HENT: Negative for congestion, ear pain, hearing loss and sore throat.    Eyes: Positive for visual disturbance. Negative for pain.   Respiratory: Negative for cough and shortness of breath.    Cardiovascular: Negative for chest pain, palpitations and peripheral edema.   Gastrointestinal: Negative for abdominal pain, constipation, diarrhea, heartburn, hematochezia and nausea.   Breasts:  Negative for tenderness, breast mass and discharge.   Genitourinary: Negative for genital sores, hematuria, pelvic pain, urgency, vaginal bleeding and vaginal discharge.   Musculoskeletal: Negative for arthralgias, joint swelling and myalgias.   Skin: Negative for rash.   Neurological: Negative for dizziness, weakness, headaches and paresthesias.   Psychiatric/Behavioral: Negative for mood changes. The patient is not nervous/anxious.      Constitutional, HEENT, cardiovascular, pulmonary, GI, , musculoskeletal, neuro, skin, endocrine and psych systems are negative, except as otherwise noted.    OBJECTIVE:   /80 (BP Location: Right arm, Patient Position: Chair, Cuff Size: Adult Large)   Pulse 76   Temp (!) 96.1  F (35.6  C) (Temporal)   Resp 20   Ht  "1.626 m (5' 4\")   Wt 102.4 kg (225 lb 12.8 oz)   SpO2 96%   BMI 38.76 kg/m   Estimated body mass index is 38.76 kg/m  as calculated from the following:    Height as of this encounter: 1.626 m (5' 4\").    Weight as of this encounter: 102.4 kg (225 lb 12.8 oz).  Physical Exam  GENERAL: healthy, alert and no distress  EYES: Eyes grossly normal to inspection, PERRL and conjunctivae and sclerae normal  HENT: ear canals and TM's normal, nose and mouth without ulcers or lesions  NECK: no adenopathy, no asymmetry, masses, or scars and thyroid normal to palpation  RESP: lungs clear to auscultation - no rales, rhonchi or wheezes  BREAST: normal without masses, tenderness or nipple discharge and no palpable axillary masses or adenopathy  CV: regular rate and rhythm, normal S1 S2, no S3 or S4, no murmur, click or rub, no peripheral edema and peripheral pulses strong  ABDOMEN: soft, nontender, no hepatosplenomegaly, no masses and bowel sounds normal  MS: no gross musculoskeletal defects noted, no edema  SKIN: no suspicious lesions or rashes  NEURO: Normal strength and tone, mentation intact and speech normal  PSYCH: mentation appears normal, affect normal/bright    Diagnostic Test Results:  Labs reviewed in Epic    ASSESSMENT / PLAN:       ICD-10-CM    1. Encounter for Medicare annual wellness exam  Z00.00 Hepatitis C antibody     TSH with free T4 reflex     Adult Eye Referral   2. Mixed hyperlipidemia  E78.2 atorvastatin (LIPITOR) 20 MG tablet     Basic metabolic panel  (Ca, Cl, CO2, Creat, Gluc, K, Na, BUN)     Lipid panel reflex to direct LDL Fasting   3. Essential hypertension  I10 Basic metabolic panel  (Ca, Cl, CO2, Creat, Gluc, K, Na, BUN)     carvedilol (COREG) 6.25 MG tablet     losartan (COZAAR) 50 MG tablet   4. Chronic GERD  K21.9 pantoprazole (PROTONIX) 40 MG EC tablet   5. Hot flashes  R23.2 Basic metabolic panel  (Ca, Cl, CO2, Creat, Gluc, K, Na, BUN)     PARoxetine (PAXIL) 10 MG tablet   6. Encounter for " "hepatitis C screening test for low risk patient  Z11.59 Hepatitis C antibody   7. Elevated hemoglobin A1c  R73.09 Hemoglobin A1c   8. Psychophysiological insomnia  F51.04            COUNSELING:  Reviewed preventive health counseling, as reflected in patient instructions       Regular exercise       Healthy diet/nutrition       Osteoporosis prevention/bone health       Hepatitis C screening    Estimated body mass index is 38.76 kg/m  as calculated from the following:    Height as of this encounter: 1.626 m (5' 4\").    Weight as of this encounter: 102.4 kg (225 lb 12.8 oz).    Weight management plan: Discussed healthy diet and exercise guidelines    She reports that she has never smoked. She has never used smokeless tobacco.      Appropriate preventive services were discussed with this patient, including applicable screening as appropriate for cardiovascular disease, diabetes, osteopenia/osteoporosis, and glaucoma.  As appropriate for age/gender, discussed screening for colorectal cancer, prostate cancer, breast cancer, and cervical cancer. Checklist reviewing preventive services available has been given to the patient.    Reviewed patients plan of care and provided an AVS. The Basic Care Plan (routine screening as documented in Health Maintenance) for Shelly meets the Care Plan requirement. This Care Plan has been established and reviewed with the Patient.    Counseling Resources:  ATP IV Guidelines  Pooled Cohorts Equation Calculator  Breast Cancer Risk Calculator  Breast Cancer: Medication to Reduce Risk  FRAX Risk Assessment  ICSI Preventive Guidelines  Dietary Guidelines for Americans, 2010  USDA's MyPlate  ASA Prophylaxis  Lung CA Screening    THERESA [facility]    Identified Health Risks:  "

## 2022-03-10 ENCOUNTER — VIRTUAL VISIT (OUTPATIENT)
Dept: FAMILY MEDICINE | Facility: CLINIC | Age: 72
End: 2022-03-10
Payer: MEDICARE

## 2022-03-10 DIAGNOSIS — M47.26 OSTEOARTHRITIS OF SPINE WITH RADICULOPATHY, LUMBAR REGION: ICD-10-CM

## 2022-03-10 DIAGNOSIS — G47.00 INSOMNIA, UNSPECIFIED TYPE: ICD-10-CM

## 2022-03-10 DIAGNOSIS — I10 ESSENTIAL HYPERTENSION: ICD-10-CM

## 2022-03-10 DIAGNOSIS — K21.9 CHRONIC GERD: ICD-10-CM

## 2022-03-10 DIAGNOSIS — E66.01 MORBID OBESITY (H): ICD-10-CM

## 2022-03-10 DIAGNOSIS — R23.2 HOT FLASHES: ICD-10-CM

## 2022-03-10 DIAGNOSIS — E78.2 MIXED HYPERLIPIDEMIA: ICD-10-CM

## 2022-03-10 PROCEDURE — 99214 OFFICE O/P EST MOD 30 MIN: CPT | Mod: 95 | Performed by: PHYSICIAN ASSISTANT

## 2022-03-10 RX ORDER — LOSARTAN POTASSIUM 50 MG/1
TABLET ORAL
Qty: 180 TABLET | Refills: 1 | Status: SHIPPED | OUTPATIENT
Start: 2022-03-10 | End: 2022-09-01

## 2022-03-10 RX ORDER — ATORVASTATIN CALCIUM 20 MG/1
20 TABLET, FILM COATED ORAL DAILY
Qty: 90 TABLET | Refills: 1 | Status: SHIPPED | OUTPATIENT
Start: 2022-03-10 | End: 2022-12-15

## 2022-03-10 RX ORDER — ZOLPIDEM TARTRATE 5 MG/1
TABLET ORAL
Qty: 30 TABLET | Refills: 5 | Status: SHIPPED | OUTPATIENT
Start: 2022-03-10 | End: 2022-09-15

## 2022-03-10 RX ORDER — PAROXETINE 10 MG/1
10 TABLET, FILM COATED ORAL EVERY MORNING
Qty: 90 TABLET | Refills: 1 | Status: SHIPPED | OUTPATIENT
Start: 2022-03-10 | End: 2022-12-15

## 2022-03-10 RX ORDER — PANTOPRAZOLE SODIUM 40 MG/1
40 TABLET, DELAYED RELEASE ORAL DAILY
Qty: 90 TABLET | Refills: 1 | Status: SHIPPED | OUTPATIENT
Start: 2022-03-10 | End: 2022-12-15

## 2022-03-10 RX ORDER — CARVEDILOL 6.25 MG/1
6.25 TABLET ORAL 2 TIMES DAILY WITH MEALS
Qty: 180 TABLET | Refills: 1 | Status: SHIPPED | OUTPATIENT
Start: 2022-03-10 | End: 2022-12-15

## 2022-03-10 NOTE — PROGRESS NOTES
Lakeshia is a 71 year old who is being evaluated via a billable video visit.      How would you like to obtain your AVS? MyChart  If the video visit is dropped, the invitation should be resent by: Text to cell phone: [phone]  Will anyone else be joining your video visit? No      Video Start Time: 10:45 AM    Lucía Asher is a 71 year old who presents for the following health issues    History of Present Illness       Reason for visit:  Requred visit for prescription from [facility]. Due to new primary care .    She eats 0-1 servings of fruits and vegetables daily.She consumes 0 sweetened beverage(s) daily.She exercises with enough effort to increase her heart rate 20 to 29 minutes per day.  She exercises with enough effort to increase her heart rate 3 or less days per week.   She is taking medications regularly.    [facility] care    Patient was previously seeing Bridgte Holder who has since left primary care. She states that she and her  moved here from [place] in 2019. They are [place] natives but with the changes occurring there and family in [place] they decided to move.     Patient reports she overall has been feeling really well. She had a physical in January. Labs stable and blood pressure in good range. She denies any headaches, chest pain, shortness of breath or lower extremity edema.     She reports moods are very stable with Paxil 10 mg each morning. She does use Ambien 5 mg nightly to sleep.     Has a prescription for Tramadol. Uses this very rarely if she over-does it. She states typically with shoveling snow. Takes 2 doses and this seems to resolve things. Last refill was 2 months ago.     Review of Systems   Constitutional, HEENT, cardiovascular, pulmonary, GI, , musculoskeletal, neuro, skin, endocrine and psych systems are negative, except as otherwise noted.      Objective           Vitals:  No vitals were obtained today due to virtual visit.    Physical Exam   GENERAL:  Healthy, alert and no distress  EYES: Eyes grossly normal to inspection.  No discharge or erythema, or obvious scleral/conjunctival abnormalities.  RESP: No audible wheeze, cough, or visible cyanosis.  No visible retractions or increased work of breathing.    SKIN: Visible skin clear. No significant rash, abnormal pigmentation or lesions.  NEURO: Cranial nerves grossly intact.  Mentation and speech appropriate for age.  PSYCH: Mentation appears normal, affect normal/bright, judgement and insight intact, normal speech and appearance well-groomed.      Assessment & Plan     Osteoarthritis of spine with radiculopathy, lumbar region  Uses very sparingly as needed. 30 tablets typically lasts 2-3 months.   - traMADol-acetaminophen (ULTRACET) 37.5-325 MG tablet; Take 1 tablet by mouth every 4 hours as needed for severe pain    Mixed hyperlipidemia  Stable, labs up to date.   - atorvastatin (LIPITOR) 20 MG tablet; Take 1 tablet (20 mg) by mouth daily    Essential hypertension  Stable, labs up to date.   - carvedilol (COREG) 6.25 MG tablet; Take 1 tablet (6.25 mg) by mouth 2 times daily (with meals)  - losartan (COZAAR) 50 MG tablet; TAKE TWO TABLETS ( 100 MG) BY MOUTH ONCE DAILY    Chronic GERD  Stable. Encourage ongoing diet/exercise/.  - pantoprazole (PROTONIX) 40 MG EC tablet; Take 1 tablet (40 mg) by mouth daily    Hot flashes  - PARoxetine (PAXIL) 10 MG tablet; Take 1 tablet (10 mg) by mouth every morning    Insomnia, unspecified type  Stable.   - zolpidem (AMBIEN) 5 MG tablet; TAKE ONE TABLET BY MOUTH NIGHTLY AS NEEDED FOR SLEEP    Morbid obesity (H)  Encouraged ongoing diet/exercise modifications.     The patient indicates understanding of these issues and agrees with the plan.    Gregoria Kumari PA-C  [facility]    Video-Visit Details    Type of service:  Video Visit    Video End Time:10:59 AM    Originating Location (pt. Location): Home    Distant Location (provider location):  [facility]  [facility]     Platform used for Video Visit: [employer]

## 2022-06-10 ENCOUNTER — MYC MEDICAL ADVICE (OUTPATIENT)
Dept: FAMILY MEDICINE | Facility: CLINIC | Age: 72
End: 2022-06-10
Payer: MEDICARE

## 2022-09-01 DIAGNOSIS — I10 ESSENTIAL HYPERTENSION: ICD-10-CM

## 2022-09-01 RX ORDER — LOSARTAN POTASSIUM 50 MG/1
TABLET ORAL
Qty: 180 TABLET | Refills: 0 | Status: SHIPPED | OUTPATIENT
Start: 2022-09-01 | End: 2022-12-15

## 2022-09-01 NOTE — TELEPHONE ENCOUNTER
LDL Cholesterol Calculated   Date Value Ref Range Status   01/21/2022 68 <=100 mg/dL Final   07/08/2020 65 <100 mg/dL Final     Comment:     Desirable:       <100 mg/dl     Rx refilled per RN protocol.  Left prescription at home.

## 2022-09-11 ENCOUNTER — HEALTH MAINTENANCE LETTER (OUTPATIENT)
Age: 72
End: 2022-09-11

## 2022-09-12 ENCOUNTER — TELEPHONE (OUTPATIENT)
Dept: FAMILY MEDICINE | Facility: CLINIC | Age: 72
End: 2022-09-12

## 2022-09-12 DIAGNOSIS — M47.26 OSTEOARTHRITIS OF SPINE WITH RADICULOPATHY, LUMBAR REGION: ICD-10-CM

## 2022-09-12 NOTE — TELEPHONE ENCOUNTER
Reason for Call:  Appointment Request    Patient requesting this type of appt:  Office visit    Requested provider: Gregoria Kumari    Reason patient unable to be scheduled: Not within requested timeframe    When does patient want to be seen/preferred time: 3-7 days    Comments: Pt is calling about having 2 weeks of sharp pain in her right knee. It is not getting any better and she is hoping to be worked in this week if possible.     Could we send this information to you in [place] or would you prefer to receive a phone call?:   Patient would prefer a phone call   Okay to leave a detailed message?: Yes at [phone]    Call taken on 9/12/2022 at 2:09 PM by Khushi Kraft

## 2022-09-13 NOTE — TELEPHONE ENCOUNTER
Routing refill request to provider for review/approval because:    Requested Prescriptions   Pending Prescriptions Disp Refills    traMADol-acetaminophen (ULTRACET) 37.5-325 MG tablet [Pharmacy Med Name: TRAMADOL/ACETAMINOPHEN TABS 37.5/325MG] 30 tablet 1     Sig: TAKE 1 TABLET EVERY 4 HOURS AS NEEDED FOR SEVERE PAIN        There is no refill protocol information for this order

## 2022-09-15 ENCOUNTER — HOSPITAL ENCOUNTER (OUTPATIENT)
Dept: ULTRASOUND IMAGING | Facility: CLINIC | Age: 72
Discharge: HOME | End: 2022-09-15
Attending: PHYSICIAN ASSISTANT | Admitting: PHYSICIAN ASSISTANT
Payer: MEDICARE

## 2022-09-15 ENCOUNTER — OFFICE VISIT (OUTPATIENT)
Dept: FAMILY MEDICINE | Facility: CLINIC | Age: 72
End: 2022-09-15
Payer: MEDICARE

## 2022-09-15 VITALS
WEIGHT: 216.38 LBS | BODY MASS INDEX: 37.14 KG/M2 | TEMPERATURE: 96.8 F | SYSTOLIC BLOOD PRESSURE: 110 MMHG | HEART RATE: 89 BPM | DIASTOLIC BLOOD PRESSURE: 58 MMHG | OXYGEN SATURATION: 97 %

## 2022-09-15 DIAGNOSIS — M47.26 OSTEOARTHRITIS OF SPINE WITH RADICULOPATHY, LUMBAR REGION: ICD-10-CM

## 2022-09-15 DIAGNOSIS — R05.9 COUGH: ICD-10-CM

## 2022-09-15 DIAGNOSIS — M79.661 TENDERNESS OF RIGHT CALF: ICD-10-CM

## 2022-09-15 DIAGNOSIS — G47.00 INSOMNIA, UNSPECIFIED TYPE: ICD-10-CM

## 2022-09-15 DIAGNOSIS — M25.561 ACUTE PAIN OF RIGHT KNEE: ICD-10-CM

## 2022-09-15 DIAGNOSIS — M25.561 ACUTE PAIN OF RIGHT KNEE: Primary | ICD-10-CM

## 2022-09-15 PROCEDURE — 93971 EXTREMITY STUDY: CPT | Mod: RT

## 2022-09-15 PROCEDURE — 99214 OFFICE O/P EST MOD 30 MIN: CPT | Performed by: PHYSICIAN ASSISTANT

## 2022-09-15 RX ORDER — BENZONATATE 100 MG/1
100 CAPSULE ORAL 3 TIMES DAILY PRN
Qty: 60 CAPSULE | Refills: 1 | Status: SHIPPED | OUTPATIENT
Start: 2022-09-15 | End: 2023-03-15

## 2022-09-15 RX ORDER — TRAZODONE HYDROCHLORIDE 50 MG/1
50-150 TABLET, FILM COATED ORAL AT BEDTIME
Qty: 90 TABLET | Refills: 3 | Status: SHIPPED | OUTPATIENT
Start: 2022-09-15 | End: 2022-11-25

## 2022-09-15 ASSESSMENT — PAIN SCALES - GENERAL: PAINLEVEL: NO PAIN (0)

## 2022-09-15 NOTE — PROGRESS NOTES
Lucía Asher is a 71 year old, presenting for the following health issues:  Knee Pain      Knee Pain    History of Present Illness       Reason for visit:  Ongoing pain in right knee area. Sometimes it hurts so back difficult to walk for any distance.  Symptom onset:  3-4 weeks ago  Symptom intensity:  Moderate  Symptom progression:  Staying the same  Had these symptoms before:  No  What makes it worse:  Not asking Tylenol  What makes it better:  Taking Tylenol    She eats 2-3 servings of fruits and vegetables daily.She consumes 1 sweetened beverage(s) daily.She exercises with enough effort to increase her heart rate 10 to 19 minutes per day.  She exercises with enough effort to increase her heart rate 3 or less days per week.   She is taking medications regularly.     Patient presents today with a main concern of right knee pain. She reports that symptoms started about 3 weeks ago. She denies any change in activity or acute injury. Reports the pain is over the posterior medial aspect of her knee and does at times extend down into her calf. She reports it sometimes feels a bit swollen. No redness. She does not feel like it is in the bone. She has chronic back pain but that has been stable. No issues with her knees in the past. No history of blood clots. She reports some relief with taking Tylenol.     Taking Ambien for sleep at night. We had previously discussed considering other options for sleep aids. She reports she is really only getting about 4 hors of sleep at night. Often just cannot turn her mind off.     Has had cold symptoms recently. Has a cough that has been bothersome but improving. COVID testing negative.   Review of Systems   Constitutional, HEENT, cardiovascular, pulmonary, GI, , musculoskeletal, neuro, skin, endocrine and psych systems are negative, except as otherwise noted.      Objective    /58   Pulse 89   Temp 96.8  F (36  C) (Temporal)   Wt 98.1 kg (216 lb 6 oz)   SpO2 97%    BMI 37.14 kg/m    Body mass index is 37.14 kg/m .  Physical Exam   GENERAL: healthy, alert and no distress  HENT: ear canals and TM's normal, nose and mouth without ulcers or lesions  NECK: no adenopathy, no asymmetry, masses, or scars and thyroid normal to palpation  RESP: lungs clear to auscultation - no rales, rhonchi or wheezes  CV: regular rate and rhythm, normal S1 S2, no S3 or S4, no murmur, click or rub, no peripheral edema and peripheral pulses strong  MS: No obvious deformity of the right knee. Tenderness over the posterior-lateral aspect of knee with some mild tenderness into the right calf. Full ROM of knee without pain. Distal pulses intact   SKIN: no suspicious lesions or rashes  PSYCH: mentation appears normal, affect normal/bright        Assessment & Plan     Acute pain of right knee  Given location of her pain with radiation into the right calf an ultrasound was recommended to ensure this is not a DVT. Discussed it is possible this is related to a popliteal cyst as well. Ultrasound scheduled for this afternoon. Further treatment pending these results.   - US Lower Extremity Venous Duplex Right; Future    Tenderness of right calf  - US Lower Extremity Venous Duplex Right; Future    Insomnia, unspecified type  Will have patient trial Trazodone instead of Ambien at this time. Appropriate use and dose titration reviewed with patient.   - traZODone (DESYREL) 50 MG tablet; Take 1-3 tablets ( mg) by mouth At Bedtime    Osteoarthritis of spine with radiculopathy, lumbar region  Uses very sporadically as needed.   - traMADol-acetaminophen (ULTRACET) 37.5-325 MG tablet; Take 1 tablet by mouth every 6 hours as needed for pain    Cough  Lungs clear - likely viral as COVID testing negative. Continue supportive cares.   - benzonatate (TESSALON) 100 MG capsule; Take 1 capsule (100 mg) by mouth 3 times daily as needed for cough    The patient indicates understanding of these issues and agrees with the  plan.    Gregoria Kumari PA-C  [facility]

## 2022-10-05 ENCOUNTER — TELEPHONE (OUTPATIENT)
Dept: FAMILY MEDICINE | Facility: CLINIC | Age: 72
End: 2022-10-05

## 2022-10-05 DIAGNOSIS — I10 ESSENTIAL HYPERTENSION: Primary | ICD-10-CM

## 2022-10-05 DIAGNOSIS — Z13.29 SCREENING FOR THYROID DISORDER: ICD-10-CM

## 2022-10-05 DIAGNOSIS — E78.2 MIXED HYPERLIPIDEMIA: ICD-10-CM

## 2022-10-05 DIAGNOSIS — R73.09 ELEVATED HEMOGLOBIN A1C: ICD-10-CM

## 2022-10-05 NOTE — TELEPHONE ENCOUNTER
Patient was seen 9/15 for medicare wellness. She states she did not have any labs drawn at that visit and usually does. She has not had blood work done for a while. Wanting to ask provider if theres anything she might need drawn. Pt's  has appt scheduled for 10/27. If needed and orders are placed, patient would like to get it done then.

## 2022-10-06 NOTE — TELEPHONE ENCOUNTER
Please let patient know she had all of her routine labs completed in January. I did place orders for her today. She can complete these now or wait until January if she prefers.     Gregoria Kumari PA-C

## 2022-10-14 ENCOUNTER — LAB (OUTPATIENT)
Dept: LAB | Facility: CLINIC | Age: 72
End: 2022-10-14
Payer: MEDICARE

## 2022-10-14 DIAGNOSIS — E78.2 MIXED HYPERLIPIDEMIA: ICD-10-CM

## 2022-10-14 DIAGNOSIS — Z13.29 SCREENING FOR THYROID DISORDER: ICD-10-CM

## 2022-10-14 DIAGNOSIS — I10 ESSENTIAL HYPERTENSION: ICD-10-CM

## 2022-10-14 DIAGNOSIS — R73.09 ELEVATED HEMOGLOBIN A1C: ICD-10-CM

## 2022-10-14 LAB
ANION GAP SERPL CALCULATED.3IONS-SCNC: 4 MMOL/L (ref 3–14)
BUN SERPL-MCNC: 17 MG/DL (ref 7–30)
CALCIUM SERPL-MCNC: 8.8 MG/DL (ref 8.5–10.1)
CHLORIDE BLD-SCNC: 107 MMOL/L (ref 94–109)
CHOLEST SERPL-MCNC: 168 MG/DL
CO2 SERPL-SCNC: 28 MMOL/L (ref 20–32)
CREAT SERPL-MCNC: 1.21 MG/DL (ref 0.52–1.04)
FASTING STATUS PATIENT QL REPORTED: YES
GFR SERPL CREATININE-BSD FRML MDRD: 48 ML/MIN/1.73M2
GLUCOSE BLD-MCNC: 120 MG/DL (ref 70–99)
HBA1C MFR BLD: 5.9 % (ref 0–5.6)
HDLC SERPL-MCNC: 88 MG/DL
LDLC SERPL CALC-MCNC: 58 MG/DL
NONHDLC SERPL-MCNC: 80 MG/DL
POTASSIUM BLD-SCNC: 4.6 MMOL/L (ref 3.4–5.3)
SODIUM SERPL-SCNC: 139 MMOL/L (ref 133–144)
TRIGL SERPL-MCNC: 109 MG/DL
TSH SERPL DL<=0.005 MIU/L-ACNC: 2.26 MU/L (ref 0.4–4)

## 2022-10-14 PROCEDURE — 36415 COLL VENOUS BLD VENIPUNCTURE: CPT

## 2022-10-14 PROCEDURE — 84443 ASSAY THYROID STIM HORMONE: CPT

## 2022-10-14 PROCEDURE — 80048 BASIC METABOLIC PNL TOTAL CA: CPT

## 2022-10-14 PROCEDURE — 83036 HEMOGLOBIN GLYCOSYLATED A1C: CPT

## 2022-10-14 PROCEDURE — 80061 LIPID PANEL: CPT

## 2022-10-14 NOTE — LETTER
October 17, 2022      Lakeshia Carcamo  [address]   [place] [address]        Dear ,    We are writing to inform you of your test results.    Labs are all very stable. Kidney function continues to be a bit decreased but not changed from your baseline. Please let me know if you have any questions/concerns.    Resulted Orders   Hemoglobin A1c   Result Value Ref Range    Hemoglobin A1C 5.9 (H) 0.0 - 5.6 %      Comment:      Normal <5.7%   Prediabetes 5.7-6.4%    Diabetes 6.5% or higher     Note: Adopted from ADA consensus guidelines.   Lipid panel reflex to direct LDL Fasting   Result Value Ref Range    Cholesterol 168 <200 mg/dL    Triglycerides 109 <150 mg/dL    Direct Measure HDL 88 >=50 mg/dL    LDL Cholesterol Calculated 58 <=100 mg/dL    Non HDL Cholesterol 80 <130 mg/dL    Patient Fasting > 8hrs? Yes     Narrative    Cholesterol  Desirable:  <200 mg/dL    Triglycerides  Normal:  Less than 150 mg/dL  Borderline High:  150-199 mg/dL  High:  200-499 mg/dL  Very High:  Greater than or equal to 500 mg/dL    Direct Measure HDL  Female:  Greater than or equal to 50 mg/dL   Male:  Greater than or equal to 40 mg/dL    LDL Cholesterol  Desirable:  <100mg/dL  Above Desirable:  100-129 mg/dL   Borderline High:  130-159 mg/dL   High:  160-189 mg/dL   Very High:  >= 190 mg/dL    Non HDL Cholesterol  Desirable:  130 mg/dL  Above Desirable:  130-159 mg/dL  Borderline High:  160-189 mg/dL  High:  190-219 mg/dL  Very High:  Greater than or equal to 220 mg/dL   Basic metabolic panel  (Ca, Cl, CO2, Creat, Gluc, K, Na, BUN)   Result Value Ref Range    Sodium 139 133 - 144 mmol/L    Potassium 4.6 3.4 - 5.3 mmol/L    Chloride 107 94 - 109 mmol/L    Carbon Dioxide (CO2) 28 20 - 32 mmol/L    Anion Gap 4 3 - 14 mmol/L    Urea Nitrogen 17 7 - 30 mg/dL    Creatinine 1.21 (H) 0.52 - 1.04 mg/dL    Calcium 8.8 8.5 - 10.1 mg/dL    Glucose 120 (H) 70 - 99 mg/dL    GFR Estimate 48 (L) >60 mL/min/1.73m2      Comment:       Effective December 21, 2021 eGFRcr in adults is calculated using the 2021 CKD-EPI creatinine equation which includes age and gender (Bobby et al., NEJM, DOI: 10.1056/IGSYbr6452949)       If you have any questions or concerns, please call the clinic at the number listed above.       Sincerely,      Gregoria Kumari PA-C

## 2022-10-17 NOTE — RESULT ENCOUNTER NOTE
Please notify patient/parent of result as [employer] message not read.    Gregoria Kumari PA-C

## 2022-11-07 ENCOUNTER — MYC MEDICAL ADVICE (OUTPATIENT)
Dept: OTOLARYNGOLOGY | Facility: CLINIC | Age: 72
End: 2022-11-07

## 2022-11-25 DIAGNOSIS — G47.00 INSOMNIA, UNSPECIFIED TYPE: ICD-10-CM

## 2022-11-25 RX ORDER — ZOLPIDEM TARTRATE 5 MG/1
TABLET ORAL
Qty: 30 TABLET | Refills: 5 | Status: SHIPPED | OUTPATIENT
Start: 2022-11-25 | End: 2023-12-11

## 2022-11-25 NOTE — TELEPHONE ENCOUNTER
Spoke with patient and informed of note. Patient stated thatTrazodone is making her feeling like no matter what the dose she felt like she couldn't get out of the brain fog. Patient is requesting to switch back to AMBIEN.     Sloane Cartwright MA

## 2022-11-25 NOTE — TELEPHONE ENCOUNTER
Requested Prescriptions   Pending Prescriptions Disp Refills     zolpidem (AMBIEN) 5 MG tablet 30 tablet 5     Sig: TAKE ONE TABLET BY MOUTH NIGHTLY AS NEEDED FOR SLEEP       Routing refill request to provider for review/approval because:  Drug not on the G, P or [facility] refill protocol or controlled substance

## 2022-11-25 NOTE — TELEPHONE ENCOUNTER
Please call patient to see if this was sent by pharmacy in error. When last talked to patient Trazodone was working well.     Gregoria Kumari PA-C

## 2022-11-29 DIAGNOSIS — M47.26 OSTEOARTHRITIS OF SPINE WITH RADICULOPATHY, LUMBAR REGION: ICD-10-CM

## 2023-02-15 ENCOUNTER — TELEPHONE (OUTPATIENT)
Dept: FAMILY MEDICINE | Facility: CLINIC | Age: 73
End: 2023-02-15
Payer: MEDICARE

## 2023-03-10 ASSESSMENT — ENCOUNTER SYMPTOMS
WEAKNESS: 0
HEMATURIA: 0
HEMATOCHEZIA: 0
SHORTNESS OF BREATH: 0
ABDOMINAL PAIN: 0
DYSURIA: 0
FREQUENCY: 0
HEARTBURN: 1
CONSTIPATION: 0
DIARRHEA: 0
CHILLS: 0
PALPITATIONS: 0
ARTHRALGIAS: 0
DIZZINESS: 0
FEVER: 0
MYALGIAS: 0
COUGH: 0
EYE PAIN: 0
NERVOUS/ANXIOUS: 0
PARESTHESIAS: 0
BREAST MASS: 0
JOINT SWELLING: 0
HEADACHES: 0
SORE THROAT: 0
NAUSEA: 0

## 2023-03-10 ASSESSMENT — ACTIVITIES OF DAILY LIVING (ADL): CURRENT_FUNCTION: NO ASSISTANCE NEEDED

## 2023-03-15 ENCOUNTER — OFFICE VISIT (OUTPATIENT)
Dept: FAMILY MEDICINE | Facility: CLINIC | Age: 73
End: 2023-03-15
Payer: MEDICARE

## 2023-03-15 VITALS
RESPIRATION RATE: 19 BRPM | DIASTOLIC BLOOD PRESSURE: 73 MMHG | OXYGEN SATURATION: 98 % | BODY MASS INDEX: 38.65 KG/M2 | HEIGHT: 64 IN | WEIGHT: 226.4 LBS | SYSTOLIC BLOOD PRESSURE: 122 MMHG | HEART RATE: 84 BPM

## 2023-03-15 DIAGNOSIS — K21.00 GASTROESOPHAGEAL REFLUX DISEASE WITH ESOPHAGITIS WITHOUT HEMORRHAGE: ICD-10-CM

## 2023-03-15 DIAGNOSIS — I10 PRIMARY HYPERTENSION: ICD-10-CM

## 2023-03-15 DIAGNOSIS — Z12.31 ENCOUNTER FOR SCREENING MAMMOGRAM FOR BREAST CANCER: ICD-10-CM

## 2023-03-15 DIAGNOSIS — M47.26 OSTEOARTHRITIS OF SPINE WITH RADICULOPATHY, LUMBAR REGION: ICD-10-CM

## 2023-03-15 DIAGNOSIS — I10 ESSENTIAL HYPERTENSION: ICD-10-CM

## 2023-03-15 DIAGNOSIS — R23.2 HOT FLASHES: ICD-10-CM

## 2023-03-15 DIAGNOSIS — R25.2 MUSCLE CRAMP: ICD-10-CM

## 2023-03-15 DIAGNOSIS — Z00.00 ENCOUNTER FOR MEDICARE ANNUAL WELLNESS EXAM: Primary | ICD-10-CM

## 2023-03-15 DIAGNOSIS — Z12.11 COLON CANCER SCREENING: ICD-10-CM

## 2023-03-15 DIAGNOSIS — E66.01 MORBID OBESITY (H): ICD-10-CM

## 2023-03-15 DIAGNOSIS — K21.9 CHRONIC GERD: ICD-10-CM

## 2023-03-15 DIAGNOSIS — R73.09 ELEVATED HEMOGLOBIN A1C: ICD-10-CM

## 2023-03-15 DIAGNOSIS — E78.2 MIXED HYPERLIPIDEMIA: ICD-10-CM

## 2023-03-15 LAB
ANION GAP SERPL CALCULATED.3IONS-SCNC: 9 MMOL/L (ref 7–15)
BUN SERPL-MCNC: 18 MG/DL (ref 8–23)
CALCIUM SERPL-MCNC: 9.2 MG/DL (ref 8.8–10.2)
CHLORIDE SERPL-SCNC: 103 MMOL/L (ref 98–107)
CREAT SERPL-MCNC: 1.01 MG/DL (ref 0.51–0.95)
DEPRECATED HCO3 PLAS-SCNC: 26 MMOL/L (ref 22–29)
GFR SERPL CREATININE-BSD FRML MDRD: 59 ML/MIN/1.73M2
GLUCOSE SERPL-MCNC: 111 MG/DL (ref 70–99)
HBA1C MFR BLD: 6 %
MAGNESIUM SERPL-MCNC: 1.8 MG/DL (ref 1.7–2.3)
POTASSIUM SERPL-SCNC: 4.2 MMOL/L (ref 3.4–5.3)
SODIUM SERPL-SCNC: 138 MMOL/L (ref 136–145)

## 2023-03-15 PROCEDURE — 80048 BASIC METABOLIC PNL TOTAL CA: CPT | Performed by: PHYSICIAN ASSISTANT

## 2023-03-15 PROCEDURE — 36415 COLL VENOUS BLD VENIPUNCTURE: CPT | Performed by: PHYSICIAN ASSISTANT

## 2023-03-15 PROCEDURE — 83036 HEMOGLOBIN GLYCOSYLATED A1C: CPT | Performed by: PHYSICIAN ASSISTANT

## 2023-03-15 PROCEDURE — 99214 OFFICE O/P EST MOD 30 MIN: CPT | Mod: 25 | Performed by: PHYSICIAN ASSISTANT

## 2023-03-15 PROCEDURE — 83735 ASSAY OF MAGNESIUM: CPT | Performed by: PHYSICIAN ASSISTANT

## 2023-03-15 PROCEDURE — G0439 PPPS, SUBSEQ VISIT: HCPCS | Performed by: PHYSICIAN ASSISTANT

## 2023-03-15 RX ORDER — FAMOTIDINE 20 MG/1
20 TABLET, FILM COATED ORAL 2 TIMES DAILY
Qty: 180 TABLET | Refills: 0 | Status: SHIPPED | OUTPATIENT
Start: 2023-03-15 | End: 2023-06-16

## 2023-03-15 RX ORDER — PAROXETINE 10 MG/1
10 TABLET, FILM COATED ORAL EVERY MORNING
Qty: 90 TABLET | Refills: 1 | Status: SHIPPED | OUTPATIENT
Start: 2023-03-15 | End: 2023-08-30

## 2023-03-15 RX ORDER — PANTOPRAZOLE SODIUM 40 MG/1
40 TABLET, DELAYED RELEASE ORAL DAILY
Qty: 90 TABLET | Refills: 1 | Status: SHIPPED | OUTPATIENT
Start: 2023-03-15 | End: 2024-01-22

## 2023-03-15 RX ORDER — CARVEDILOL 6.25 MG/1
6.25 TABLET ORAL 2 TIMES DAILY WITH MEALS
Qty: 180 TABLET | Refills: 1 | Status: SHIPPED | OUTPATIENT
Start: 2023-03-15 | End: 2023-08-30

## 2023-03-15 RX ORDER — ATORVASTATIN CALCIUM 20 MG/1
20 TABLET, FILM COATED ORAL DAILY
Qty: 90 TABLET | Refills: 1 | Status: SHIPPED | OUTPATIENT
Start: 2023-03-15 | End: 2023-08-30

## 2023-03-15 RX ORDER — LOSARTAN POTASSIUM 50 MG/1
TABLET ORAL
Qty: 180 TABLET | Refills: 0 | Status: SHIPPED | OUTPATIENT
Start: 2023-03-15 | End: 2023-06-09

## 2023-03-15 ASSESSMENT — ENCOUNTER SYMPTOMS
HEARTBURN: 1
DIARRHEA: 0
CHILLS: 0
ARTHRALGIAS: 0
SORE THROAT: 0
NERVOUS/ANXIOUS: 0
FEVER: 0
NAUSEA: 0
WEAKNESS: 0
EYE PAIN: 0
SHORTNESS OF BREATH: 0
DYSURIA: 0
MYALGIAS: 0
JOINT SWELLING: 0
HEADACHES: 0
CONSTIPATION: 0
HEMATOCHEZIA: 0
ABDOMINAL PAIN: 0
HEMATURIA: 0
PALPITATIONS: 0
BREAST MASS: 0
FREQUENCY: 0
COUGH: 0
PARESTHESIAS: 0
DIZZINESS: 0

## 2023-03-15 ASSESSMENT — PAIN SCALES - GENERAL: PAINLEVEL: NO PAIN (0)

## 2023-03-15 ASSESSMENT — ACTIVITIES OF DAILY LIVING (ADL): CURRENT_FUNCTION: NO ASSISTANCE NEEDED

## 2023-03-15 NOTE — LETTER
March 21, 2023      Lakeshia Carcamo  [address]  [place] [address]        Dear ,    We are writing to inform you of your test results.    It was nice to see you yesterday. Labs all look good. Your A1c has increased just slightly. Keep working on diet and exercise as able. Your kidney function has improved which is great news. Calcium and electrolytes look great.      I also got your message about a mammogram. I have an order in for this. You can schedule through [facility] or by calling [phone].     Please let me know if you have any questions/concerns.  Gregoria Kumari    Resulted Orders   Basic metabolic panel  (Ca, Cl, CO2, Creat, Gluc, K, Na, BUN)   Result Value Ref Range    Sodium 138 136 - 145 mmol/L    Potassium 4.2 3.4 - 5.3 mmol/L    Chloride 103 98 - 107 mmol/L    Carbon Dioxide (CO2) 26 22 - 29 mmol/L    Anion Gap 9 7 - 15 mmol/L    Urea Nitrogen 18.0 8.0 - 23.0 mg/dL    Creatinine 1.01 (H) 0.51 - 0.95 mg/dL    Calcium 9.2 8.8 - 10.2 mg/dL    Glucose 111 (H) 70 - 99 mg/dL    GFR Estimate 59 (L) >60 mL/min/1.73m2      Comment:      eGFR calculated using 2021 CKD-EPI equation.   Hemoglobin A1c   Result Value Ref Range    Hemoglobin A1C 6.0 (H) <5.7 %      Comment:      Normal <5.7%   Prediabetes 5.7-6.4%    Diabetes 6.5% or higher     Note: Adopted from ADA consensus guidelines.       If you have any questions or concerns, please call the clinic at the number listed above.       Sincerely,      Gregoria Kumari PA-C

## 2023-03-15 NOTE — PROGRESS NOTES
"   SUBJECTIVE:   CC: Lakeshia is an 72 year old who presents for preventive health visit.     Healthy Habits:     In general, how would you rate your overall health?  Fair    Frequency of exercise:  2-3 days/week    Duration of exercise:  15-30 minutes    Do you usually eat at least 4 servings of fruit and vegetables a day, include whole grains    & fiber and avoid regularly eating high fat or \"junk\" foods?  No    Taking medications regularly:  Yes    Ability to successfully perform activities of daily living:  No assistance needed    Home Safety:  No safety concerns identified    Hearing Impairment:  Need to ask people to speak up or repeat themselves    In the past 6 months, have you been bothered by leaking of urine? Yes    In general, how would you rate your overall mental or emotional health?  Good      PHQ-2 Total Score: 0    Additional concerns today:  No    Ability to successfully perform activities of daily living: Yes, no assistance needed  Home safety:  none identified   Hearing impairment: Yes, None     Patient presents today for follow-up of blood pressure. Numbers have been well controlled. Tolerating medications well without side effects. Monitoring salt in diet. Patient denies headaches, vision changes, chest pain, shortness of breath or paresthesias.    Has had intermittent cramping in her legs. Very sporadic and short lived. Would like to ensure this is not related to her electrolytes.     Chronic pain is stable. Back does limit her from some activities. She uses Tramadol once daily as needed - typically at night. This has helped her sleep better.     Moods stable with current dose of Paxil.     Today's PHQ-2 Score:   PHQ-2 ( 1999 [employer]) 3/10/2023   Q1: Little interest or pleasure in doing things 0   Q2: Feeling down, depressed or hopeless 0   PHQ-2 Score 0   PHQ-2 Total Score (12-17 Years)- Positive if 3 or more points; Administer PHQ-A if positive -   Q1: Little interest or pleasure in doing " things Not at all   Q2: Feeling down, depressed or hopeless Not at all   PHQ-2 Score 0     Social History     Tobacco Use     Smoking status: Never     Smokeless tobacco: Never   Substance Use Topics     Alcohol use: Yes     Comment: socially on occasion         Alcohol Use 3/10/2023   Prescreen: >3 drinks/day or >7 drinks/week? No   No flowsheet data found.    Reviewed orders with patient.  Reviewed health maintenance and updated orders accordingly - Yes  BP Readings from Last 3 Encounters:   03/15/23 122/73   09/15/22 110/58   01/21/22 128/80    Wt Readings from Last 3 Encounters:   03/15/23 102.7 kg (226 lb 6.4 oz)   09/15/22 98.1 kg (216 lb 6 oz)   01/21/22 102.4 kg (225 lb 12.8 oz)                  Patient Active Problem List   Diagnosis     High cholesterol     HTN (hypertension)     Vitamin B12 deficiency     Obesity (BMI 35.0-39.9) with comorbidity (H)     Vitamin D deficiency     DDD (degenerative disc disease), lumbar     Osteoarthritis of spine with radiculopathy, lumbar region     Psychophysiological insomnia     Gastroesophageal reflux disease with esophagitis     Controlled substance agreement signed     Elevated hemoglobin A1c     Past Surgical History:   Procedure Laterality Date     BIOPSY  2018    Hysterectomy     CATARACT IOL, RT/LT Bilateral      CATARACT IOL, RT/LT Bilateral 2015     COLONOSCOPY  2016    Biopsy negitive     ENT SURGERY  1956     HYSTERECTOMY       HYSTERECTOMY, PAP NO LONGER INDICATED       ORTHOPEDIC SURGERY  2016    both wrist Carpal tunnel surgery       Social History     Tobacco Use     Smoking status: Never     Smokeless tobacco: Never   Substance Use Topics     Alcohol use: Yes     Comment: socially on occasion     Family History   Problem Relation Age of Onset     Pancreatic Cancer Mother      Hypertension Mother      Obesity Mother      ALS Father      Coronary Artery Disease Father         Valve replacement. Aortic valve calcification     Hyperlipidemia Father       Obesity Father      Obesity Maternal Grandmother      Glaucoma Maternal Grandfather      Atrial fibrillation Brother      Glaucoma Other      Coronary Artery Disease Brother         AFIB     Hypertension Brother      Hyperlipidemia Brother      Obesity Brother      Macular Degeneration No family hx of          Current Outpatient Medications   Medication Sig Dispense Refill     atorvastatin (LIPITOR) 20 MG tablet Take 1 tablet (20 mg) by mouth daily 90 tablet 1     carvedilol (COREG) 6.25 MG tablet Take 1 tablet (6.25 mg) by mouth 2 times daily (with meals) 180 tablet 1     Coenzyme Q10 (COQ10 PO) 1 tablet daily       famotidine (PEPCID) 20 MG tablet Take 1 tablet (20 mg) by mouth 2 times daily 180 tablet 0     losartan (COZAAR) 50 MG tablet TAKE TWO TABLETS ( 100 MG) BY MOUTH ONCE DAILY 180 tablet 0     pantoprazole (PROTONIX) 40 MG EC tablet Take 1 tablet (40 mg) by mouth daily 90 tablet 1     PARoxetine (PAXIL) 10 MG tablet Take 1 tablet (10 mg) by mouth every morning 90 tablet 1     traMADol-acetaminophen (ULTRACET) 37.5-325 MG tablet Take 1 tablet by mouth every 4 hours as needed for severe pain (7-10) 30 tablet 1     TURMERIC PO        Vitamin D, Cholecalciferol, 25 MCG (1000 UT) CAPS 1 capsule daily       zolpidem (AMBIEN) 5 MG tablet TAKE ONE TABLET BY MOUTH NIGHTLY AS NEEDED FOR SLEEP 30 tablet 5       Breast Cancer Screening:        History of abnormal Pap smear: NO - age 66 - see link Cervical Cytology Screening Guidelines     Reviewed and updated as needed this visit by clinical staff   Tobacco  Allergies  Meds              Reviewed and updated as needed this visit by Provider                     Review of Systems   Constitutional: Negative for chills and fever.   HENT: Negative for congestion, ear pain, hearing loss and sore throat.    Eyes: Positive for visual disturbance. Negative for pain.   Respiratory: Negative for cough and shortness of breath.    Cardiovascular: Negative for chest pain,  "palpitations and peripheral edema.   Gastrointestinal: Positive for heartburn. Negative for abdominal pain, constipation, diarrhea, hematochezia and nausea.   Breasts:  Negative for tenderness, breast mass and discharge.   Genitourinary: Negative for dysuria, frequency, genital sores, hematuria, pelvic pain, urgency, vaginal bleeding and vaginal discharge.   Musculoskeletal: Negative for arthralgias, joint swelling and myalgias.   Skin: Negative for rash.   Neurological: Negative for dizziness, weakness, headaches and paresthesias.   Psychiatric/Behavioral: Negative for mood changes. The patient is not nervous/anxious.         OBJECTIVE:   /73   Pulse 84   Resp 19   Ht 1.626 m (5' 4\")   Wt 102.7 kg (226 lb 6.4 oz)   SpO2 98%   BMI 38.86 kg/m    Physical Exam  GENERAL: healthy, alert and no distress  EYES: Eyes grossly normal to inspection, PERRL and conjunctivae and sclerae normal  HENT: ear canals and TM's normal, nose and mouth without ulcers or lesions  NECK: no adenopathy, no asymmetry, masses, or scars and thyroid normal to palpation  RESP: lungs clear to auscultation - no rales, rhonchi or wheezes  CV: regular rate and rhythm, normal S1 S2, no S3 or S4, no murmur, click or rub, no peripheral edema and peripheral pulses strong  ABDOMEN: soft, nontender, no hepatosplenomegaly, no masses and bowel sounds normal  MS: no gross musculoskeletal defects noted, no edema  SKIN: no suspicious lesions or rashes  NEURO: Normal strength and tone, mentation intact and speech normal  PSYCH: mentation appears normal, affect normal/bright    Diagnostic Test Results:  Labs reviewed in Epic    ASSESSMENT/PLAN:   (Z00.00) Encounter for Medicare annual wellness exam  (primary encounter diagnosis)    (I10) Primary hypertension  Comment: Stable ,continue current medications without change.   Plan: Basic metabolic panel  (Ca, Cl, CO2, Creat,         Gluc, K, Na, BUN)         (I10) Essential hypertension  Plan: carvedilol " "(COREG) 6.25 MG tablet, losartan         (COZAAR) 50 MG tablet    (E66.01) Morbid obesity (H)  Comment: Weight addressed - encouraged ongoing diet and exercise modifications as able.     (M47.26) Osteoarthritis of spine with radiculopathy, lumbar region  Comment: Pain overall stable. Uses Tramadol once at night for pain control. Narcotic safety again reviewed.   Plan: traMADol-acetaminophen (ULTRACET) 37.5-325 MG         tablet    (K21.00) Gastroesophageal reflux disease with esophagitis without hemorrhage  Comment: Stable - recommended continued work on weight loss and dietary modifications.   Plan: famotidine (PEPCID) 20 MG tablet          (R73.09) Elevated hemoglobin A1c  Plan: Hemoglobin A1c    (R25.2) Muscle cramp  Plan: Magnesium    (E78.2) Mixed hyperlipidemia  Plan: atorvastatin (LIPITOR) 20 MG tablet    (K21.9) Chronic GERD  Plan: pantoprazole (PROTONIX) 40 MG EC tablet    (R23.2) Hot flashes  Plan: PARoxetine (PAXIL) 10 MG tablet    (Z12.11) Colon cancer screening  Plan: Fecal colorectal cancer screen (FIT)    (Z12.31) Encounter for screening mammogram for breast cancer  Plan: [place] Screen Bilateral w/[place]lan, CANCELED: *[place]         Screening Digital Bilateral      Patient has been advised of split billing requirements and indicates understanding: Yes      COUNSELING:  Reviewed preventive health counseling, as reflected in patient instructions      BMI:   Estimated body mass index is 38.86 kg/m  as calculated from the following:    Height as of this encounter: 1.626 m (5' 4\").    Weight as of this encounter: 102.7 kg (226 lb 6.4 oz).   Weight management plan: Discussed healthy diet and exercise guidelines      She reports that she has never smoked. She has never used smokeless tobacco.    MAYE [facility]    Identified health risks:    The patient was provided with suggestions to help her develop a healthy physical lifestyle.  The patient was counseled and encouraged to " consider modifying their diet and eating habits. She was provided with information on recommended healthy diet options.  The patient was provided with written information regarding signs of hearing loss.  Information on urinary incontinence and treatment options given to patient.

## 2023-03-15 NOTE — PATIENT INSTRUCTIONS
Patient Education   Personalized Prevention Plan  You are due for the preventive services outlined below.  Your care team is available to assist you in scheduling these services.  If you have already completed any of these items, please share that information with your care team to update in your medical record.  Health Maintenance Due   Topic Date Due     Annual Wellness Visit  01/21/2023     Your Health Risk Assessment indicates you feel you are not in good health    A healthy lifestyle helps keep the body fit and the mind alert. It helps protect you from disease, helps you fight disease, and helps prevent chronic disease (disease that doesn't go away) from getting worse. This is important as you get older and begin to notice twinges in muscles and joints and a decline in the strength and stamina you once took for granted. A healthy lifestyle includes good healthcare, good nutrition, weight control, recreation, and regular exercise. Avoid harmful substances and do what you can to keep safe. Another part of a healthy lifestyle is stay mentally active and socially involved.    Good healthcare     Have a wellness visit every year.     If you have new symptoms, let us know right away. Don't wait until the next checkup.     Take medicines exactly as prescribed and keep your medicines in a safe place. Tell us if your medicine causes problems.   Healthy diet and weight control     Eat 3 or 4 small, nutritious, low-fat, high-fiber meals a day. Include a variety of fruits, vegetables, and whole-grain foods.     Make sure you get enough calcium in your diet. Calcium, vitamin D, and exercise help prevent osteoporosis (bone thinning).     If you live alone, try eating with others when you can. That way you get a good meal and have company while you eat it.     Try to keep a healthy weight. If you eat more calories than your body uses for energy, it will be stored as fat and you will gain weight.     Recreation   Recreation  is not limited to sports and team events. It includes any activity that provides relaxation, interest, enjoyment, and exercise. Recreation provides an outlet for physical, mental, and social energy. It can give a sense of worth and achievement. It can help you stay healthy.    Mental Exercise and Social Involvement  Mental and emotional health is as important as physical health. Keep in touch with friends and family. Stay as active as possible. Continue to learn and challenge yourself.   Things you can do to stay mentally active are:    Learn something new, like a foreign language or musical instrument.     Play SCRABBLE or do crossword puzzles. If you cannot find people to play these games with you at home, you can play them with others on your computer through the Internet.     Join a games club--anything from card games to chess or checkers or lawn bowling.     Start a new hobby.     Go back to school.     Volunteer.     Read.   Keep up with world events.    Understanding USDA MyPlate  The USDA has guidelines to help you make healthy food choices. These are called MyPlate. MyPlate shows the food groups that make up healthy meals using the image of a place setting. Before you eat, think about the healthiest choices for what to put on your plate or in your cup or bowl. To learn more about building a healthy plate, visit www.choosemyplate.gov.    The food groups    Fruits. Any fruit or 100% fruit juice counts as part of the Fruit Group. Fruits may be fresh, canned, frozen, or dried, and may be whole, cut-up, or pureed. Make 1/2 of your plate fruits and vegetables.    Vegetables. Any vegetable or 100% vegetable juice counts as a member of the Vegetable Group. Vegetables may be fresh, frozen, canned, or dried. They can be served raw or cooked and may be whole, cut-up, or mashed. Make 1/2 of your plate fruits and vegetables.    Grains. All foods made from grains are part of the Grains Group. These include wheat, rice,  oats, cornmeal, and barley. Grains are often used to make foods such as bread, pasta, oatmeal, cereal, tortillas, and grits. Grains should be no more than 1/4 of your plate. At least half of your grains should be whole grains.    Protein. This group includes meat, poultry, seafood, beans and peas, eggs, processed soy products (such as tofu), nuts (including nut butters), and seeds. Make protein choices no more than 1/4 of your plate. Meat and poultry choices should be lean or low fat.    Dairy. The Dairy Group includes all fluid milk products and foods made from milk that contain calcium, such as yogurt and cheese. (Foods that have little calcium, such as cream, butter, and cream cheese, are not part of this group.) Most dairy choices should be low-fat or fat-free.    Oils. Oils aren't a food group, but they do contain essential nutrients. However it's important to watch your intake of oils. These are fats that are liquid at room temperature. They include canola, corn, olive, soybean, vegetable, and sunflower oil. Foods that are mainly oil include mayonnaise, certain salad dressings, and soft margarines. You likely already get your daily oil allowance from the foods you eat.  Things to limit  Eating healthy also means limiting these things in your diet:       Salt (sodium). Many processed foods have a lot of sodium. To keep sodium intake down, eat fresh vegetables, meats, poultry, and seafood when possible. Purchase low-sodium, reduced-sodium, or no-salt-added food products at the store. And don't add salt to your meals at home. Instead, season them with herbs and spices such as dill, oregano, cumin, and paprika. Or try adding flavor with lemon or lime zest and juice.    Saturated fat. Saturated fats are most often found in animal products such as beef, pork, and chicken. They are often solid at room temperature, such as butter. To reduce your saturated fat intake, choose leaner cuts of meat and poultry. And try  healthier cooking methods such as grilling, broiling, roasting, or baking. For a simple lower-fat swap, use plain nonfat yogurt instead of mayonnaise when making potato salad or macaroni salad.    Added sugars. These are sugars added to foods. They are in foods such as ice cream, candy, soda, fruit drinks, sports drinks, energy drinks, cookies, pastries, jams, and syrups. Cut down on added sugars by sharing sweet treats with a family member or friend. You can also choose fruit for dessert, and drink water or other unsweetened beverages.     [employer] last reviewed this educational content on 6/1/2020 2000-2021 [employer]. All rights reserved. This information is not intended as a substitute for professional medical care. Always follow your healthcare professional's instructions.          Signs of Hearing Loss      Hearing much better with one ear can be a sign of hearing loss.   Hearing loss is a problem shared by many people. In fact, it is one of the most common health problems, particularly as people age. Most people age 65 and older have some hearing loss. By age 80, almost everyone does. Hearing loss often occurs slowly over the years. So you may not realize your hearing has gotten worse.  Have your hearing checked  Call your healthcare provider if you:    Have to strain to hear normal conversation    Have to watch other people s faces very carefully to follow what they re saying    Need to ask people to repeat what they ve said    Often misunderstand what people are saying    Turn the volume of the television or radio up so high that others complain    Feel that people are mumbling when they re talking to you    Find that the effort to hear leaves you feeling tired and irritated    Notice, when using the phone, that you hear better with one ear than the other  [employer] last reviewed this educational content on 1/1/2020 2000-2021 [employer]. All rights reserved. This  information is not intended as a substitute for professional medical care. Always follow your healthcare professional's instructions.          Urinary Incontinence, Female (Adult)   Urinary incontinence means loss of bladder control. This problem affects many women, especially as they get older. If you have incontinence, you may be embarrassed to ask for help. But know that this problem can be treated.   Types of Incontinence  There are different types of incontinence. Two of the main types are described here. You can have more than one type.     Stress incontinence. With this type, urine leaks when pressure (stress) is put on the bladder. This may happen when you cough, sneeze, or laugh. Stress incontinence most often occurs because the pelvic floor muscles that support the bladder and urethra are weak. This can happen after pregnancy and vaginal childbirth or a hysterectomy. It can also be due to excess body weight or hormone changes.    Urge incontinence (also called overactive bladder). With this type, a sudden urge to urinate is felt often. This may happen even though there may not be much urine in the bladder. The need to urinate often during the night is common. Urge incontinence most often occurs because of bladder spasms. This may be due to bladder irritation or infection. Damage to bladder nerves or pelvic muscles, constipation, and certain medicines can also lead to urge incontinence.  Treatment depends on the cause. Further evaluation is needed to find the type you have. This will likely include an exam and certain tests. Based on the results, you and your healthcare provider can then plan treatment. Until a diagnosis is made, the home care tips below can help ease symptoms.   Home care    Do pelvic floor muscle exercises, if they are prescribed. The pelvic floor muscles help support the bladder and urethra. Many women find that their symptoms improve when doing special exercises that strengthen these  muscles. To do the exercises, contract the muscles you would use to stop your stream of urine. But do this when you re not urinating. Hold for 10 seconds, then relax. Repeat 10 to 20 times in a row, at least 3 times a day. Your healthcare provider may give you other instructions for how to do the exercises and how often.    Keep a bladder diary. This helps track how often and how much you urinate over a set period of time. Bring this diary with you to your next visit with the provider. The information can help your provider learn more about your bladder problem.    Lose weight, if advised to by your provider. Extra weight puts pressure on the bladder. Your provider can help you create a weight-loss plan that s right for you. This may include exercising more and making certain diet changes.    Don't have foods and drinks that may irritate the bladder. These can include alcohol and caffeinated drinks.    Quit smoking. Smoking and other tobacco use can lead to a long-term (chronic) cough that strains the pelvic floor muscles. Smoking may also damage the bladder and urethra. Talk with your provider about treatments or methods you can use to quit smoking.    If drinking large amounts of fluid makes you have symptoms, you may be advised to limit your fluid intake. You may also be advised to drink most of your fluids during the day and to limit fluids at night.    If you re worried about urine leakage or accidents, you may wear absorbent pads to catch urine. Change the pads often. This helps reduce discomfort. It may also reduce the risk of skin or bladder infections.    Follow-up care  Follow up with your healthcare provider, or as directed. It may take some to find the right treatment for your problem. But healthy lifestyle changes can be made right away. These include such things as exercising on a regular basis, eating a healthy diet, losing weight (if needed), and quitting smoking. Your treatment plan may include  special therapies or medicines. Certain procedures or surgery may also be options. Talk about any questions you have with your provider.   When to seek medical advice  Call the healthcare provider right away if any of these occur:    Fever of 100.4 F (38 C) or higher, or as directed by your provider    Bladder pain or fullness    Belly swelling    Nausea or vomiting    Back pain    Weakness, dizziness, or fainting  [employer] last reviewed this educational content on 1/1/2020 2000-2021 [employer]. All rights reserved. This information is not intended as a substitute for professional medical care. Always follow your healthcare professional's instructions.

## 2023-03-23 ENCOUNTER — HOSPITAL ENCOUNTER (OUTPATIENT)
Dept: MAMMOGRAPHY | Facility: CLINIC | Age: 73
Discharge: HOME | End: 2023-03-23
Attending: PHYSICIAN ASSISTANT | Admitting: PHYSICIAN ASSISTANT
Payer: MEDICARE

## 2023-03-23 DIAGNOSIS — Z12.31 ENCOUNTER FOR SCREENING MAMMOGRAM FOR BREAST CANCER: ICD-10-CM

## 2023-03-23 PROCEDURE — 77067 SCR MAMMO BI INCL CAD: CPT

## 2023-06-08 DIAGNOSIS — I10 ESSENTIAL HYPERTENSION: ICD-10-CM

## 2023-06-08 NOTE — TELEPHONE ENCOUNTER
Pending Prescriptions:                       Disp   Refills    losartan (COZAAR) 50 MG tablet [Pharmacy M*180 ta*3        Sig: TAKE 2 TABLETS ONCE DAILY      Routing refill request to provider for review/approval because:  Labs out of range:  west Nunez RN on 6/8/2023 at 5:33 PM

## 2023-06-09 RX ORDER — LOSARTAN POTASSIUM 50 MG/1
TABLET ORAL
Qty: 180 TABLET | Refills: 3 | Status: SHIPPED | OUTPATIENT
Start: 2023-06-09 | End: 2024-05-17

## 2023-06-15 DIAGNOSIS — K21.00 GASTROESOPHAGEAL REFLUX DISEASE WITH ESOPHAGITIS WITHOUT HEMORRHAGE: ICD-10-CM

## 2023-06-15 DIAGNOSIS — M47.26 OSTEOARTHRITIS OF SPINE WITH RADICULOPATHY, LUMBAR REGION: ICD-10-CM

## 2023-06-15 NOTE — TELEPHONE ENCOUNTER
Pending Prescriptions:                       Disp   Refills    traMADol-acetaminophen (ULTRACET) 37.5-325*30 tab*1        Sig: TAKE 1 TABLET EVERY 4 HOURS AS NEEDED FOR SEVERE PAIN           (7 TO 10)    famotidine (PEPCID) 20 MG tablet [Pharmacy*180 ta*3        Sig: TAKE 1 TABLET TWICE A DAY      Routing refill request to provider for review/approval because:  Drug not on the [facility] refill protocol     Gia Nunez RN on 6/15/2023 at 5:27 PM

## 2023-06-16 RX ORDER — FAMOTIDINE 20 MG/1
TABLET, FILM COATED ORAL
Qty: 180 TABLET | Refills: 3 | Status: SHIPPED | OUTPATIENT
Start: 2023-06-16 | End: 2024-07-29

## 2023-08-30 DIAGNOSIS — R23.2 HOT FLASHES: ICD-10-CM

## 2023-08-30 DIAGNOSIS — I10 ESSENTIAL HYPERTENSION: ICD-10-CM

## 2023-08-30 DIAGNOSIS — E78.2 MIXED HYPERLIPIDEMIA: ICD-10-CM

## 2023-08-30 RX ORDER — PAROXETINE 10 MG/1
10 TABLET, FILM COATED ORAL EVERY MORNING
Qty: 90 TABLET | Refills: 0 | Status: SHIPPED | OUTPATIENT
Start: 2023-08-30 | End: 2023-12-11

## 2023-08-30 RX ORDER — ATORVASTATIN CALCIUM 20 MG/1
20 TABLET, FILM COATED ORAL DAILY
Qty: 90 TABLET | Refills: 0 | Status: SHIPPED | OUTPATIENT
Start: 2023-08-30 | End: 2024-01-22

## 2023-08-30 RX ORDER — CARVEDILOL 6.25 MG/1
6.25 TABLET ORAL 2 TIMES DAILY WITH MEALS
Qty: 180 TABLET | Refills: 0 | Status: SHIPPED | OUTPATIENT
Start: 2023-08-30 | End: 2024-01-22

## 2023-10-24 ENCOUNTER — LAB (OUTPATIENT)
Dept: LAB | Facility: CLINIC | Age: 73
End: 2023-10-24
Payer: MEDICARE

## 2023-10-24 PROCEDURE — 82274 ASSAY TEST FOR BLOOD FECAL: CPT | Performed by: PHYSICIAN ASSISTANT

## 2023-10-26 LAB — HEMOCCULT STL QL IA: NEGATIVE

## 2023-11-17 ENCOUNTER — TELEPHONE (OUTPATIENT)
Dept: FAMILY MEDICINE | Facility: CLINIC | Age: 73
End: 2023-11-17
Payer: MEDICARE

## 2023-11-17 NOTE — TELEPHONE ENCOUNTER
OK to take any open slot as there should be options available in December.     Gregoria Kumari PA-C

## 2023-11-17 NOTE — TELEPHONE ENCOUNTER
Patient is needing to follow up on her lab work/diabetes etc  She is asking if there is an appointment sooner,  She had to reschedule her appointment for next week because of a conflict  and first available was February.    Please advise if you can work in patient     Delmy Kumar XRO/

## 2023-11-20 DIAGNOSIS — M47.26 OSTEOARTHRITIS OF SPINE WITH RADICULOPATHY, LUMBAR REGION: ICD-10-CM

## 2023-12-10 ASSESSMENT — ANXIETY QUESTIONNAIRES
4. TROUBLE RELAXING: NOT AT ALL
3. WORRYING TOO MUCH ABOUT DIFFERENT THINGS: NOT AT ALL
GAD7 TOTAL SCORE: 0
6. BECOMING EASILY ANNOYED OR IRRITABLE: NOT AT ALL
IF YOU CHECKED OFF ANY PROBLEMS ON THIS QUESTIONNAIRE, HOW DIFFICULT HAVE THESE PROBLEMS MADE IT FOR YOU TO DO YOUR WORK, TAKE CARE OF THINGS AT HOME, OR GET ALONG WITH OTHER PEOPLE: NOT DIFFICULT AT ALL
7. FEELING AFRAID AS IF SOMETHING AWFUL MIGHT HAPPEN: NOT AT ALL
2. NOT BEING ABLE TO STOP OR CONTROL WORRYING: NOT AT ALL
5. BEING SO RESTLESS THAT IT IS HARD TO SIT STILL: NOT AT ALL
GAD7 TOTAL SCORE: 0
1. FEELING NERVOUS, ANXIOUS, OR ON EDGE: NOT AT ALL

## 2023-12-10 ASSESSMENT — PATIENT HEALTH QUESTIONNAIRE - PHQ9
SUM OF ALL RESPONSES TO PHQ QUESTIONS 1-9: 0
10. IF YOU CHECKED OFF ANY PROBLEMS, HOW DIFFICULT HAVE THESE PROBLEMS MADE IT FOR YOU TO DO YOUR WORK, TAKE CARE OF THINGS AT HOME, OR GET ALONG WITH OTHER PEOPLE: NOT DIFFICULT AT ALL
SUM OF ALL RESPONSES TO PHQ QUESTIONS 1-9: 0

## 2023-12-11 ENCOUNTER — OFFICE VISIT (OUTPATIENT)
Dept: FAMILY MEDICINE | Facility: CLINIC | Age: 73
End: 2023-12-11
Payer: MEDICARE

## 2023-12-11 VITALS
BODY MASS INDEX: 38.24 KG/M2 | HEART RATE: 75 BPM | DIASTOLIC BLOOD PRESSURE: 62 MMHG | RESPIRATION RATE: 20 BRPM | SYSTOLIC BLOOD PRESSURE: 112 MMHG | WEIGHT: 224 LBS | OXYGEN SATURATION: 97 % | HEIGHT: 64 IN | TEMPERATURE: 96.7 F

## 2023-12-11 DIAGNOSIS — E66.01 MORBID OBESITY (H): ICD-10-CM

## 2023-12-11 DIAGNOSIS — I10 PRIMARY HYPERTENSION: ICD-10-CM

## 2023-12-11 DIAGNOSIS — R73.09 ELEVATED HEMOGLOBIN A1C: Primary | ICD-10-CM

## 2023-12-11 DIAGNOSIS — M47.26 OSTEOARTHRITIS OF SPINE WITH RADICULOPATHY, LUMBAR REGION: ICD-10-CM

## 2023-12-11 LAB
ANION GAP SERPL CALCULATED.3IONS-SCNC: 13 MMOL/L (ref 7–15)
BUN SERPL-MCNC: 13.3 MG/DL (ref 8–23)
CALCIUM SERPL-MCNC: 8.8 MG/DL (ref 8.8–10.2)
CHLORIDE SERPL-SCNC: 104 MMOL/L (ref 98–107)
CREAT SERPL-MCNC: 1.06 MG/DL (ref 0.51–0.95)
DEPRECATED HCO3 PLAS-SCNC: 22 MMOL/L (ref 22–29)
EGFRCR SERPLBLD CKD-EPI 2021: 56 ML/MIN/1.73M2
GLUCOSE SERPL-MCNC: 119 MG/DL (ref 70–99)
HBA1C MFR BLD: 5.9 %
POTASSIUM SERPL-SCNC: 4.4 MMOL/L (ref 3.4–5.3)
SODIUM SERPL-SCNC: 139 MMOL/L (ref 135–145)

## 2023-12-11 PROCEDURE — 99214 OFFICE O/P EST MOD 30 MIN: CPT | Performed by: PHYSICIAN ASSISTANT

## 2023-12-11 PROCEDURE — 83036 HEMOGLOBIN GLYCOSYLATED A1C: CPT | Performed by: PHYSICIAN ASSISTANT

## 2023-12-11 PROCEDURE — 80048 BASIC METABOLIC PNL TOTAL CA: CPT | Performed by: PHYSICIAN ASSISTANT

## 2023-12-11 PROCEDURE — 36415 COLL VENOUS BLD VENIPUNCTURE: CPT | Performed by: PHYSICIAN ASSISTANT

## 2023-12-11 RX ORDER — HYDROCODONE BITARTRATE AND ACETAMINOPHEN 5; 325 MG/1; MG/1
1 TABLET ORAL EVERY 6 HOURS PRN
Qty: 30 TABLET | Refills: 0 | Status: SHIPPED | OUTPATIENT
Start: 2023-12-11 | End: 2024-02-19

## 2023-12-11 NOTE — PROGRESS NOTES
"  Lucía Asher is a 73 year old, presenting for the following health issues:  History of Present Illness      12/11/2023    10:59 AM   Additional Questions   Roomed by Veda       History of Present Illness       Diabetes:   She presents for follow up of diabetes.    She is not checking blood glucose.        She is concerned about other.   She is having weight gain.            She eats 0-1 servings of fruits and vegetables daily.She consumes 0 sweetened beverage(s) daily.She exercises with enough effort to increase her heart rate 9 or less minutes per day.  She exercises with enough effort to increase her heart rate 3 or less days per week.   She is taking medications regularly.     Patient presents today for follow-up of labs. She has been pre-diabetic for many years. Continues with labs every 6 months for monitoring. Typically just eats 1 meal per day and then snacking. Tries to move her body as much as able but sometimes pain becomes harder.     Has cut back her Paxil to 5 mg daily. Wonders if she can stop at this time. Was started on this due to menopause symptoms several years ago. Would like to get off this in case it is hindering with weight.     Patient presents today for follow-up of blood pressure. Numbers have been well controlled. Tolerating medications well without side effects. Monitoring salt in diet. Patient denies headaches, vision changes, chest pain, shortness of breath or paresthesias.    Scabbed area on right arm - consistent with SK.     Review of Systems   Constitutional, HEENT, cardiovascular, pulmonary, GI, , musculoskeletal, neuro, skin, endocrine and psych systems are negative, except as otherwise noted.      Objective    /62   Pulse 75   Temp (!) 96.7  F (35.9  C) (Temporal)   Resp 20   Ht 1.626 m (5' 4\")   Wt 101.6 kg (224 lb)   SpO2 97%   BMI 38.45 kg/m    Body mass index is 38.45 kg/m .  Physical Exam   GENERAL: healthy, alert and no distress  HENT: ear canals " and TM's normal, nose and mouth without ulcers or lesions  NECK: no adenopathy, no asymmetry, masses, or scars and thyroid normal to palpation  RESP: lungs clear to auscultation - no rales, rhonchi or wheezes  CV: regular rate and rhythm, normal S1 S2, no S3 or S4, no murmur, click or rub, no peripheral edema and peripheral pulses strong  MS: no gross musculoskeletal defects noted, no edema  SKIN: no suspicious lesions or rashes  PSYCH: mentation appears normal, affect normal/bright        Assessment & Plan     Elevated hemoglobin A1c  Labs updated today. Encouraged ongoing diet and exercise modifications.   - Hemoglobin A1c; Future  - Hemoglobin A1c    Primary hypertension  Stable.   - Basic metabolic panel  (Ca, Cl, CO2, Creat, Gluc, K, Na, BUN); Future  - Basic metabolic panel  (Ca, Cl, CO2, Creat, Gluc, K, Na, BUN)    Osteoarthritis of spine with radiculopathy, lumbar region  Discussed using only as needed for severe pain.   - HYDROcodone-acetaminophen (NORCO) 5-325 MG tablet; Take 1 tablet by mouth every 6 hours as needed for severe pain    Obesity (BMI 35.0-39.9) with comorbidity (H)  Encouraged ongoing diet and exercise modifications.     The patient indicates understanding of these issues and agrees with the plan.    MAYE Martinez [facility]

## 2023-12-14 NOTE — RESULT ENCOUNTER NOTE
Please notify patient/parent of result as [employer] message not read.    Gregoria Kumari PA-C

## 2024-01-22 DIAGNOSIS — K21.9 CHRONIC GERD: ICD-10-CM

## 2024-01-22 RX ORDER — PANTOPRAZOLE SODIUM 40 MG/1
40 TABLET, DELAYED RELEASE ORAL DAILY
Qty: 90 TABLET | Refills: 1 | Status: SHIPPED | OUTPATIENT
Start: 2024-01-22 | End: 2024-01-22

## 2024-02-12 ENCOUNTER — OFFICE VISIT (OUTPATIENT)
Dept: FAMILY MEDICINE | Facility: CLINIC | Age: 74
End: 2024-02-12
Payer: MEDICARE

## 2024-02-12 VITALS
OXYGEN SATURATION: 98 % | HEART RATE: 72 BPM | BODY MASS INDEX: 38.54 KG/M2 | WEIGHT: 224.5 LBS | DIASTOLIC BLOOD PRESSURE: 70 MMHG | SYSTOLIC BLOOD PRESSURE: 132 MMHG | RESPIRATION RATE: 18 BRPM | TEMPERATURE: 98.6 F

## 2024-02-12 DIAGNOSIS — R10.9 FLANK PAIN: Primary | ICD-10-CM

## 2024-02-12 DIAGNOSIS — E66.01 MORBID OBESITY (H): ICD-10-CM

## 2024-02-12 LAB
ALBUMIN UR-MCNC: NEGATIVE MG/DL
ANION GAP SERPL CALCULATED.3IONS-SCNC: 13 MMOL/L (ref 7–15)
APPEARANCE UR: CLEAR
BILIRUB UR QL STRIP: NEGATIVE
BUN SERPL-MCNC: 13.8 MG/DL (ref 8–23)
CALCIUM SERPL-MCNC: 9.2 MG/DL (ref 8.8–10.2)
CHLORIDE SERPL-SCNC: 102 MMOL/L (ref 98–107)
COLOR UR AUTO: YELLOW
CREAT SERPL-MCNC: 1.04 MG/DL (ref 0.51–0.95)
DEPRECATED HCO3 PLAS-SCNC: 25 MMOL/L (ref 22–29)
EGFRCR SERPLBLD CKD-EPI 2021: 57 ML/MIN/1.73M2
ERYTHROCYTE [DISTWIDTH] IN BLOOD BY AUTOMATED COUNT: 14.3 % (ref 10–15)
GLUCOSE SERPL-MCNC: 122 MG/DL (ref 70–99)
GLUCOSE UR STRIP-MCNC: NEGATIVE MG/DL
HCT VFR BLD AUTO: 42.6 % (ref 35–47)
HGB BLD-MCNC: 13.7 G/DL (ref 11.7–15.7)
HGB UR QL STRIP: ABNORMAL
KETONES UR STRIP-MCNC: NEGATIVE MG/DL
LEUKOCYTE ESTERASE UR QL STRIP: NEGATIVE
MCH RBC QN AUTO: 27.8 PG (ref 26.5–33)
MCHC RBC AUTO-ENTMCNC: 32.2 G/DL (ref 31.5–36.5)
MCV RBC AUTO: 87 FL (ref 78–100)
NITRATE UR QL: NEGATIVE
PH UR STRIP: 6 [PH] (ref 5–7)
PLATELET # BLD AUTO: 289 10E3/UL (ref 150–450)
POTASSIUM SERPL-SCNC: 4.3 MMOL/L (ref 3.4–5.3)
RBC # BLD AUTO: 4.92 10E6/UL (ref 3.8–5.2)
RBC URINE: 1 /HPF
SODIUM SERPL-SCNC: 140 MMOL/L (ref 135–145)
SP GR UR STRIP: 1.01 (ref 1–1.03)
SQUAMOUS EPITHELIAL: 2 /HPF
UROBILINOGEN UR STRIP-MCNC: NORMAL MG/DL
WBC # BLD AUTO: 7.1 10E3/UL (ref 4–11)
WBC URINE: 1 /HPF

## 2024-02-12 PROCEDURE — 80048 BASIC METABOLIC PNL TOTAL CA: CPT | Performed by: PHYSICIAN ASSISTANT

## 2024-02-12 PROCEDURE — 85027 COMPLETE CBC AUTOMATED: CPT | Performed by: PHYSICIAN ASSISTANT

## 2024-02-12 PROCEDURE — 99213 OFFICE O/P EST LOW 20 MIN: CPT | Performed by: PHYSICIAN ASSISTANT

## 2024-02-12 PROCEDURE — 36415 COLL VENOUS BLD VENIPUNCTURE: CPT | Performed by: PHYSICIAN ASSISTANT

## 2024-02-12 PROCEDURE — 81001 URINALYSIS AUTO W/SCOPE: CPT | Performed by: PHYSICIAN ASSISTANT

## 2024-02-12 ASSESSMENT — ENCOUNTER SYMPTOMS: BACK PAIN: 1

## 2024-02-12 NOTE — LETTER

## 2024-02-12 NOTE — PROGRESS NOTES
Lucía Asher is a 73 year old, presenting for the following health issues:  Back Pain      2/12/2024    12:45 PM   Additional Questions   Roomed by Kianna KEY MA     Back Pain     History of Present Illness       Back Pain:  She presents for follow up of back pain. Patient's back pain is a recurring problem.  Location of back pain:  Right lower back, right middle of back, right hip and right side of waist  Description of back pain: dull ache and gnawing  Back pain spreads: right thigh    Since patient first noticed back pain, pain is: always present, but gets better and worse  Does back pain interfere with her job:  Not applicable       She eats 0-1 servings of fruits and vegetables daily.She consumes 1 sweetened beverage(s) daily.She exercises with enough effort to increase her heart rate 9 or less minutes per day.  She exercises with enough effort to increase her heart rate 3 or less days per week.   She is taking medications regularly.     Patient reports last week she was having intense flank pain on the right side. It then felt like something ruptured and was having waves of spasms. This has since resolved but does still have discomfort in the same area. She notes that in the morning she feels flushed before urination and then symptoms resolve. She does have history of kidney stones but this feels different.  recently diagnosed with bladder cancer.       Review of Systems  Constitutional, neuro, ENT, endocrine, pulmonary, cardiac, gastrointestinal, genitourinary, musculoskeletal, integument and psychiatric systems are negative, except as otherwise noted.      Objective    /70   Pulse 72   Temp 98.6  F (37  C) (Temporal)   Resp 18   Wt 101.8 kg (224 lb 8 oz)   SpO2 98%   BMI 38.54 kg/m    Body mass index is 38.54 kg/m .  Physical Exam   GENERAL: alert and no distress  HENT: ear canals and TM's normal, nose and mouth without ulcers or lesions  RESP: lungs clear to auscultation - no  rales, rhonchi or wheezes  CV: regular rate and rhythm, normal S1 S2, no S3 or S4, no murmur, click or rub, no peripheral edema   MS: mild right sided flank pain  SKIN: no suspicious lesions or rashes  PSYCH: mentation appears normal, affect normal/bright        Assessment & Plan     Flank pain  Orders for labs and CT imaging placed today. Encouraged pushing fluids. Treatment and follow-up pending these results.   - CBC with platelets; Future  - Basic metabolic panel  (Ca, Cl, CO2, Creat, Gluc, K, Na, BUN); Future  - UA Macroscopic with reflex to Microscopic and Culture - Lab Collect; Future  - CT Abdomen w/o Contrast; Future  - CBC with platelets  - Basic metabolic panel  (Ca, Cl, CO2, Creat, Gluc, K, Na, BUN)  - UA Macroscopic with reflex to Microscopic and Culture - Lab Collect    Morbid obesity (H)  Encouraged ongoing diet and exercise modifications as able.     The patient indicates understanding of these issues and agrees with the plan.    Signed Electronically by: Gregoria Kumari PA-C

## 2024-02-13 ENCOUNTER — HOSPITAL ENCOUNTER (OUTPATIENT)
Dept: CT IMAGING | Facility: CLINIC | Age: 74
Discharge: HOME | End: 2024-02-13
Attending: PHYSICIAN ASSISTANT | Admitting: PHYSICIAN ASSISTANT
Payer: MEDICARE

## 2024-02-13 DIAGNOSIS — R10.9 FLANK PAIN: ICD-10-CM

## 2024-02-13 PROCEDURE — 74176 CT ABD & PELVIS W/O CONTRAST: CPT | Mod: MG

## 2024-02-13 NOTE — RESULT ENCOUNTER NOTE
Called and shared results with patient. Do suspect her symptoms to be related to gallstones. At this time she is feeling well. If she has recurrance will schedule consult with general surgery.     Gregoria Kumari PA-C

## 2024-02-14 ENCOUNTER — PATIENT OUTREACH (OUTPATIENT)
Dept: CARE COORDINATION | Facility: CLINIC | Age: 74
End: 2024-02-14
Payer: MEDICARE

## 2024-02-19 ENCOUNTER — MYC REFILL (OUTPATIENT)
Dept: FAMILY MEDICINE | Facility: CLINIC | Age: 74
End: 2024-02-19
Payer: MEDICARE

## 2024-02-19 DIAGNOSIS — M47.26 OSTEOARTHRITIS OF SPINE WITH RADICULOPATHY, LUMBAR REGION: ICD-10-CM

## 2024-02-20 ENCOUNTER — OFFICE VISIT (OUTPATIENT)
Dept: OPTOMETRY | Facility: CLINIC | Age: 74
End: 2024-02-20
Attending: PHYSICIAN ASSISTANT
Payer: MEDICARE

## 2024-02-20 DIAGNOSIS — R73.03 PRE-DIABETES: Primary | ICD-10-CM

## 2024-02-20 DIAGNOSIS — H52.223 REGULAR ASTIGMATISM OF BOTH EYES: ICD-10-CM

## 2024-02-20 DIAGNOSIS — H52.12 MYOPIA, LEFT: ICD-10-CM

## 2024-02-20 DIAGNOSIS — H52.4 PRESBYOPIA: ICD-10-CM

## 2024-02-20 DIAGNOSIS — H04.123 DRY EYES: ICD-10-CM

## 2024-02-20 DIAGNOSIS — Z96.1 PSEUDOPHAKIA OF BOTH EYES: ICD-10-CM

## 2024-02-20 PROCEDURE — 92004 COMPRE OPH EXAM NEW PT 1/>: CPT | Performed by: OPTOMETRIST

## 2024-02-20 PROCEDURE — 92015 DETERMINE REFRACTIVE STATE: CPT | Mod: GY | Performed by: OPTOMETRIST

## 2024-02-20 RX ORDER — HYDROCODONE BITARTRATE AND ACETAMINOPHEN 5; 325 MG/1; MG/1
1 TABLET ORAL EVERY 6 HOURS PRN
Qty: 30 TABLET | Refills: 0 | Status: SHIPPED | OUTPATIENT
Start: 2024-02-20 | End: 2024-04-01

## 2024-02-20 ASSESSMENT — REFRACTION_MANIFEST
OD_AXIS: 180
OS_SPHERE: -1.00
OS_SPHERE: -1.25
OS_ADD: +2.50
OD_SPHERE: -0.75
OD_AXIS: 001
OD_CYLINDER: +1.00
OD_ADD: +2.50
OS_CYLINDER: +0.25
OD_CYLINDER: +1.00
METHOD_AUTOREFRACTION: 1
OS_AXIS: 180
OS_CYLINDER: +0.50
OD_SPHERE: -1.00
OS_AXIS: 179

## 2024-02-20 ASSESSMENT — CONF VISUAL FIELD
OS_INFERIOR_TEMPORAL_RESTRICTION: 0
OS_NORMAL: 1
OS_INFERIOR_NASAL_RESTRICTION: 0
OS_SUPERIOR_TEMPORAL_RESTRICTION: 0
OD_SUPERIOR_TEMPORAL_RESTRICTION: 0
OS_SUPERIOR_NASAL_RESTRICTION: 0
OD_INFERIOR_NASAL_RESTRICTION: 0
OD_NORMAL: 1
OD_INFERIOR_TEMPORAL_RESTRICTION: 0
OD_SUPERIOR_NASAL_RESTRICTION: 0

## 2024-02-20 ASSESSMENT — VISUAL ACUITY
CORRECTION_TYPE: GLASSES
OS_CC: 20/40
OS_CC: 20/20
OD_CC: 20/30
METHOD: SNELLEN - LINEAR
OD_CC: 20/20
OS_CC+: -1

## 2024-02-20 ASSESSMENT — REFRACTION_WEARINGRX
OD_AXIS: 005
OS_AXIS: 040
OS_CYLINDER: +0.50
SPECS_TYPE: PAL
OD_ADD: +2.50
OD_SPHERE: -0.75
OS_SPHERE: -1.00
OS_ADD: +2.50
OD_CYLINDER: +0.75

## 2024-02-20 ASSESSMENT — KERATOMETRY
OS_K1POWER_DIOPTERS: 46.00
OS_AXISANGLE2_DEGREES: 174
OD_AXISANGLE2_DEGREES: 11
OD_K1POWER_DIOPTERS: 46.00
OS_K2POWER_DIOPTERS: 45.75
OD_K2POWER_DIOPTERS: 45.25
OD_AXISANGLE_DEGREES: 101
OS_AXISANGLE_DEGREES: 84

## 2024-02-20 ASSESSMENT — SLIT LAMP EXAM - LIDS
COMMENTS: NORMAL
COMMENTS: NORMAL

## 2024-02-20 ASSESSMENT — TONOMETRY
OD_IOP_MMHG: 13
OS_IOP_MMHG: 13
IOP_METHOD: APPLANATION

## 2024-02-20 ASSESSMENT — CUP TO DISC RATIO
OS_RATIO: 0.3
OD_RATIO: 0.3

## 2024-02-20 NOTE — PROGRESS NOTES
Chief Complaint   Patient presents with    Diabetic Eye Exam     Prediabetic- Taking Metformin and using diet controlled. Not checking blood sugars     Accompanied by Cruzito,    Chief Complaint(s) and History of Present Illness(es)       Diabetic Eye Exam              Comments: Prediabetic- Taking Metformin and using diet controlled. Not checking blood sugars                   Lab Results   Component Value Date    A1C 5.9 12/11/2023    A1C 6.0 03/15/2023    A1C 5.9 10/14/2022    A1C 6.2 01/21/2022    A1C 5.9 07/30/2021    A1C 5.9 01/06/2021    A1C 5.8 07/08/2020    A1C 5.9 11/20/2019    A1C 6.0 07/09/2019    A1C 5.9 03/06/2019            Last Eye Exam: 2-  Dilated Previously: Yes    What are you currently using to see?  glasses    Distance Vision Acuity: Noticed gradual change in both eyes    Notices that when looking at phone and looking up in distance, it takes a while to focus. Takes 3-5 minutes before getting back into focus.    Near Vision Acuity: Satisfied with vision while reading and using computer with glasses    Eye Comfort: tired feeling as well as dry , fan in bedroom used for white noise , has trouble sleeping   Do you use eye drops? : Yes: Generic AT (this stings upon insertion)  and Lumify  Occupation or Hobbies: paint, adult coloring books, summer gardening     Eduardo Bermudez - Optometric Assistant     Medical, surgical and family histories reviewed and updated 2/20/2024.       OBJECTIVE: See Ophthalmology exam    ASSESSMENT:    ICD-10-CM    1. Pre-diabetes  R73.03     no diabetic retinopathy found at dilated eye exam      2. Pseudophakia of both eyes  Z96.1       3. Dry eyes  H04.123       4. Regular astigmatism of both eyes  H52.223       5. Myopia, left  H52.12       6. Presbyopia  H52.4           PLAN:    Shelly Carcamo aware  eye exam results will be sent to Gregoria Kumari to avoid fans in bedrooms   Patient Instructions   Fill glasses prescription  Allow 2 weeks to adapt  to change in glasses  Use over the counter artificial tears 2 -3 times a day ( Thera Tears, Systane Ultra or Refresh Relieva  )     Use Lumify when your eyes look red     Keep blood sugar under good control  Return to clinic 1 year for diabetic eye exam .      Blood sugar and blood pressure control are very important in the prevention of ocular complications from diabetes.       Denisse Rowell, OD  [phone]

## 2024-02-20 NOTE — LETTER
2/20/2024         RE: Shelly Carcamo  [address] [facility] [address]        Dear Colleague,    Thank you for referring your patient, Shelly Carcamo, to the [facility]. No diabetic retinopathy was found at eye exam. Please see a copy of my visit note below.    Chief Complaint   Patient presents with     Diabetic Eye Exam     Prediabetic- Taking Metformin and using diet controlled. Not checking blood sugars     Accompanied by Cruzito,    Chief Complaint(s) and History of Present Illness(es)       Diabetic Eye Exam              Comments: Prediabetic- Taking Metformin and using diet controlled. Not checking blood sugars                   Lab Results   Component Value Date    A1C 5.9 12/11/2023    A1C 6.0 03/15/2023    A1C 5.9 10/14/2022    A1C 6.2 01/21/2022    A1C 5.9 07/30/2021    A1C 5.9 01/06/2021    A1C 5.8 07/08/2020    A1C 5.9 11/20/2019    A1C 6.0 07/09/2019    A1C 5.9 03/06/2019            Last Eye Exam: 2-  Dilated Previously: Yes    What are you currently using to see?  glasses    Distance Vision Acuity: Noticed gradual change in both eyes    Notices that when looking at phone and looking up in distance, it takes a while to focus. Takes 3-5 minutes before getting back into focus.    Near Vision Acuity: Satisfied with vision while reading and using computer with glasses    Eye Comfort: tired feeling as well as dry , fan in bedroom used for white noise , has trouble sleeping   Do you use eye drops? : Yes: Generic AT (this stings upon insertion)  and Lumify  Occupation or Hobbies: paint, adult coloring books, summer gardening     Eudardo Bermudez - Optometric Assistant     Medical, surgical and family histories reviewed and updated 2/20/2024.       OBJECTIVE: See Ophthalmology exam    ASSESSMENT:    ICD-10-CM    1. Pre-diabetes  R73.03     no diabetic retinopathy found at dilated eye exam      2. Pseudophakia of both eyes  Z96.1       3. Dry eyes  H04.123       4.  Regular astigmatism of both eyes  H52.223       5. Myopia, left  H52.12       6. Presbyopia  H52.4           PLAN:    Shelly Carcamo aware  eye exam results will be sent to Gregoria Kumari  Advised to avoid fans in bedrooms   Patient Instructions   Fill glasses prescription  Allow 2 weeks to adapt to change in glasses  Use over the counter artificial tears 2 -3 times a day ( Thera Tears, Systane Ultra or Refresh Relieva  )     Use Lumify when your eyes look red     Keep blood sugar under good control  Return to clinic 1 year for diabetic eye exam .      Blood sugar and blood pressure control are very important in the prevention of ocular complications from diabetes.       Denisse Rowell, OD  [phone]                      Again, thank you for allowing me to participate in the care of your patient.        Sincerely,        Denisse Rowell, OD

## 2024-02-20 NOTE — PATIENT INSTRUCTIONS
Fill glasses prescription  Allow 2 weeks to adapt to change in glasses  Use over the counter artificial tears 2 -3 times a day ( Thera Tears, Systane Ultra or Refresh Relieva  )     Use Lumify when your eyes look red     Keep blood sugar under good control  Return to clinic 1 year for diabetic eye exam .      Blood sugar and blood pressure control are very important in the prevention of ocular complications from diabetes.       Denisse Rowell, OD  [phone]

## 2024-02-28 ENCOUNTER — PATIENT OUTREACH (OUTPATIENT)
Dept: CARE COORDINATION | Facility: CLINIC | Age: 74
End: 2024-02-28
Payer: MEDICARE

## 2024-04-01 ENCOUNTER — MYC REFILL (OUTPATIENT)
Dept: FAMILY MEDICINE | Facility: CLINIC | Age: 74
End: 2024-04-01
Payer: MEDICARE

## 2024-04-01 DIAGNOSIS — M47.26 OSTEOARTHRITIS OF SPINE WITH RADICULOPATHY, LUMBAR REGION: ICD-10-CM

## 2024-04-02 RX ORDER — HYDROCODONE BITARTRATE AND ACETAMINOPHEN 5; 325 MG/1; MG/1
1 TABLET ORAL EVERY 6 HOURS PRN
Qty: 30 TABLET | Refills: 0 | Status: SHIPPED | OUTPATIENT
Start: 2024-04-02 | End: 2024-05-03

## 2024-04-04 ENCOUNTER — HOSPITAL ENCOUNTER (OUTPATIENT)
Dept: MAMMOGRAPHY | Facility: CLINIC | Age: 74
Discharge: HOME | End: 2024-04-04
Attending: PHYSICIAN ASSISTANT | Admitting: PHYSICIAN ASSISTANT
Payer: MEDICARE

## 2024-04-04 DIAGNOSIS — Z12.31 VISIT FOR SCREENING MAMMOGRAM: ICD-10-CM

## 2024-04-04 PROCEDURE — 77063 BREAST TOMOSYNTHESIS BI: CPT

## 2024-04-08 ENCOUNTER — HOSPITAL ENCOUNTER (EMERGENCY)
Facility: CLINIC | Age: 74
Discharge: HOME | End: 2024-04-08
Attending: EMERGENCY MEDICINE | Admitting: EMERGENCY MEDICINE
Payer: MEDICARE

## 2024-04-08 ENCOUNTER — APPOINTMENT (OUTPATIENT)
Dept: GENERAL RADIOLOGY | Facility: CLINIC | Age: 74
End: 2024-04-08
Attending: EMERGENCY MEDICINE
Payer: MEDICARE

## 2024-04-08 ENCOUNTER — APPOINTMENT (OUTPATIENT)
Dept: CT IMAGING | Facility: CLINIC | Age: 74
End: 2024-04-08
Attending: EMERGENCY MEDICINE
Payer: MEDICARE

## 2024-04-08 VITALS
TEMPERATURE: 98 F | BODY MASS INDEX: 38.45 KG/M2 | OXYGEN SATURATION: 95 % | WEIGHT: 224 LBS | RESPIRATION RATE: 15 BRPM | HEART RATE: 82 BPM | SYSTOLIC BLOOD PRESSURE: 158 MMHG | DIASTOLIC BLOOD PRESSURE: 76 MMHG

## 2024-04-08 DIAGNOSIS — R19.7 DIARRHEA, UNSPECIFIED TYPE: ICD-10-CM

## 2024-04-08 DIAGNOSIS — R03.0 ELEVATED BLOOD PRESSURE READING: ICD-10-CM

## 2024-04-08 DIAGNOSIS — E04.1 THYROID NODULE: ICD-10-CM

## 2024-04-08 DIAGNOSIS — R91.1 PULMONARY NODULE: ICD-10-CM

## 2024-04-08 DIAGNOSIS — R07.89 CHEST DISCOMFORT: ICD-10-CM

## 2024-04-08 LAB
ALBUMIN SERPL BCG-MCNC: 4.3 G/DL (ref 3.5–5.2)
ALP SERPL-CCNC: 53 U/L (ref 40–150)
ALT SERPL W P-5'-P-CCNC: 32 U/L (ref 0–50)
ANION GAP SERPL CALCULATED.3IONS-SCNC: 11 MMOL/L (ref 7–15)
AST SERPL W P-5'-P-CCNC: 24 U/L (ref 0–45)
BASOPHILS # BLD AUTO: 0.1 10E3/UL (ref 0–0.2)
BASOPHILS NFR BLD AUTO: 1 %
BILIRUB SERPL-MCNC: 0.4 MG/DL
BUN SERPL-MCNC: 19.1 MG/DL (ref 8–23)
CALCIUM SERPL-MCNC: 9.1 MG/DL (ref 8.8–10.2)
CHLORIDE SERPL-SCNC: 102 MMOL/L (ref 98–107)
CREAT SERPL-MCNC: 1.02 MG/DL (ref 0.51–0.95)
D DIMER PPP FEU-MCNC: 0.59 UG/ML FEU (ref 0–0.5)
DEPRECATED HCO3 PLAS-SCNC: 25 MMOL/L (ref 22–29)
EGFRCR SERPLBLD CKD-EPI 2021: 58 ML/MIN/1.73M2
EOSINOPHIL # BLD AUTO: 0.2 10E3/UL (ref 0–0.7)
EOSINOPHIL NFR BLD AUTO: 2 %
ERYTHROCYTE [DISTWIDTH] IN BLOOD BY AUTOMATED COUNT: 14.3 % (ref 10–15)
GLUCOSE SERPL-MCNC: 137 MG/DL (ref 70–99)
HCT VFR BLD AUTO: 41.5 % (ref 35–47)
HGB BLD-MCNC: 13.5 G/DL (ref 11.7–15.7)
IMM GRANULOCYTES # BLD: 0 10E3/UL
IMM GRANULOCYTES NFR BLD: 0 %
LYMPHOCYTES # BLD AUTO: 2.1 10E3/UL (ref 0.8–5.3)
LYMPHOCYTES NFR BLD AUTO: 23 %
MAGNESIUM SERPL-MCNC: 1.9 MG/DL (ref 1.7–2.3)
MCH RBC QN AUTO: 27.7 PG (ref 26.5–33)
MCHC RBC AUTO-ENTMCNC: 32.5 G/DL (ref 31.5–36.5)
MCV RBC AUTO: 85 FL (ref 78–100)
MONOCYTES # BLD AUTO: 0.7 10E3/UL (ref 0–1.3)
MONOCYTES NFR BLD AUTO: 8 %
NEUTROPHILS # BLD AUTO: 6 10E3/UL (ref 1.6–8.3)
NEUTROPHILS NFR BLD AUTO: 66 %
NRBC # BLD AUTO: 0 10E3/UL
NRBC BLD AUTO-RTO: 0 /100
PLATELET # BLD AUTO: 304 10E3/UL (ref 150–450)
POTASSIUM SERPL-SCNC: 4 MMOL/L (ref 3.4–5.3)
PROT SERPL-MCNC: 7.8 G/DL (ref 6.4–8.3)
RADIOLOGIST FLAGS: NORMAL
RBC # BLD AUTO: 4.87 10E6/UL (ref 3.8–5.2)
SODIUM SERPL-SCNC: 138 MMOL/L (ref 135–145)
TROPONIN T SERPL HS-MCNC: 12 NG/L
WBC # BLD AUTO: 9 10E3/UL (ref 4–11)

## 2024-04-08 PROCEDURE — 250N000013 HC RX MED GY IP 250 OP 250 PS 637: Performed by: EMERGENCY MEDICINE

## 2024-04-08 PROCEDURE — 83735 ASSAY OF MAGNESIUM: CPT | Performed by: EMERGENCY MEDICINE

## 2024-04-08 PROCEDURE — 93010 ELECTROCARDIOGRAM REPORT: CPT | Performed by: EMERGENCY MEDICINE

## 2024-04-08 PROCEDURE — 74177 CT ABD & PELVIS W/CONTRAST: CPT | Mod: MG

## 2024-04-08 PROCEDURE — 80053 COMPREHEN METABOLIC PANEL: CPT | Performed by: EMERGENCY MEDICINE

## 2024-04-08 PROCEDURE — 258N000003 HC RX IP 258 OP 636: Performed by: EMERGENCY MEDICINE

## 2024-04-08 PROCEDURE — 96374 THER/PROPH/DIAG INJ IV PUSH: CPT | Mod: 59 | Performed by: EMERGENCY MEDICINE

## 2024-04-08 PROCEDURE — G1010 CDSM STANSON: HCPCS

## 2024-04-08 PROCEDURE — 250N000009 HC RX 250: Performed by: EMERGENCY MEDICINE

## 2024-04-08 PROCEDURE — 96361 HYDRATE IV INFUSION ADD-ON: CPT | Performed by: EMERGENCY MEDICINE

## 2024-04-08 PROCEDURE — 36415 COLL VENOUS BLD VENIPUNCTURE: CPT | Performed by: EMERGENCY MEDICINE

## 2024-04-08 PROCEDURE — 93005 ELECTROCARDIOGRAM TRACING: CPT | Performed by: EMERGENCY MEDICINE

## 2024-04-08 PROCEDURE — 84484 ASSAY OF TROPONIN QUANT: CPT | Performed by: EMERGENCY MEDICINE

## 2024-04-08 PROCEDURE — 99285 EMERGENCY DEPT VISIT HI MDM: CPT | Mod: 25 | Performed by: EMERGENCY MEDICINE

## 2024-04-08 PROCEDURE — 250N000011 HC RX IP 250 OP 636: Performed by: EMERGENCY MEDICINE

## 2024-04-08 PROCEDURE — 85025 COMPLETE CBC W/AUTO DIFF WBC: CPT | Performed by: EMERGENCY MEDICINE

## 2024-04-08 PROCEDURE — 99284 EMERGENCY DEPT VISIT MOD MDM: CPT | Mod: 25 | Performed by: EMERGENCY MEDICINE

## 2024-04-08 PROCEDURE — 85379 FIBRIN DEGRADATION QUANT: CPT | Performed by: EMERGENCY MEDICINE

## 2024-04-08 PROCEDURE — 71046 X-RAY EXAM CHEST 2 VIEWS: CPT

## 2024-04-08 RX ORDER — IOPAMIDOL 755 MG/ML
500 INJECTION, SOLUTION INTRAVASCULAR ONCE
Status: COMPLETED | OUTPATIENT
Start: 2024-04-08 | End: 2024-04-08

## 2024-04-08 RX ORDER — TRAMADOL HYDROCHLORIDE 50 MG/1
50 TABLET ORAL ONCE
Status: COMPLETED | OUTPATIENT
Start: 2024-04-08 | End: 2024-04-08

## 2024-04-08 RX ORDER — ONDANSETRON 2 MG/ML
4 INJECTION INTRAMUSCULAR; INTRAVENOUS EVERY 30 MIN PRN
Status: DISCONTINUED | OUTPATIENT
Start: 2024-04-08 | End: 2024-04-08 | Stop reason: HOSPADM

## 2024-04-08 RX ORDER — SODIUM CHLORIDE 9 MG/ML
INJECTION, SOLUTION INTRAVENOUS CONTINUOUS
Status: DISCONTINUED | OUTPATIENT
Start: 2024-04-08 | End: 2024-04-08 | Stop reason: HOSPADM

## 2024-04-08 RX ADMIN — SODIUM CHLORIDE 500 ML: 9 INJECTION, SOLUTION INTRAVENOUS at 19:12

## 2024-04-08 RX ADMIN — TRAMADOL HYDROCHLORIDE 50 MG: 50 TABLET, COATED ORAL at 20:54

## 2024-04-08 RX ADMIN — SODIUM CHLORIDE: 9 INJECTION, SOLUTION INTRAVENOUS at 18:09

## 2024-04-08 RX ADMIN — IOPAMIDOL 100 ML: 755 INJECTION, SOLUTION INTRAVENOUS at 18:56

## 2024-04-08 RX ADMIN — SODIUM CHLORIDE 70 ML: 9 INJECTION, SOLUTION INTRAVENOUS at 18:57

## 2024-04-08 RX ADMIN — ONDANSETRON 4 MG: 2 INJECTION INTRAMUSCULAR; INTRAVENOUS at 18:10

## 2024-04-08 ASSESSMENT — ACTIVITIES OF DAILY LIVING (ADL)
ADLS_ACUITY_SCORE: 35
ADLS_ACUITY_SCORE: 33
ADLS_ACUITY_SCORE: 35
ADLS_ACUITY_SCORE: 35

## 2024-04-08 ASSESSMENT — COLUMBIA-SUICIDE SEVERITY RATING SCALE - C-SSRS
2. HAVE YOU ACTUALLY HAD ANY THOUGHTS OF KILLING YOURSELF IN THE PAST MONTH?: NO
6. HAVE YOU EVER DONE ANYTHING, STARTED TO DO ANYTHING, OR PREPARED TO DO ANYTHING TO END YOUR LIFE?: NO
1. IN THE PAST MONTH, HAVE YOU WISHED YOU WERE DEAD OR WISHED YOU COULD GO TO SLEEP AND NOT WAKE UP?: NO

## 2024-04-08 NOTE — MEDICATION SCRIBE - ADMISSION MEDICATION HISTORY
Medication Scribe Admission Medication History    Admission medication history is complete. The information provided in this note is only as accurate as the sources available at the time of the update.    Information Source(s): Patient and CareEverywhere/SureScripts via in-person    Pertinent Information: n/a    Changes made to PTA medication list:  Added: B12 & Lysine  Deleted: None  Changed: None    Allergies reviewed with patient and updates made in EHR: yes    Medication History Completed By: SE GUEVARA 4/8/2024 6:35 PM    PTA Med List   Medication Sig Last Dose    atorvastatin (LIPITOR) 20 MG tablet Take 1 tablet (20 mg) by mouth daily 4/8/2024 at am    carvedilol (COREG) 6.25 MG tablet Take 1 tablet (6.25 mg) by mouth 2 times daily (with meals) 4/8/2024 at am    Coenzyme Q10 (COQ10 PO) 1 tablet daily Past Week at am    Cyanocobalamin (B-12 PO) Take 1 tablet by mouth daily 4/8/2024 at am    famotidine (PEPCID) 20 MG tablet TAKE 1 TABLET TWICE A DAY (Patient taking differently: Take 20 mg by mouth 2 times daily) 4/8/2024 at am    HYDROcodone-acetaminophen (NORCO) 5-325 MG tablet Take 1 tablet by mouth every 6 hours as needed for severe pain 4/7/2024 at hs    losartan (COZAAR) 50 MG tablet TAKE 2 TABLETS ONCE DAILY (Patient taking differently: Take 100 mg by mouth daily) 4/8/2024 at am    LYSINE PO Take 1 tablet by mouth 2 times daily 4/8/2024 at am    metFORMIN (GLUCOPHAGE XR) 500 MG 24 hr tablet Take 1 tablet (500 mg) by mouth daily (with dinner) 4/7/2024 at supper    pantoprazole (PROTONIX) 40 MG EC tablet Take 1 tablet (40 mg) by mouth daily 4/8/2024 at am    TURMERIC PO Take 1 capsule by mouth daily 4/8/2024 at am    Vitamin D, Cholecalciferol, 25 MCG (1000 UT) CAPS 1 capsule daily 4/8/2024 at am

## 2024-04-08 NOTE — ED PROVIDER NOTES
"  History     Chief Complaint   Patient presents with    Palpitations     HPI  History per patient, medical records    This is a 73-year-old female, history of type 2 diabetes, hypertension, hyperlipidemia, GERD, other history as below presenting with palpitations.  Patient was just sitting watching some TV with her  when she noted what she describes as \"electrical shock like\" feeling along her left upper anterior chest.  She has had about 5 or 6 episodes.  None of these have occurred during exertion.  She denies any shortness of breath or cough.  No skin changes.  She woke this morning with a lot of fullness/bloating sensation.  She has had numerous bowel movements today and now has diarrhea.  She feels nauseated.  She notes she has been struggling with diarrhea since starting metformin about 3 months ago.  Because she was feeling unwell she took her blood pressure at home which was elevated up to 170s systolic.  She usually runs around 130 systolic.  She did take her blood pressure medication today, Cozaar and Coreg.  She denies any recent illnesses, headache, sore throat, cold or cough symptoms, urinary symptoms.  She has had the sensation of \"something in her throat\" slightly today.  She is not a smoker.  Her mom has a history of heart disease and her dad had a valve replaced.  She denies any lower extremity edema, calf pain or swelling.  Of note, patient had some symptoms.  She then took her dog for a brisk walk without any increasing symptoms.  She noted the symptoms again while at rest.    Allergies:  No Known Allergies    Problem List:    Patient Active Problem List    Diagnosis Date Noted    Gastroesophageal reflux disease with esophagitis 02/10/2020     Priority: Medium    Controlled substance agreement signed 02/10/2020     Priority: Medium    Elevated hemoglobin A1c 02/10/2020     Priority: Medium    Obesity (BMI 35.0-39.9) with comorbidity (H) 01/29/2020     Priority: Medium    Vitamin D " deficiency 01/29/2020     Priority: Medium    DDD (degenerative disc disease), lumbar 01/29/2020     Priority: Medium    Osteoarthritis of spine with radiculopathy, lumbar region 01/29/2020     Priority: Medium    Psychophysiological insomnia 01/29/2020     Priority: Medium    High cholesterol 12/08/2015     Priority: Medium    HTN (hypertension) 12/08/2015     Priority: Medium    Vitamin B12 deficiency 12/08/2015     Priority: Medium        Past Medical History:    Past Medical History:   Diagnosis Date    Cataract     Chronic osteoarthritis     Gastroesophageal reflux disease     Hyperlipidemia     Hypertension        Past Surgical History:    Past Surgical History:   Procedure Laterality Date    BIOPSY  2018    Hysterectomy    CATARACT IOL, RT/LT Bilateral 2015    COLONOSCOPY  2016    Biopsy negitive    ENT SURGERY  1956    HYSTERECTOMY      HYSTERECTOMY, PAP NO LONGER INDICATED      ORTHOPEDIC SURGERY  2016    both wrist Carpal tunnel surgery       Family History:    Family History   Problem Relation Age of Onset    Pancreatic Cancer Mother     Hypertension Mother     Obesity Mother     ALS Father     Coronary Artery Disease Father         Valve replacement. Aortic valve calcification    Hyperlipidemia Father     Obesity Father     Obesity Maternal Grandmother     Glaucoma Maternal Grandfather     Atrial fibrillation Brother     Glaucoma Other     Coronary Artery Disease Brother         AFIB    Hypertension Brother     Hyperlipidemia Brother     Obesity Brother     Macular Degeneration No family hx of        Social History:  Marital Status:   [2]  Social History     Tobacco Use    Smoking status: Never    Smokeless tobacco: Never   Vaping Use    Vaping Use: Never used   Substance Use Topics    Alcohol use: Yes     Comment: socially on occasion    Drug use: Never        Medications:    atorvastatin (LIPITOR) 20 MG tablet  carvedilol (COREG) 6.25 MG tablet  Coenzyme Q10 (COQ10 PO)  Cyanocobalamin (B-12  PO)  famotidine (PEPCID) 20 MG tablet  HYDROcodone-acetaminophen (NORCO) 5-325 MG tablet  losartan (COZAAR) 50 MG tablet  LYSINE PO  metFORMIN (GLUCOPHAGE XR) 500 MG 24 hr tablet  pantoprazole (PROTONIX) 40 MG EC tablet  TURMERIC PO  Vitamin D, Cholecalciferol, 25 MCG (1000 UT) CAPS          Review of Systems  All other ROS reviewed and are negative or non-contributory except as stated in HPI.     Physical Exam   BP: (!) 197/109  Pulse: 94  Temp: 98  F (36.7  C)  Resp: 18  Weight: 101.6 kg (224 lb)  SpO2: 99 %      Physical Exam  Vitals and nursing note reviewed.   Constitutional:       Appearance: Normal appearance. She is obese.      Comments: Very pleasant, healthy-appearing female sitting in the bed   HENT:      Head: Normocephalic.      Nose: Nose normal.      Mouth/Throat:      Mouth: Mucous membranes are moist.      Pharynx: Oropharynx is clear.   Eyes:      General: No scleral icterus.     Extraocular Movements: Extraocular movements intact.      Conjunctiva/sclera: Conjunctivae normal.   Cardiovascular:      Rate and Rhythm: Normal rate and regular rhythm.      Pulses: Normal pulses.      Heart sounds: Normal heart sounds.   Pulmonary:      Effort: Pulmonary effort is normal.      Breath sounds: Normal breath sounds.   Abdominal:      Palpations: Abdomen is soft.      Tenderness: There is no abdominal tenderness.      Comments: Hyperactive bowel sounds   Musculoskeletal:         General: No tenderness. Normal range of motion.      Cervical back: Normal range of motion and neck supple.      Right lower leg: No edema.      Left lower leg: No edema.   Skin:     General: Skin is warm and dry.      Comments: No obvious anterior chest wall skin changes   Neurological:      General: No focal deficit present.      Mental Status: She is alert and oriented to person, place, and time.   Psychiatric:         Mood and Affect: Mood normal.         Behavior: Behavior normal.         ED Course (with Medical Decision  Making)    Pt seen and examined by me.  RN and EPIC notes reviewed.       Patient with some chest sensations that are unusual for her.  She describes electrical shocklike sensations to the anterior left upper chest about 5 or 6 times.  She also has elevated blood pressures.  Diarrhea today.    EKG was done.  This showed sinus rhythm with a rate of 92.  No ectopy.  No obvious ST segment elevation.  Read by myself at 5:11 PM.    CBC is normal.  Troponin normal.  D-dimer is normal for age.  CBC normal.    I discussed the results at length with the patient.  She noted a couple of little episodes even while I was in the room but I did not notice any obvious ectopy or other abnormalities on the monitor.  We talked at length and elected to do a CT scan of the chest and as she has been having diarrhea symptoms I added the abdomen.    CT scan shows no acute findings in the chest, abdomen or pelvis.  She has incidental finding of a right thyroid nodule and an incidental left upper lobe 0.7 cm nodule.  Patient is low risk.    Findings discussed with the patient.  I am wondering if she could benefit from a stress test or Zio patch.  I am going to defer to her primary care provider.  I will send her a message.  She will need follow-up for the thyroid and the lung findings.    All the results and plans were discussed at length with the patient and her .  She was reassured.  Her blood pressures have improved.  I recommend continuing to monitor.  If it anytime she notes any significant worsening, changes or concerns return promptly to the ED at any time.        Procedures  Results for orders placed or performed during the hospital encounter of 04/08/24 (from the past 24 hour(s))   CBC with platelets differential    Narrative    The following orders were created for panel order CBC with platelets differential.  Procedure                               Abnormality         Status                     ---------                                -----------         ------                     CBC with platelets and d...[[id number]]                      Final result                 Please view results for these tests on the individual orders.   Comprehensive metabolic panel   Result Value Ref Range    Sodium 138 135 - 145 mmol/L    Potassium 4.0 3.4 - 5.3 mmol/L    Carbon Dioxide (CO2) 25 22 - 29 mmol/L    Anion Gap 11 7 - 15 mmol/L    Urea Nitrogen 19.1 8.0 - 23.0 mg/dL    Creatinine 1.02 (H) 0.51 - 0.95 mg/dL    GFR Estimate 58 (L) >60 mL/min/1.73m2    Calcium 9.1 8.8 - 10.2 mg/dL    Chloride 102 98 - 107 mmol/L    Glucose 137 (H) 70 - 99 mg/dL    Alkaline Phosphatase 53 40 - 150 U/L    AST 24 0 - 45 U/L    ALT 32 0 - 50 U/L    Protein Total 7.8 6.4 - 8.3 g/dL    Albumin 4.3 3.5 - 5.2 g/dL    Bilirubin Total 0.4 <=1.2 mg/dL   Troponin T, High Sensitivity   Result Value Ref Range    Troponin T, High Sensitivity 12 <=14 ng/L   D dimer quantitative   Result Value Ref Range    D-Dimer Quantitative 0.59 (H) 0.00 - 0.50 ug/mL FEU    Narrative    This D-dimer assay is intended for use in conjunction with a clinical pretest probability assessment model to exclude pulmonary embolism (PE) and deep venous thrombosis (DVT) in outpatients suspected of PE or DVT. The cut-off value is 0.50 ug/mL FEU.    For patients 50 years of age or older, the application of age-adjusted cut-off values for D-Dimer may increase the specificity without significant effect on sensitivity. The literature suggested calculation age adjusted cut-off in ug/L = age in years x 10 ug/L. The results in this laboratory are reported as ug/mL rather than ug/L. The calculation for age adjusted cut off in ug/mL= age in years x 0.01 ug/mL. For example, the cut off for a 76 year old male is 77 x 0.01 ug/mL = 0.76 ug/mL (760 ug/L).    M Faiza et al. Age adjusted D-dimer cut-off levels to rule out pulmonary embolism: The ADJUST-PE Study. JAIRO 2014;311:5755-2644.; SARAH Patino al. Diagnostic  accuracy of conventional or age adjusted D-dimer cutoff values in older patients with suspected venous thromboembolism. Systemic review and meta-analysis. BMJ 2013:346:f2492.   CBC with platelets and differential   Result Value Ref Range    WBC Count 9.0 4.0 - 11.0 10e3/uL    RBC Count 4.87 3.80 - 5.20 10e6/uL    Hemoglobin 13.5 11.7 - 15.7 g/dL    Hematocrit 41.5 35.0 - 47.0 %    MCV 85 78 - 100 fL    MCH 27.7 26.5 - 33.0 pg    MCHC 32.5 31.5 - 36.5 g/dL    RDW 14.3 10.0 - 15.0 %    Platelet Count 304 150 - 450 10e3/uL    % Neutrophils 66 %    % Lymphocytes 23 %    % Monocytes 8 %    % Eosinophils 2 %    % Basophils 1 %    % Immature Granulocytes 0 %    NRBCs per 100 WBC 0 <1 /100    Absolute Neutrophils 6.0 1.6 - 8.3 10e3/uL    Absolute Lymphocytes 2.1 0.8 - 5.3 10e3/uL    Absolute Monocytes 0.7 0.0 - 1.3 10e3/uL    Absolute Eosinophils 0.2 0.0 - 0.7 10e3/uL    Absolute Basophils 0.1 0.0 - 0.2 10e3/uL    Absolute Immature Granulocytes 0.0 <=0.4 10e3/uL    Absolute NRBCs 0.0 10e3/uL   Magnesium   Result Value Ref Range    Magnesium 1.9 1.7 - 2.3 mg/dL   XR Chest 2 Views    Narrative    EXAM: XR CHEST 2 VIEWS  LOCATION: [facility]  DATE: 4/8/2024    INDICATION: palpitations  COMPARISON: None.      Impression    IMPRESSION: Negative chest.   CT Chest (PE) Abdomen Pelvis w Contrast   Result Value Ref Range    Radiologist flags       Right thyroid 3.2 cm nodule and left upper lobe 0.7 cm pulmonary nodule    Narrative    EXAM: CT CHEST PE ABDOMEN PELVIS W CONTRAST  LOCATION: [facility]  DATE: 4/8/2024    INDICATION: chest pain; mild d dimer elevation; abdominal discomfort with diarrhea  COMPARISON: CT abdomen/pelvis 02/13/2024  TECHNIQUE: CT chest pulmonary angiogram and routine CT abdomen pelvis with IV contrast. Arterial phase through the chest and venous phase through the abdomen and pelvis. Multiplanar reformats and MIP reconstructions were performed.  Dose reduction   techniques were used.   CONTRAST: Isovue 370, 100mL    FINDINGS:  ANGIOGRAM CHEST: Pulmonary arteries are normal caliber and negative for pulmonary emboli. Mild aortic calcifications. Thoracic aorta is negative for dissection. No CT evidence of right heart strain.     LOWER NECK: Right thyroid nodule measuring 3.2 x 2.3 cm. Left thyroid gland appears unremarkable.    LUNGS AND PLEURA: Mosaic attenuation of the lungs, likely due to scattered areas of air trapping. No focal airspace disease. No pleural effusion or pneumothorax. Bilateral small pleural calcifications, likely related to prior asbestos exposure. Left   upper lobe 0.8 cm x 0.6 cm nodule (average diameter 0.7 cm) (series 6, image 83).    MEDIASTINUM/AXILLAE: No lymphadenopathy. No pericardial effusion.    CORONARY ARTERY CALCIFICATION: None.    HEPATOBILIARY: Large area of hepatic steatosis involving the left hepatic lobe and a portion of the right hepatic lobe, with relative sparing of the posterior right hepatic lobe. Redemonstrated peripherally calcified 2.0 cm gallstone in the gallbladder.   No gallbladder wall thickening or pericholecystic fluid. No biliary dilatation.    PANCREAS: Normal.    SPLEEN: Normal.    ADRENAL GLANDS: Normal.    KIDNEYS/BLADDER: Right renal midpole 0.6 cm low-attenuation lesion, too small to characterize. Kidneys otherwise appear normal. No hydronephrosis. Urinary bladder appears normal.    BOWEL: No obstruction or inflammatory change. Normal appendix. No evidence of acute appendicitis.    LYMPH NODES: No lymphadenopathy.    VASCULATURE: Mild atherosclerotic calcifications.    PELVIC ORGANS: Status post hysterectomy.    MUSCULOSKELETAL: Multilevel degenerative changes of the spine. Small fat-containing periumbilical hernia. Redemonstrated left gluteus derrick intramuscular lipoma measuring 5.6 x 2.4 cm.      Impression    IMPRESSION:  1.  No acute findings in the chest, abdomen, or pelvis. No acute pulmonary  embolism.    2.  Incidental right thyroid 3.2 cm nodule. Consider correlation with nonemergent thyroid ultrasound.    3.  Incidental left upper lobe 0.7 cm nodule. If no history of malignancy, consider follow-up per Fleischner recommendations: For low-risk patients, CT at 6-12 months, then consider CT at 18-24 months. For high-risk patients, CT at 6-12 months, then if   persistent, CT at 18-24 months.      [Consider Follow Up: Right thyroid 3.2 cm nodule and left upper lobe 0.7 cm pulmonary nodule]    This report will be copied to the [facility] to ensure a provider acknowledges the finding.          Medications   sodium chloride 0.9% BOLUS 500 mL (0 mLs Intravenous Stopped 4/8/24 1942)   iopamidol (ISOVUE-370) solution 500 mL (100 mLs Intravenous $Given 4/8/24 1856)   sodium chloride 0.9 % bag 100mL for CT scan flush use (70 mLs Intravenous $Given 4/8/24 1857)   traMADol (ULTRAM) tablet 50 mg (50 mg Oral $Given 4/8/24 2054)       Assessments & Plan      I have reviewed the findings, diagnosis, plan and need for follow up with the patient.  Discharge Medication List as of 4/8/2024  8:49 PM          Final diagnoses:   Chest discomfort   Diarrhea, unspecified type   Elevated blood pressure reading   Thyroid nodule   Pulmonary nodule     Disposition: Patient discharged home in stable condition.  Plan as above.  Return for concerns.     Note: Chart documentation done in part with Dragon Voice Recognition software. Although reviewed after completion, some word and grammatical errors may remain.   4/8/2024   [facility] EMERGENCY DEPT       Loreta Johns MD  04/09/24 [id number]

## 2024-04-08 NOTE — ED TRIAGE NOTES
Pt presents with concern of waking up and not feeling well this morning . She was watching tv and she felt palpitations.

## 2024-04-09 NOTE — DISCHARGE INSTRUCTIONS
Continue to monitor symptoms.  I recommend getting a Zio patch placed but I will send a message to your primary care provider to have her placed the order.    I will also send her a message about the thyroid and lung nodule for close follow-up.    Try to drink plenty of fluids.    If you have any significant worsening, changes or concerns return at any time.    I hope you improve quickly and have a wonderful week!!

## 2024-04-15 ENCOUNTER — OFFICE VISIT (OUTPATIENT)
Dept: FAMILY MEDICINE | Facility: CLINIC | Age: 74
End: 2024-04-15
Payer: MEDICARE

## 2024-04-15 VITALS
HEART RATE: 84 BPM | DIASTOLIC BLOOD PRESSURE: 66 MMHG | BODY MASS INDEX: 38.24 KG/M2 | HEIGHT: 64 IN | RESPIRATION RATE: 18 BRPM | SYSTOLIC BLOOD PRESSURE: 124 MMHG | WEIGHT: 224 LBS | TEMPERATURE: 96.9 F | OXYGEN SATURATION: 98 %

## 2024-04-15 DIAGNOSIS — R73.09 ELEVATED HEMOGLOBIN A1C: ICD-10-CM

## 2024-04-15 DIAGNOSIS — E78.00 HIGH CHOLESTEROL: ICD-10-CM

## 2024-04-15 DIAGNOSIS — R91.1 LUNG NODULE: ICD-10-CM

## 2024-04-15 DIAGNOSIS — R00.2 PALPITATIONS: Primary | ICD-10-CM

## 2024-04-15 DIAGNOSIS — E04.1 THYROID NODULE: ICD-10-CM

## 2024-04-15 LAB
CHOLEST SERPL-MCNC: 174 MG/DL
FASTING STATUS PATIENT QL REPORTED: YES
HDLC SERPL-MCNC: 101 MG/DL
LDLC SERPL CALC-MCNC: 54 MG/DL
NONHDLC SERPL-MCNC: 73 MG/DL
T4 FREE SERPL-MCNC: 1.21 NG/DL (ref 0.9–1.7)
TRIGL SERPL-MCNC: 97 MG/DL
TSH SERPL DL<=0.005 MIU/L-ACNC: 4.46 UIU/ML (ref 0.3–4.2)

## 2024-04-15 PROCEDURE — 36415 COLL VENOUS BLD VENIPUNCTURE: CPT | Performed by: NURSE PRACTITIONER

## 2024-04-15 PROCEDURE — 80061 LIPID PANEL: CPT | Performed by: NURSE PRACTITIONER

## 2024-04-15 PROCEDURE — 84439 ASSAY OF FREE THYROXINE: CPT | Performed by: NURSE PRACTITIONER

## 2024-04-15 PROCEDURE — 84443 ASSAY THYROID STIM HORMONE: CPT | Performed by: NURSE PRACTITIONER

## 2024-04-15 PROCEDURE — 99214 OFFICE O/P EST MOD 30 MIN: CPT | Performed by: NURSE PRACTITIONER

## 2024-04-15 NOTE — PROGRESS NOTES
"  Assessment & Plan     Palpitations  Resolved at this point. If symptoms persist, we could consider a Zio Patch for further follow-up. Continue current medications at this point.     Thyroid nodule  Discussed proceeding with ultrasound evaluation of the nodule, thyroid function labs are also pending. She will be contacted with results and recommendations when available.   - US Thyroid; Future  - TSH with free T4 reflex; Future  - TSH with free T4 reflex    ADDENDUM: Ultrasound showing three thyroid nodules, largest 3.2cm in size. FNA recommended, orders signed.     Lung nodule  She is overall low risk, discussed monitoring the nodule over the next several months and repeat chest CT in 6-12 months for follow-up.     High cholesterol  - Lipid panel reflex to direct LDL Non-fasting; Future  - Lipid panel reflex to direct LDL Non-fasting    Elevated hemoglobin A1c  Metformin stopped due to side effects, she is scheduled for follow-up with her PCP in about three weeks. Discussed alternative medication options, GLP-1 or similar given intolerance to metformin.       MED REC REQUIRED  Post Medication Reconciliation Status: patient was not discharged from an inpatient facility or TCU  BMI  Estimated body mass index is 38.45 kg/m  as calculated from the following:    Height as of this encounter: 1.626 m (5' 4\").    Weight as of this encounter: 101.6 kg (224 lb).     I explained my diagnostic considerations and recommendations to the patient, who voiced understanding and agreement with the assessment and treatment plan. All questions were answered to patient's apparent satisfaction. We discussed potential side effects of any prescribed or recommended therapies, as well as expectations for response to treatments and importance of lifestyle measures that may improve symptoms. Patient was advised to contact our office if there are new symptoms or no improvement or worsening of conditions or symptoms.    Greater than 30 minutes " "were spent today with more than 50% dedicated to counseling and coordination of care of the above mentioned problems.      Lucía Asher is a 73 year old, presenting for the following health issues:  ER F/U        4/15/2024     9:07 AM   Additional Questions   Roomed by Kianna KEY MA     [facility] [place]       ED/ Followup:    Facility:  [facility]   Date of visit: 4/8/24  Reason for visit: chest discomfort  Current Status: doing better     Lakeshia presented to the [facility] ED on 4/8/24 with concerns of chest discomfort and \"electric shock\" like sensations over her left upper, anterior chest. She also had complaints of diarrhea since starting metformin about three months ago. She also noted elevated blood pressure prior to presenting to the ED. Work-up in the emergency department was reassuring, EKG was normal sinus rhythm, CBC, troponin and D-dimer were normal. CT of the chest, abdomen and pelvis were also performed without acute findings. There was incidental findings of a right thyroid nodule and incidental left upper lobe lung nodule. She was encouraged to follow-up with her PCP.     Today, Lakeshia states she is overall doing well. She has been monitoring her blood pressure at home, readings have been normal. She has not had any further episodes of the \"electric shock\" symptoms. She has not had any light-headedness, dizziness, palpitations, chest pain, difficulty breathing, syncope or peripheral edema. She reports over the past few days she has been working in her yard and exerting herself to see if her symptoms would return however she has not had any return in her symptoms. She is most concerned today with the incidental findings of the lung nodule and thyroid nodule.She denies any symptoms related to these but notes she did live with a smoker for over 20 years with regular exposure to second hand smoke. Her diarrhea has been improving. She did stop the Metformin she was previously on due " to diarrhea, up to 4-5 times per day. She is scheduled to follow-up with her PCP next month in regards to this.     Patient Active Problem List   Diagnosis    High cholesterol    HTN (hypertension)    Vitamin B12 deficiency    Obesity (BMI 35.0-39.9) with comorbidity (H)    Vitamin D deficiency    DDD (degenerative disc disease), lumbar    Osteoarthritis of spine with radiculopathy, lumbar region    Psychophysiological insomnia    Gastroesophageal reflux disease with esophagitis    Controlled substance agreement signed    Elevated hemoglobin A1c     Current Outpatient Medications   Medication Sig Dispense Refill    atorvastatin (LIPITOR) 20 MG tablet Take 1 tablet (20 mg) by mouth daily 90 tablet 3    carvedilol (COREG) 6.25 MG tablet Take 1 tablet (6.25 mg) by mouth 2 times daily (with meals) 180 tablet 3    Coenzyme Q10 (COQ10 PO) 1 tablet daily      Cyanocobalamin (B-12 PO) Take 1 tablet by mouth daily      famotidine (PEPCID) 20 MG tablet TAKE 1 TABLET TWICE A DAY (Patient taking differently: Take 20 mg by mouth 2 times daily) 180 tablet 3    HYDROcodone-acetaminophen (NORCO) 5-325 MG tablet Take 1 tablet by mouth every 6 hours as needed for severe pain 30 tablet 0    losartan (COZAAR) 50 MG tablet TAKE 2 TABLETS ONCE DAILY (Patient taking differently: Take 100 mg by mouth daily) 180 tablet 3    LYSINE PO Take 1 tablet by mouth 2 times daily      pantoprazole (PROTONIX) 40 MG EC tablet Take 1 tablet (40 mg) by mouth daily 90 tablet 3    TURMERIC PO Take 1 capsule by mouth daily      Vitamin D, Cholecalciferol, 25 MCG (1000 UT) CAPS 1 capsule daily      metFORMIN (GLUCOPHAGE XR) 500 MG 24 hr tablet Take 1 tablet (500 mg) by mouth daily (with dinner) (Patient not taking: Reported on 4/15/2024) 90 tablet 1     No current facility-administered medications for this visit.       Review of Systems  Constitutional, HEENT, cardiovascular, pulmonary, gi and gu systems are negative, except as otherwise noted.     "  Objective    /66   Pulse 84   Temp 96.9  F (36.1  C) (Temporal)   Resp 18   Ht 1.626 m (5' 4\")   Wt 101.6 kg (224 lb)   SpO2 98%   BMI 38.45 kg/m    Body mass index is 38.45 kg/m .  Physical Exam  Vitals reviewed.   Constitutional:       General: She is not in acute distress.     Appearance: Normal appearance.   HENT:      Mouth/Throat:      Mouth: Mucous membranes are moist.      Pharynx: Oropharynx is clear.   Eyes:      Extraocular Movements: Extraocular movements intact.      Conjunctiva/sclera: Conjunctivae normal.      Pupils: Pupils are equal, round, and reactive to light.   Cardiovascular:      Rate and Rhythm: Normal rate and regular rhythm.      Heart sounds: Normal heart sounds.   Pulmonary:      Effort: Pulmonary effort is normal.      Breath sounds: Normal breath sounds.   Musculoskeletal:      Cervical back: Neck supple.   Lymphadenopathy:      Cervical: No cervical adenopathy.   Skin:     General: Skin is warm and dry.      Capillary Refill: Capillary refill takes less than 2 seconds.   Neurological:      Mental Status: She is alert and oriented to person, place, and time. Mental status is at baseline.   Psychiatric:         Mood and Affect: Mood normal.         Behavior: Behavior normal.            Admission on 04/08/2024, Discharged on 04/08/2024   Component Date Value Ref Range Status    Sodium 04/08/2024 138  135 - 145 mmol/L Final    Reference intervals for this test were updated on 09/26/2023 to more accurately reflect our healthy population. There may be differences in the flagging of prior results with similar values performed with this method. Interpretation of those prior results can be made in the context of the updated reference intervals.     Potassium 04/08/2024 4.0  3.4 - 5.3 mmol/L Final    Carbon Dioxide (CO2) 04/08/2024 25  22 - 29 mmol/L Final    Anion Gap 04/08/2024 11  7 - 15 mmol/L Final    Urea Nitrogen 04/08/2024 19.1  8.0 - 23.0 mg/dL Final    Creatinine " 04/08/2024 1.02 (H)  0.51 - 0.95 mg/dL Final    GFR Estimate 04/08/2024 58 (L)  >60 mL/min/1.73m2 Final    Calcium 04/08/2024 9.1  8.8 - 10.2 mg/dL Final    Chloride 04/08/2024 102  98 - 107 mmol/L Final    Glucose 04/08/2024 137 (H)  70 - 99 mg/dL Final    Alkaline Phosphatase 04/08/2024 53  40 - 150 U/L Final    Reference intervals for this test were updated on 11/14/2023 to more accurately reflect our healthy population. There may be differences in the flagging of prior results with similar values performed with this method. Interpretation of those prior results can be made in the context of the updated reference intervals.    AST 04/08/2024 24  0 - 45 U/L Final    Reference intervals for this test were updated on 6/12/2023 to more accurately reflect our healthy population. There may be differences in the flagging of prior results with similar values performed with this method. Interpretation of those prior results can be made in the context of the updated reference intervals.    ALT 04/08/2024 32  0 - 50 U/L Final    Reference intervals for this test were updated on 6/12/2023 to more accurately reflect our healthy population. There may be differences in the flagging of prior results with similar values performed with this method. Interpretation of those prior results can be made in the context of the updated reference intervals.      Protein Total 04/08/2024 7.8  6.4 - 8.3 g/dL Final    Albumin 04/08/2024 4.3  3.5 - 5.2 g/dL Final    Bilirubin Total 04/08/2024 0.4  <=1.2 mg/dL Final    Troponin T, High Sensitivity 04/08/2024 12  <=14 ng/L Final    Either a High Sensitivity Troponin T baseline (0 hours) value = 100 ng/L, or an increase in High Sensitivity Troponin T = 7 ng/L at 2 hours compared to 0 hours (2-0 hours), suggests myocardial injury, and urgent clinical attention is required.    If the 2-0 hours increase is <7 ng/L, a High Sensitivity Troponin T result above gender-specific reference ranges warrants  further evaluation.   Recommendations for further evaluation include correlation with clinical decision-making tool (e.g., HEART), a 3rd High Sensitivity Troponin T test 2 hours after the 2nd (a 20% change from baseline would represent concern), admission for observation, close PCC/cardiology follow-up, or urgent outpatient provocative testing.    D-Dimer Quantitative 04/08/2024 0.59 (H)  0.00 - 0.50 ug/mL FEU Final    WBC Count 04/08/2024 9.0  4.0 - 11.0 10e3/uL Final    RBC Count 04/08/2024 4.87  3.80 - 5.20 10e6/uL Final    Hemoglobin 04/08/2024 13.5  11.7 - 15.7 g/dL Final    Hematocrit 04/08/2024 41.5  35.0 - 47.0 % Final    MCV 04/08/2024 85  78 - 100 fL Final    MCH 04/08/2024 27.7  26.5 - 33.0 pg Final    MCHC 04/08/2024 32.5  31.5 - 36.5 g/dL Final    RDW 04/08/2024 14.3  10.0 - 15.0 % Final    Platelet Count 04/08/2024 304  150 - 450 10e3/uL Final    % Neutrophils 04/08/2024 66  % Final    % Lymphocytes 04/08/2024 23  % Final    % Monocytes 04/08/2024 8  % Final    % Eosinophils 04/08/2024 2  % Final    % Basophils 04/08/2024 1  % Final    % Immature Granulocytes 04/08/2024 0  % Final    NRBCs per 100 WBC 04/08/2024 0  <1 /100 Final    Absolute Neutrophils 04/08/2024 6.0  1.6 - 8.3 10e3/uL Final    Absolute Lymphocytes 04/08/2024 2.1  0.8 - 5.3 10e3/uL Final    Absolute Monocytes 04/08/2024 0.7  0.0 - 1.3 10e3/uL Final    Absolute Eosinophils 04/08/2024 0.2  0.0 - 0.7 10e3/uL Final    Absolute Basophils 04/08/2024 0.1  0.0 - 0.2 10e3/uL Final    Absolute Immature Granulocytes 04/08/2024 0.0  <=0.4 10e3/uL Final    Absolute NRBCs 04/08/2024 0.0  10e3/uL Final    Radiologist flags 04/08/2024 Right thyroid 3.2 cm nodule and left upper lobe 0.7 cm pulmonary nodule   Final    Magnesium 04/08/2024 1.9  1.7 - 2.3 mg/dL Final           Signed Electronically by: Janey Romero NP

## 2024-04-18 ENCOUNTER — HOSPITAL ENCOUNTER (OUTPATIENT)
Dept: ULTRASOUND IMAGING | Facility: CLINIC | Age: 74
Discharge: HOME | End: 2024-04-18
Attending: NURSE PRACTITIONER | Admitting: NURSE PRACTITIONER
Payer: MEDICARE

## 2024-04-18 ENCOUNTER — MYC MEDICAL ADVICE (OUTPATIENT)
Dept: FAMILY MEDICINE | Facility: CLINIC | Age: 74
End: 2024-04-18
Payer: MEDICARE

## 2024-04-18 DIAGNOSIS — E04.1 THYROID NODULE: ICD-10-CM

## 2024-04-18 PROCEDURE — 76536 US EXAM OF HEAD AND NECK: CPT

## 2024-04-26 ENCOUNTER — HOSPITAL ENCOUNTER (OUTPATIENT)
Dept: ULTRASOUND IMAGING | Facility: CLINIC | Age: 74
Discharge: HOME | End: 2024-04-26
Attending: NURSE PRACTITIONER | Admitting: NURSE PRACTITIONER
Payer: MEDICARE

## 2024-04-26 DIAGNOSIS — E04.1 THYROID NODULE: ICD-10-CM

## 2024-04-26 PROCEDURE — 10005 FNA BX W/US GDN 1ST LES: CPT

## 2024-04-26 PROCEDURE — 88173 CYTOPATH EVAL FNA REPORT: CPT | Mod: 26 | Performed by: PATHOLOGY

## 2024-04-26 PROCEDURE — 250N000009 HC RX 250: Performed by: RADIOLOGY

## 2024-04-26 PROCEDURE — 88173 CYTOPATH EVAL FNA REPORT: CPT | Mod: TC | Performed by: NURSE PRACTITIONER

## 2024-04-26 RX ORDER — LIDOCAINE HYDROCHLORIDE 10 MG/ML
10 INJECTION, SOLUTION EPIDURAL; INFILTRATION; INTRACAUDAL; PERINEURAL ONCE
Status: COMPLETED | OUTPATIENT
Start: 2024-04-26 | End: 2024-04-26

## 2024-04-26 RX ADMIN — LIDOCAINE HYDROCHLORIDE 5 ML: 10 INJECTION, SOLUTION EPIDURAL; INFILTRATION; INTRACAUDAL; PERINEURAL at 15:06

## 2024-05-01 LAB
PATH REPORT.COMMENTS IMP SPEC: NORMAL
PATH REPORT.COMMENTS IMP SPEC: NORMAL
PATH REPORT.FINAL DX SPEC: NORMAL
PATH REPORT.GROSS SPEC: NORMAL
PATH REPORT.MICROSCOPIC SPEC OTHER STN: NORMAL
PATH REPORT.RELEVANT HX SPEC: NORMAL

## 2024-05-03 ENCOUNTER — OFFICE VISIT (OUTPATIENT)
Dept: FAMILY MEDICINE | Facility: CLINIC | Age: 74
End: 2024-05-03
Payer: MEDICARE

## 2024-05-03 VITALS
WEIGHT: 228 LBS | TEMPERATURE: 97.7 F | OXYGEN SATURATION: 96 % | DIASTOLIC BLOOD PRESSURE: 68 MMHG | HEART RATE: 87 BPM | HEIGHT: 64 IN | BODY MASS INDEX: 38.93 KG/M2 | SYSTOLIC BLOOD PRESSURE: 128 MMHG

## 2024-05-03 DIAGNOSIS — M47.26 OSTEOARTHRITIS OF SPINE WITH RADICULOPATHY, LUMBAR REGION: ICD-10-CM

## 2024-05-03 DIAGNOSIS — E04.1 THYROID NODULE: Primary | ICD-10-CM

## 2024-05-03 PROCEDURE — 99213 OFFICE O/P EST LOW 20 MIN: CPT | Performed by: PHYSICIAN ASSISTANT

## 2024-05-03 RX ORDER — HYDROCODONE BITARTRATE AND ACETAMINOPHEN 5; 325 MG/1; MG/1
1 TABLET ORAL EVERY 6 HOURS PRN
Qty: 30 TABLET | Refills: 0 | Status: SHIPPED | OUTPATIENT
Start: 2024-05-03 | End: 2024-06-03

## 2024-05-03 ASSESSMENT — PAIN SCALES - GENERAL: PAINLEVEL: NO PAIN (0)

## 2024-05-03 NOTE — PROGRESS NOTES
"  Lucía Asher is a 73 year old, presenting for the following health issues:  Results      5/3/2024     9:43 AM   Additional Questions   Roomed by Gregoria CRAVEN   Accompanied by -Cruzito     History of Present Illness       Reason for visit:  Follow up on thyroid biopsy    She eats 0-1 servings of fruits and vegetables daily.She consumes 0 sweetened beverage(s) daily.She exercises with enough effort to increase her heart rate 10 to 19 minutes per day.  She exercises with enough effort to increase her heart rate 3 or less days per week.   She is taking medications regularly.     Patient was seen in the ER on 4/8 and at that time had a CT scan of her chest. This incidentally showed a right thyroid nodule measuring 3.2 x 2.3 cm. She then under went ultrasound and biopsy. Pathology consistent with follicular nodular disease. We discussed today this is benign but has the potential to carry a 2-7% risk of malignancy. This worries her. She inquires about having this removed. Would ideally like to meet with ENT regarding this.  just went through cancer so just doesn't feel comfortable leaving this.         Review of Systems  Constitutional, neuro, ENT, endocrine, pulmonary, cardiac, gastrointestinal, genitourinary, musculoskeletal, integument and psychiatric systems are negative, except as otherwise noted.      Objective    /68   Pulse 87   Temp 97.7  F (36.5  C) (Temporal)   Ht 1.626 m (5' 4\")   Wt 103.4 kg (228 lb)   SpO2 96%   BMI 39.14 kg/m    Body mass index is 39.14 kg/m .  Physical Exam   GENERAL: alert and no distress  NECK: no adenopathy, no asymmetry, masses, or scars  RESP: lungs clear to auscultation - no rales, rhonchi or wheezes  CV: regular rate and rhythm, normal S1 S2, no S3 or S4, no murmur, click or rub, no peripheral edema   SKIN: no suspicious lesions or rashes  PSYCH: mentation appears normal, affect normal/bright      Assessment & Plan     Thyroid nodule  A referral was placed " to ENT as patient would prefer to have this nodule removed if able. We discussed the alternative would be monitoring annually for the next 5 years.   - Adult ENT  Referral; Future    Osteoarthritis of spine with radiculopathy, lumbar region  Uses as needed for severe pain only.   - HYDROcodone-acetaminophen (NORCO) 5-325 MG tablet; Take 1 tablet by mouth every 6 hours as needed for severe pain    The patient indicates understanding of these issues and agrees with the plan.    Signed Electronically by: Gregoria Kumari PA-C

## 2024-05-04 ENCOUNTER — HEALTH MAINTENANCE LETTER (OUTPATIENT)
Age: 74
End: 2024-05-04

## 2024-05-06 ENCOUNTER — MYC MEDICAL ADVICE (OUTPATIENT)
Dept: FAMILY MEDICINE | Facility: CLINIC | Age: 74
End: 2024-05-06
Payer: MEDICARE

## 2024-05-07 NOTE — TELEPHONE ENCOUNTER
FUTURE VISIT INFORMATION      FUTURE VISIT INFORMATION:  Date:  6/17/24- Dr. Devi  Time:  3:20PM  Location[place] [facility] [place]  REFERRAL INFORMATION:  Referring provider:  Gregoria Kumari PA-C.  Referring providers clinic:  [facility]  Reason for visit/diagnosis:  Thyroid nodule [E04.1]. Ref by Gregoria Kumari PA-C. [facility] [place] verified     RECORDS REQUESTED FROM      Clinic name Comments Records Status Imaging Status   [facility] 5/3/24 referral/ OV note- Gregoria Kumari PA-C  4/15/24 OV Note- Janey Romero NP  [facility] 4/26/24 US biopsy thyroid FNA  4/18/24 US thyroid  4/8/24 CT chest abd pel [facility] PACS   [facility] [place]  [address]   1st floor, [address]  [facility] [address]  ph: [phone] opt 4  fax: [phone] Fine Needle Aspirate Thyroid, Lower Lobe, Right: 4/26/24 Case: [id number]  Final Diagnosis   A.  Right lower lobe thyroid, fine-needle aspiration:                 Interpretation:                  Consistent with follicular nodular disease (includes adenomatoid nodule, colloid nodule, etc.).  Benign ([place] II)   [employer]: [id number] Epic    Path req 5/7/24    Path received            Pathology Material Received May 9, 2024 12:37 PM  [id number]     Action Taken 8 slides received from [facility] for 4/26/24 Case: [id number]. Sent to 5th floor pathology lab with consult form.

## 2024-05-10 ENCOUNTER — LAB REQUISITION (OUTPATIENT)
Dept: LAB | Facility: CLINIC | Age: 74
End: 2024-05-10
Payer: MEDICARE

## 2024-05-10 LAB
PATH REPORT.COMMENTS IMP SPEC: NORMAL
PATH REPORT.FINAL DX SPEC: NORMAL
PATH REPORT.GROSS SPEC: NORMAL
PATH REPORT.MICROSCOPIC SPEC OTHER STN: NORMAL
PATH REPORT.RELEVANT HX SPEC: NORMAL
PATH REPORT.RELEVANT HX SPEC: NORMAL
PATH REPORT.SITE OF ORIGIN SPEC: NORMAL

## 2024-05-16 DIAGNOSIS — I10 ESSENTIAL HYPERTENSION: ICD-10-CM

## 2024-05-17 RX ORDER — LOSARTAN POTASSIUM 50 MG/1
TABLET ORAL
Qty: 180 TABLET | Refills: 3 | Status: SHIPPED | OUTPATIENT
Start: 2024-05-17

## 2024-06-03 ENCOUNTER — MYC REFILL (OUTPATIENT)
Dept: FAMILY MEDICINE | Facility: CLINIC | Age: 74
End: 2024-06-03
Payer: MEDICARE

## 2024-06-03 DIAGNOSIS — M47.26 OSTEOARTHRITIS OF SPINE WITH RADICULOPATHY, LUMBAR REGION: ICD-10-CM

## 2024-06-03 RX ORDER — HYDROCODONE BITARTRATE AND ACETAMINOPHEN 5; 325 MG/1; MG/1
1 TABLET ORAL EVERY 6 HOURS PRN
Qty: 30 TABLET | Refills: 0 | OUTPATIENT
Start: 2024-06-03

## 2024-06-03 RX ORDER — HYDROCODONE BITARTRATE AND ACETAMINOPHEN 5; 325 MG/1; MG/1
1 TABLET ORAL EVERY 6 HOURS PRN
Qty: 30 TABLET | Refills: 0 | Status: SHIPPED | OUTPATIENT
Start: 2024-06-03 | End: 2024-07-08

## 2024-06-05 ENCOUNTER — TELEPHONE (OUTPATIENT)
Dept: OTOLARYNGOLOGY | Facility: CLINIC | Age: 74
End: 2024-06-05
Payer: MEDICARE

## 2024-06-05 NOTE — TELEPHONE ENCOUNTER
[facility]    Phone Message    May a detailed message be left on voicemail: yes     Reason for Call: Other: Pt called in with some concerns with provider not in network with her Medicare insurance. Pt stated she called her insurance and was told provider doesn't accept medicare. She is not sure what she should do and if she needs to cancel her upcoming appt 06/17/24 and schedule with another provider. Please call to discuss. Thanks     Action Taken: Message routed to:  [facility] ENT     Travel Screening: Not Applicable     Date of Service:

## 2024-06-06 NOTE — TELEPHONE ENCOUNTER
Spoke to patient regarding insurance coverage of appointment with Dr. Devi. Discussed with patient that this will be reviewed and will update patient on coverage status. Patient verbalized understanding of plan of care.    Magalis CASTANEDAN, RN

## 2024-06-07 ENCOUNTER — TELEPHONE (OUTPATIENT)
Dept: OTOLARYNGOLOGY | Facility: CLINIC | Age: 74
End: 2024-06-07
Payer: MEDICARE

## 2024-06-12 ENCOUNTER — PRE VISIT (OUTPATIENT)
Dept: OTOLARYNGOLOGY | Facility: CLINIC | Age: 74
End: 2024-06-12

## 2024-06-17 ENCOUNTER — TELEPHONE (OUTPATIENT)
Dept: OTOLARYNGOLOGY | Facility: CLINIC | Age: 74
End: 2024-06-17

## 2024-06-17 ENCOUNTER — VIRTUAL VISIT (OUTPATIENT)
Dept: OTOLARYNGOLOGY | Facility: CLINIC | Age: 74
End: 2024-06-17
Attending: PHYSICIAN ASSISTANT
Payer: MEDICARE

## 2024-06-17 DIAGNOSIS — E04.1 THYROID NODULE: ICD-10-CM

## 2024-06-17 PROCEDURE — 99203 OFFICE O/P NEW LOW 30 MIN: CPT | Mod: 95 | Performed by: SURGERY

## 2024-06-17 NOTE — LETTER
6/17/2024       RE: Shelly Carcamo  [address]  [place] [address]     Dear Colleague,    Thank you for referring your patient, Shelly Carcamo, to the [facility] [place] [facility]. Please see a copy of my visit note below.    Due to the COVID 19 pandemic this visit was a video visit in order to help prevent spread of infection in this high risk patient and the general population. The patient gave verbal consent for the visit today.    Start time 3:20pm  Stop time 3:40pm  Total time 20 minutes face to face conversation video   Chart prep , review of images, results , start note prior to appt 20 minutes   Total 40 minutes     SURGERY CLINIC CONSULTATION    REASON FOR CONSULTATION:  Shelly Carcamo was referred by Dr. Kumari for evaluation and discussion of treatment options for thyroid nodule     HISTORY OF PRESENT ILLNESS:  Shelly Carcamo is a 73 year old female who had a CT PE study done for shortness of breath.  On the scan it is noted that the patient had a 3.2 cm right thyroid nodule.  Ultrasound confirmed this nodule.  Biopsy of the nodule was benign.  Her thyroid function test within normal limits.      Symptoms of these thyroid nodules: She reports that when the biopsy was done she developed some symptoms of fullness and felt as if her heart may have been racing.  Current symptoms include some mild fullness with moving her neck.  But this does not affect her daily.  She has no problems with voice quality inspiration or swallowing.  She again admits to some intermittent heart palpitations and some hypertension.    There is no family or previous history of thyroid carcinoma.  No previous head neck radiation treatment.    REVIEW OF SYSTEMS:  ROS EXAM: 10 point review of systems is pertinent for that noted in the HPI  Patient Active Problem List   Diagnosis    High cholesterol    HTN (hypertension)    Vitamin B12  deficiency    Obesity (BMI 35.0-39.9) with comorbidity (H)    Vitamin D deficiency    DDD (degenerative disc disease), lumbar    Osteoarthritis of spine with radiculopathy, lumbar region    Psychophysiological insomnia    Gastroesophageal reflux disease with esophagitis    Controlled substance agreement signed    Elevated hemoglobin A1c       Past Surgical History:   Procedure Laterality Date    BIOPSY  2018    Hysterectomy    CATARACT IOL, RT/LT Bilateral 2015    COLONOSCOPY  2016    Biopsy negitive    ENT SURGERY  1956    HYSTERECTOMY      HYSTERECTOMY, PAP NO LONGER INDICATED      ORTHOPEDIC SURGERY  2016    both wrist Carpal tunnel surgery       No Known Allergies    Medications reviewed in the EMR        Family History   Problem Relation Age of Onset    Pancreatic Cancer Mother     Hypertension Mother     Obesity Mother     ALS Father     Coronary Artery Disease Father         Valve replacement. Aortic valve calcification    Hyperlipidemia Father     Obesity Father     Obesity Maternal Grandmother     Glaucoma Maternal Grandfather     Atrial fibrillation Brother     Glaucoma Other     Coronary Artery Disease Brother         AFIB    Hypertension Brother     Hyperlipidemia Brother     Obesity Brother     Macular Degeneration No family hx of      I personally reviewed the radiographic images and laboratory data    ASSESSMENT:   1. Thyroid nodule        PLAN:   This is a benign essentially asymptomatic thyroid nodule that is less than 4 cm in size.  Based on this I would not recommend surgical intervention.  We discussed follow-up for this thyroid nodule that would include an ultrasound in 1 year.  If the nodule changes in size by more than 20% or characteristics then would recommend a repeat biopsy.  If the nodule becomes significantly symptomatic then I would reevaluate the patient    Ashley Devi MD

## 2024-07-06 NOTE — PROGRESS NOTES
Due to the COVID 19 pandemic this visit was a video visit in order to help prevent spread of infection in this high risk patient and the general population. The patient gave verbal consent for the visit today.    Start time 3:20pm  Stop time 3:40pm  Total time 20 minutes face to face conversation video   Chart prep , review of images, results , start note prior to appt 20 minutes   Total 40 minutes     SURGERY CLINIC CONSULTATION    REASON FOR CONSULTATION:  Shelly Carcamo was referred by Dr. Kumari for evaluation and discussion of treatment options for thyroid nodule     HISTORY OF PRESENT ILLNESS:  Shelly Carcamo is a 73 year old female who had a CT PE study done for shortness of breath.  On the scan it is noted that the patient had a 3.2 cm right thyroid nodule.  Ultrasound confirmed this nodule.  Biopsy of the nodule was benign.  Her thyroid function test within normal limits.      Symptoms of these thyroid nodules: She reports that when the biopsy was done she developed some symptoms of fullness and felt as if her heart may have been racing.  Current symptoms include some mild fullness with moving her neck.  But this does not affect her daily.  She has no problems with voice quality inspiration or swallowing.  She again admits to some intermittent heart palpitations and some hypertension.    There is no family or previous history of thyroid carcinoma.  No previous head neck radiation treatment.    REVIEW OF SYSTEMS:  ROS EXAM: 10 point review of systems is pertinent for that noted in the HPI  Patient Active Problem List   Diagnosis    High cholesterol    HTN (hypertension)    Vitamin B12 deficiency    Obesity (BMI 35.0-39.9) with comorbidity (H)    Vitamin D deficiency    DDD (degenerative disc disease), lumbar    Osteoarthritis of spine with radiculopathy, lumbar region    Psychophysiological insomnia    Gastroesophageal reflux disease with esophagitis    Controlled substance agreement signed    Elevated  hemoglobin A1c       Past Surgical History:   Procedure Laterality Date    BIOPSY  2018    Hysterectomy    CATARACT IOL, RT/LT Bilateral 2015    COLONOSCOPY  2016    Biopsy negitive    ENT SURGERY  1956    HYSTERECTOMY      HYSTERECTOMY, PAP NO LONGER INDICATED      ORTHOPEDIC SURGERY  2016    both wrist Carpal tunnel surgery       No Known Allergies    Medications reviewed in the EMR        Family History   Problem Relation Age of Onset    Pancreatic Cancer Mother     Hypertension Mother     Obesity Mother     ALS Father     Coronary Artery Disease Father         Valve replacement. Aortic valve calcification    Hyperlipidemia Father     Obesity Father     Obesity Maternal Grandmother     Glaucoma Maternal Grandfather     Atrial fibrillation Brother     Glaucoma Other     Coronary Artery Disease Brother         AFIB    Hypertension Brother     Hyperlipidemia Brother     Obesity Brother     Macular Degeneration No family hx of      I personally reviewed the radiographic images and laboratory data    ASSESSMENT:   1. Thyroid nodule        PLAN:   This is a benign essentially asymptomatic thyroid nodule that is less than 4 cm in size.  Based on this I would not recommend surgical intervention.  We discussed follow-up for this thyroid nodule that would include an ultrasound in 1 year.  If the nodule changes in size by more than 20% or characteristics then would recommend a repeat biopsy.  If the nodule becomes significantly symptomatic then I would reevaluate the patient    Ashley Devi MD

## 2024-07-08 ENCOUNTER — MYC REFILL (OUTPATIENT)
Dept: FAMILY MEDICINE | Facility: CLINIC | Age: 74
End: 2024-07-08
Payer: MEDICARE

## 2024-07-08 DIAGNOSIS — M47.26 OSTEOARTHRITIS OF SPINE WITH RADICULOPATHY, LUMBAR REGION: ICD-10-CM

## 2024-07-09 RX ORDER — HYDROCODONE BITARTRATE AND ACETAMINOPHEN 5; 325 MG/1; MG/1
1 TABLET ORAL EVERY 6 HOURS PRN
Qty: 30 TABLET | Refills: 0 | Status: SHIPPED | OUTPATIENT
Start: 2024-07-09 | End: 2024-08-21

## 2024-07-28 DIAGNOSIS — K21.00 GASTROESOPHAGEAL REFLUX DISEASE WITH ESOPHAGITIS WITHOUT HEMORRHAGE: ICD-10-CM

## 2024-07-29 RX ORDER — FAMOTIDINE 20 MG/1
TABLET, FILM COATED ORAL
Qty: 180 TABLET | Refills: 0 | Status: SHIPPED | OUTPATIENT
Start: 2024-07-29

## 2024-08-02 ENCOUNTER — HOSPITAL ENCOUNTER (EMERGENCY)
Facility: CLINIC | Age: 74
Discharge: HOME | End: 2024-08-02
Attending: EMERGENCY MEDICINE | Admitting: EMERGENCY MEDICINE
Payer: MEDICARE

## 2024-08-02 ENCOUNTER — APPOINTMENT (OUTPATIENT)
Dept: GENERAL RADIOLOGY | Facility: CLINIC | Age: 74
End: 2024-08-02
Attending: EMERGENCY MEDICINE
Payer: MEDICARE

## 2024-08-02 ENCOUNTER — MYC MEDICAL ADVICE (OUTPATIENT)
Dept: FAMILY MEDICINE | Facility: CLINIC | Age: 74
End: 2024-08-02

## 2024-08-02 ENCOUNTER — NURSE TRIAGE (OUTPATIENT)
Dept: FAMILY MEDICINE | Facility: CLINIC | Age: 74
End: 2024-08-02

## 2024-08-02 VITALS
HEIGHT: 64 IN | OXYGEN SATURATION: 97 % | DIASTOLIC BLOOD PRESSURE: 81 MMHG | BODY MASS INDEX: 37.39 KG/M2 | SYSTOLIC BLOOD PRESSURE: 154 MMHG | WEIGHT: 219 LBS | HEART RATE: 75 BPM | RESPIRATION RATE: 16 BRPM | TEMPERATURE: 97.8 F

## 2024-08-02 DIAGNOSIS — F41.0 ANXIETY ATTACK: ICD-10-CM

## 2024-08-02 DIAGNOSIS — I10 ACCELERATED HYPERTENSION: ICD-10-CM

## 2024-08-02 LAB
ALBUMIN SERPL BCG-MCNC: 4.1 G/DL (ref 3.5–5.2)
ALP SERPL-CCNC: 50 U/L (ref 40–150)
ALT SERPL W P-5'-P-CCNC: 29 U/L (ref 0–50)
ANION GAP SERPL CALCULATED.3IONS-SCNC: 12 MMOL/L (ref 7–15)
AST SERPL W P-5'-P-CCNC: 24 U/L (ref 0–45)
BASOPHILS # BLD AUTO: 0.1 10E3/UL (ref 0–0.2)
BASOPHILS NFR BLD AUTO: 1 %
BILIRUB SERPL-MCNC: 0.4 MG/DL
BUN SERPL-MCNC: 10.8 MG/DL (ref 8–23)
CALCIUM SERPL-MCNC: 9.2 MG/DL (ref 8.8–10.4)
CHLORIDE SERPL-SCNC: 106 MMOL/L (ref 98–107)
CREAT SERPL-MCNC: 1.05 MG/DL (ref 0.51–0.95)
EGFRCR SERPLBLD CKD-EPI 2021: 56 ML/MIN/1.73M2
EOSINOPHIL # BLD AUTO: 0.1 10E3/UL (ref 0–0.7)
EOSINOPHIL NFR BLD AUTO: 2 %
ERYTHROCYTE [DISTWIDTH] IN BLOOD BY AUTOMATED COUNT: 14.3 % (ref 10–15)
GLUCOSE SERPL-MCNC: 124 MG/DL (ref 70–99)
HCO3 SERPL-SCNC: 24 MMOL/L (ref 22–29)
HCT VFR BLD AUTO: 41.6 % (ref 35–47)
HGB BLD-MCNC: 13.9 G/DL (ref 11.7–15.7)
IMM GRANULOCYTES # BLD: 0 10E3/UL
IMM GRANULOCYTES NFR BLD: 0 %
LYMPHOCYTES # BLD AUTO: 1.7 10E3/UL (ref 0.8–5.3)
LYMPHOCYTES NFR BLD AUTO: 25 %
MCH RBC QN AUTO: 28.3 PG (ref 26.5–33)
MCHC RBC AUTO-ENTMCNC: 33.4 G/DL (ref 31.5–36.5)
MCV RBC AUTO: 85 FL (ref 78–100)
MONOCYTES # BLD AUTO: 0.5 10E3/UL (ref 0–1.3)
MONOCYTES NFR BLD AUTO: 7 %
NEUTROPHILS # BLD AUTO: 4.4 10E3/UL (ref 1.6–8.3)
NEUTROPHILS NFR BLD AUTO: 65 %
NRBC # BLD AUTO: 0 10E3/UL
NRBC BLD AUTO-RTO: 0 /100
PLATELET # BLD AUTO: 262 10E3/UL (ref 150–450)
POTASSIUM SERPL-SCNC: 4.1 MMOL/L (ref 3.4–5.3)
PROT SERPL-MCNC: 6.8 G/DL (ref 6.4–8.3)
RBC # BLD AUTO: 4.92 10E6/UL (ref 3.8–5.2)
SODIUM SERPL-SCNC: 142 MMOL/L (ref 135–145)
TROPONIN T SERPL HS-MCNC: 11 NG/L
WBC # BLD AUTO: 6.7 10E3/UL (ref 4–11)

## 2024-08-02 PROCEDURE — 36415 COLL VENOUS BLD VENIPUNCTURE: CPT | Performed by: EMERGENCY MEDICINE

## 2024-08-02 PROCEDURE — 99285 EMERGENCY DEPT VISIT HI MDM: CPT | Mod: 25

## 2024-08-02 PROCEDURE — 85025 COMPLETE CBC W/AUTO DIFF WBC: CPT | Performed by: EMERGENCY MEDICINE

## 2024-08-02 PROCEDURE — 250N000011 HC RX IP 250 OP 636: Performed by: EMERGENCY MEDICINE

## 2024-08-02 PROCEDURE — 99284 EMERGENCY DEPT VISIT MOD MDM: CPT | Performed by: EMERGENCY MEDICINE

## 2024-08-02 PROCEDURE — 93005 ELECTROCARDIOGRAM TRACING: CPT

## 2024-08-02 PROCEDURE — 82247 BILIRUBIN TOTAL: CPT | Performed by: EMERGENCY MEDICINE

## 2024-08-02 PROCEDURE — 71046 X-RAY EXAM CHEST 2 VIEWS: CPT

## 2024-08-02 PROCEDURE — 93010 ELECTROCARDIOGRAM REPORT: CPT | Performed by: EMERGENCY MEDICINE

## 2024-08-02 PROCEDURE — 84484 ASSAY OF TROPONIN QUANT: CPT | Performed by: EMERGENCY MEDICINE

## 2024-08-02 RX ORDER — LORAZEPAM 2 MG/ML
0.5 INJECTION INTRAMUSCULAR ONCE
Status: COMPLETED | OUTPATIENT
Start: 2024-08-02 | End: 2024-08-02

## 2024-08-02 RX ORDER — ALPRAZOLAM 0.25 MG
0.25 TABLET ORAL 3 TIMES DAILY PRN
Qty: 10 TABLET | Refills: 0 | Status: SHIPPED | OUTPATIENT
Start: 2024-08-02

## 2024-08-02 RX ADMIN — LORAZEPAM 0.5 MG: 2 INJECTION INTRAMUSCULAR; INTRAVENOUS at 10:23

## 2024-08-02 ASSESSMENT — ACTIVITIES OF DAILY LIVING (ADL)
ADLS_ACUITY_SCORE: 33
ADLS_ACUITY_SCORE: 35

## 2024-08-02 ASSESSMENT — COLUMBIA-SUICIDE SEVERITY RATING SCALE - C-SSRS
2. HAVE YOU ACTUALLY HAD ANY THOUGHTS OF KILLING YOURSELF IN THE PAST MONTH?: NO
1. IN THE PAST MONTH, HAVE YOU WISHED YOU WERE DEAD OR WISHED YOU COULD GO TO SLEEP AND NOT WAKE UP?: NO
6. HAVE YOU EVER DONE ANYTHING, STARTED TO DO ANYTHING, OR PREPARED TO DO ANYTHING TO END YOUR LIFE?: NO

## 2024-08-02 NOTE — ED PROVIDER NOTES
"  History     Chief Complaint   Patient presents with    Hypertension     HPI  Shelly Carcamo is a 73 year old female who presents with concern of elevated blood pressure.  She woke this morning and said she felt a \"whooshing\" in her ears.  Started in her left ear but notices it in both.  Has a history of tinnitus but this is different.  When she got out of bed she said that she was not feeling well and continued to notice the whooshing sound in her ear, and so she checked her blood pressure.  It was elevated, and she continue to check it at home several more times with ongoing readings of hypertension.  Called her primary clinic but did not hear back from her provider.  She also felt there was a shakiness in her chest and felt unsteady on her feet.  She and her  came to the ER for further evaluation.  She does become tearful when explaining her symptoms.  Also states that she has been under quite a lot of stress, her  was recently diagnosed with bladder tumors and needs to undergo further treatment.  She does have a history of hypertension and takes blood pressure medication, which she took this morning before coming to the ER.  Otherwise has not had any changes in her medication.  Is not feeling short of breath.  Has not been vomiting.    Allergies:  No Known Allergies    Problem List:    Patient Active Problem List    Diagnosis Date Noted    Gastroesophageal reflux disease with esophagitis 02/10/2020     Priority: Medium    Controlled substance agreement signed 02/10/2020     Priority: Medium    Elevated hemoglobin A1c 02/10/2020     Priority: Medium    Obesity (BMI 35.0-39.9) with comorbidity (H) 01/29/2020     Priority: Medium    Vitamin D deficiency 01/29/2020     Priority: Medium    DDD (degenerative disc disease), lumbar 01/29/2020     Priority: Medium    Osteoarthritis of spine with radiculopathy, lumbar region 01/29/2020     Priority: Medium    Psychophysiological insomnia 01/29/2020     " Priority: Medium    High cholesterol 12/08/2015     Priority: Medium    HTN (hypertension) 12/08/2015     Priority: Medium    Vitamin B12 deficiency 12/08/2015     Priority: Medium        Past Medical History:    Past Medical History:   Diagnosis Date    Cataract     Chronic osteoarthritis     Gastroesophageal reflux disease     Hyperlipidemia     Hypertension        Past Surgical History:    Past Surgical History:   Procedure Laterality Date    BIOPSY  2018    Hysterectomy    CATARACT IOL, RT/LT Bilateral 2015    COLONOSCOPY  2016    Biopsy negitive    ENT SURGERY  1956    HYSTERECTOMY      HYSTERECTOMY, PAP NO LONGER INDICATED      ORTHOPEDIC SURGERY  2016    both wrist Carpal tunnel surgery       Family History:    Family History   Problem Relation Age of Onset    Pancreatic Cancer Mother     Hypertension Mother     Obesity Mother     ALS Father     Coronary Artery Disease Father         Valve replacement. Aortic valve calcification    Hyperlipidemia Father     Obesity Father     Obesity Maternal Grandmother     Glaucoma Maternal Grandfather     Atrial fibrillation Brother     Glaucoma Other     Coronary Artery Disease Brother         AFIB    Hypertension Brother     Hyperlipidemia Brother     Obesity Brother     Macular Degeneration No family hx of        Social History:  Marital Status:   [2]  Social History     Tobacco Use    Smoking status: Never    Smokeless tobacco: Never   Vaping Use    Vaping status: Never Used   Substance Use Topics    Alcohol use: Yes     Comment: socially on occasion    Drug use: Never        Medications:    ALPRAZolam (XANAX) 0.25 MG tablet  atorvastatin (LIPITOR) 20 MG tablet  carvedilol (COREG) 6.25 MG tablet  losartan (COZAAR) 50 MG tablet  Coenzyme Q10 (COQ10 PO)  Cyanocobalamin (B-12 PO)  diazepam (VALIUM) 5 MG tablet  famotidine (PEPCID) 20 MG tablet  HYDROcodone-acetaminophen (NORCO) 5-325 MG tablet  LYSINE PO  pantoprazole (PROTONIX) 40 MG EC tablet  TURMERIC  "PO  Vitamin D, Cholecalciferol, 25 MCG (1000 UT) CAPS          Review of Systems   All other systems reviewed and are negative.      Physical Exam   BP: (!) 172/102  Pulse: 88  Temp: 97.8  F (36.6  C)  Resp: 20  Height: 162.6 cm (5' 4\")  Weight: 99.3 kg (219 lb)  SpO2: 99 %      Physical Exam  Vitals and nursing note reviewed.   Constitutional:       Appearance: She is not diaphoretic.   HENT:      Head: Normocephalic.   Cardiovascular:      Rate and Rhythm: Normal rate and regular rhythm.   Pulmonary:      Effort: Pulmonary effort is normal.      Breath sounds: Normal breath sounds.   Skin:     General: Skin is warm and dry.   Neurological:      General: No focal deficit present.      Mental Status: She is alert and oriented to person, place, and time.   Psychiatric:         Mood and Affect: Mood is anxious. Affect is tearful.         ED Course        Procedures              EKG Interpretation:      Interpreted by Vicky Donald DO  Time reviewed: 1005  Symptoms at time of EKG: Hypertension  Rhythm: normal sinus   Rate: Normal  Axis: Normal  Ectopy: none  Conduction: normal  ST Segments/ T Waves: No acute ischemic changes  Q Waves: none  Comparison to prior: Unchanged    Clinical Impression: no acute changes            Critical Care time:  none               Results for orders placed or performed during the hospital encounter of 08/02/24 (from the past 24 hour(s))   Comprehensive metabolic panel   Result Value Ref Range    Sodium 142 135 - 145 mmol/L    Potassium 4.1 3.4 - 5.3 mmol/L    Carbon Dioxide (CO2) 24 22 - 29 mmol/L    Anion Gap 12 7 - 15 mmol/L    Urea Nitrogen 10.8 8.0 - 23.0 mg/dL    Creatinine 1.05 (H) 0.51 - 0.95 mg/dL    GFR Estimate 56 (L) >60 mL/min/1.73m2    Calcium 9.2 8.8 - 10.4 mg/dL    Chloride 106 98 - 107 mmol/L    Glucose 124 (H) 70 - 99 mg/dL    Alkaline Phosphatase 50 40 - 150 U/L    AST 24 0 - 45 U/L    ALT 29 0 - 50 U/L    Protein Total 6.8 6.4 - 8.3 g/dL    Albumin 4.1 3.5 - 5.2 " g/dL    Bilirubin Total 0.4 <=1.2 mg/dL   CBC with platelets differential    Narrative    The following orders were created for panel order CBC with platelets differential.  Procedure                               Abnormality         Status                     ---------                               -----------         ------                     CBC with platelets and d...[[id number]]                      Final result                 Please view results for these tests on the individual orders.   Troponin T, High Sensitivity   Result Value Ref Range    Troponin T, High Sensitivity 11 <=14 ng/L   CBC with platelets and differential   Result Value Ref Range    WBC Count 6.7 4.0 - 11.0 10e3/uL    RBC Count 4.92 3.80 - 5.20 10e6/uL    Hemoglobin 13.9 11.7 - 15.7 g/dL    Hematocrit 41.6 35.0 - 47.0 %    MCV 85 78 - 100 fL    MCH 28.3 26.5 - 33.0 pg    MCHC 33.4 31.5 - 36.5 g/dL    RDW 14.3 10.0 - 15.0 %    Platelet Count 262 150 - 450 10e3/uL    % Neutrophils 65 %    % Lymphocytes 25 %    % Monocytes 7 %    % Eosinophils 2 %    % Basophils 1 %    % Immature Granulocytes 0 %    NRBCs per 100 WBC 0 <1 /100    Absolute Neutrophils 4.4 1.6 - 8.3 10e3/uL    Absolute Lymphocytes 1.7 0.8 - 5.3 10e3/uL    Absolute Monocytes 0.5 0.0 - 1.3 10e3/uL    Absolute Eosinophils 0.1 0.0 - 0.7 10e3/uL    Absolute Basophils 0.1 0.0 - 0.2 10e3/uL    Absolute Immature Granulocytes 0.0 <=0.4 10e3/uL    Absolute NRBCs 0.0 10e3/uL   XR Chest 2 Views    Narrative    CHEST TWO VIEWS 8/2/2024 10:24 AM     HISTORY: Chest pain, hypertension    COMPARISON: 4/8/2024       Impression    IMPRESSION: No acute cardiopulmonary abnormality.    JAYLAN ECHEVERRIA MD         SYSTEM ID:  [id number]       Medications   LORazepam (ATIVAN) injection 0.5 mg (0.5 mg Intravenous $Given 8/2/24 1023)       Assessments & Plan (with Medical Decision Making)  Lakeshia is a 73-year-old female presenting with concern of elevated blood pressure.  See history and physical exam as  above  73-year-old female in no acute respiratory distress, is hypertensive with blood pressure of 172/102, but otherwise vitally stable.  She is very tearful and anxious, gets worse when she tries to describe events leading up to her presentation today.  She is significantly hypertensive on arrival with blood pressure of 172/102.  Will get EKG, chest x-ray, and blood work to rule out acute process, but suspect that significant stressors and anxiety may be present patient's accelerated hypertension and symptoms.  Will try a dose of IV Ativan to see if this will help both blood pressure and her symptoms  EKG was performed and reviewed.  Shows normal sinus rhythm with no acute ST elevations, no ectopy, no arrhythmia.  When compared with previous EKG from April of this year it is unchanged.  Labs and imaging as above.  No acute worrisome findings to explain patient's symptoms.  Blood pressure was rechecked, and has improved to 154/81, and 130s over 60s when updating patient on findings.  She says she feels significantly better and all of her symptoms are gone after being given the Ativan.  Again, given no acute findings, I suspect that her symptoms were due to an acute stress reaction and anxiety.  Will provide with a small quantity of Xanax to utilize as needed if symptoms persist, but advised to use sparingly and with caution due to side effects.  She should follow closely with her primary provider.  Continue her regular medication regimen, as nothing else was changed today.  ED return precautions discussed.  Discharged in stable condition.     I have reviewed the nursing notes.    I have reviewed the findings, diagnosis, plan and need for follow up with the patient.           Medical Decision Making  The patient's presentation was of low complexity (an acute and uncomplicated illness or injury).    The patient's evaluation involved:  ordering and/or review of 3+ test(s) in this encounter (see separate area of note  for details)    The patient's management necessitated moderate risk (prescription drug management including medications given in the ED).        Discharge Medication List as of 8/2/2024 11:35 AM        START taking these medications    Details   ALPRAZolam (XANAX) 0.25 MG tablet Take 1 tablet (0.25 mg) by mouth 3 times daily as needed for anxiety, Disp-10 tablet, R-0, E-Prescribe             Final diagnoses:   Accelerated hypertension   Anxiety attack       8/2/2024   [facility] EMERGENCY DEPT       Vicky Donald DO  08/02/24 [id number]

## 2024-08-02 NOTE — TELEPHONE ENCOUNTER
S-(situation): Patient requesting to be working into schedule if possible for ED F/U and anxiety attack. .     B-(background): Patient sent MyChart for symptoms regarding ear pulsating and high BP. She presented to emergency department today. Was worked up and given lorazepam for anxiety. Has annual wellness scheduled 08/26.     A-(assessment): Patient at home doing better. Was given short script for lorazepam which patient states has helped. Her BP is controlled.    R-(recommendations): Please advise if patient can be worked into schedule prior to 08/26.       Lopez Payan RN on 8/2/2024 at 2:11 PM       Reason for Disposition   MILD anxiety symptoms (e.g., Anxiety symptoms are mild and intermittent; symptoms do not interfere with daily activities)    Protocols used: Anxiety and Panic Attack-A-[place]

## 2024-08-02 NOTE — ED NOTES
"Pt states Ativan has made her feel calmer and \"like I don't have to cry anymore.\" Pt encouraged to rest. Call light in reach.   "

## 2024-08-02 NOTE — DISCHARGE INSTRUCTIONS
Your blood work, EKG, chest x-ray did not show any acute worrisome findings.  Typically, do not see evidence of strain or stress on your heart to indicate a heart attack    Your blood pressure looks much better    I suspect your symptoms were accelerated due to anxiety and stress reaction.  This can certainly cause some of these symptoms you are experiencing    You may take 1 tablet of the Xanax up to 3 times daily as needed to help with any further symptoms.  Use caution, since this medicine can make you drowsy, do not drive or drink alcohol taking this medicine    You can continue all the rest of your medications as prescribed, no changes were made today    Follow-up closely with your primary doctor in clinic    If you develop any new or worsening symptoms do not hesitate to return to the emergency room for evaluation

## 2024-08-02 NOTE — TELEPHONE ENCOUNTER
Patient seen in ED, Phoned see triage. No further action.     Lopez Payan RN on 8/2/2024 at 2:12 PM

## 2024-08-02 NOTE — ED TRIAGE NOTES
PT presents with c/o elevated BP, reports hearing a loud pounding on her left ear, felt lightheaded this morning. PT proceeded to take her BP medicine (Carvedilol 6.25 mg) orally. Here for evaluation.

## 2024-08-03 NOTE — TELEPHONE ENCOUNTER
Please let patient know I can see her on Wednesday 8/7 at 12:45 PM arrival time. I do have this scheduled - please notify.     Gregoria Kumari PA-C

## 2024-08-05 NOTE — TELEPHONE ENCOUNTER
Called patient to notify her of below message from PCP. She states this appointment will work for her. She had no further questions or concerns.    LEONEL StubbsN, RN

## 2024-08-07 ENCOUNTER — OFFICE VISIT (OUTPATIENT)
Dept: FAMILY MEDICINE | Facility: CLINIC | Age: 74
End: 2024-08-07
Payer: MEDICARE

## 2024-08-07 VITALS
OXYGEN SATURATION: 99 % | DIASTOLIC BLOOD PRESSURE: 68 MMHG | RESPIRATION RATE: 16 BRPM | HEIGHT: 64 IN | SYSTOLIC BLOOD PRESSURE: 128 MMHG | BODY MASS INDEX: 37.41 KG/M2 | TEMPERATURE: 97.5 F | WEIGHT: 219.13 LBS | HEART RATE: 71 BPM

## 2024-08-07 DIAGNOSIS — F41.9 ANXIETY: ICD-10-CM

## 2024-08-07 DIAGNOSIS — I10 PRIMARY HYPERTENSION: Primary | ICD-10-CM

## 2024-08-07 PROCEDURE — G2211 COMPLEX E/M VISIT ADD ON: HCPCS | Performed by: PHYSICIAN ASSISTANT

## 2024-08-07 PROCEDURE — 99214 OFFICE O/P EST MOD 30 MIN: CPT | Performed by: PHYSICIAN ASSISTANT

## 2024-08-07 RX ORDER — CLONIDINE HYDROCHLORIDE 0.1 MG/1
TABLET ORAL
Qty: 20 TABLET | Refills: 1 | Status: SHIPPED | OUTPATIENT
Start: 2024-08-07

## 2024-08-07 RX ORDER — CLONIDINE HYDROCHLORIDE 0.1 MG/1
TABLET ORAL
Qty: 20 TABLET | Refills: 1 | Status: SHIPPED | OUTPATIENT
Start: 2024-08-07 | End: 2024-08-07

## 2024-08-07 RX ORDER — VENLAFAXINE HYDROCHLORIDE 37.5 MG/1
37.5 CAPSULE, EXTENDED RELEASE ORAL DAILY
Qty: 30 CAPSULE | Refills: 1 | Status: SHIPPED | OUTPATIENT
Start: 2024-08-07 | End: 2024-08-26

## 2024-08-07 NOTE — PROGRESS NOTES
Subjective   Lakeshia is a 73 year old, presenting for the following health issues:  ER F/U      8/7/2024    12:41 PM   Additional Questions   Roomed by Kianna KEY MA     [facility]     ED/UC Followup:    Facility:  [facility]  Date of visit: 8/2/24   Reason for visit: hypertension  Current Status: up and down    ER Course: Lakeshia is a 73-year-old female presenting with concern of elevated blood pressure.  See history and physical exam as above  73-year-old female in no acute respiratory distress, is hypertensive with blood pressure of 172/102, but otherwise vitally stable.  She is very tearful and anxious, gets worse when she tries to describe events leading up to her presentation today.  She is significantly hypertensive on arrival with blood pressure of 172/102.  Will get EKG, chest x-ray, and blood work to rule out acute process, but suspect that significant stressors and anxiety may be present patient's accelerated hypertension and symptoms.  Will try a dose of IV Ativan to see if this will help both blood pressure and her symptoms  EKG was performed and reviewed.  Shows normal sinus rhythm with no acute ST elevations, no ectopy, no arrhythmia.  When compared with previous EKG from April of this year it is unchanged.  Labs and imaging as above.  No acute worrisome findings to explain patient's symptoms.  Blood pressure was rechecked, and has improved to 154/81, and 130s over 60s when updating patient on findings.  She says she feels significantly better and all of her symptoms are gone after being given the Ativan.  Again, given no acute findings, I suspect that her symptoms were due to an acute stress reaction and anxiety.  Will provide with a small quantity of Xanax to utilize as needed if symptoms persist, but advised to use sparingly and with caution due to side effects.  She should follow closely with her primary provider.  Continue her regular medication regimen, as nothing else was changed  "today.  ED return precautions discussed.  Discharged in stable condition.    Patient reports she has been feeling OK. She does note different stressors in her life but really does not know why these episodes happen. She has been taking her blood pressure since and this has been good. She does note sleep quality is poor - wakes often with hot flashes still. She is open to considering seeing someone for her anxiety.       Review of Systems  Constitutional, HEENT, cardiovascular, pulmonary, GI, , musculoskeletal, neuro, skin, endocrine and psych systems are negative, except as otherwise noted.      Objective    /68   Pulse 71   Temp 97.5  F (36.4  C) (Temporal)   Resp 16   Ht 1.626 m (5' 4\")   Wt 99.4 kg (219 lb 2 oz)   SpO2 99%   BMI 37.61 kg/m    Body mass index is 37.61 kg/m .  Physical Exam   GENERAL: alert and no distress  NECK: no adenopathy, no asymmetry, masses, or scars  RESP: lungs clear to auscultation - no rales, rhonchi or wheezes  CV: regular rate and rhythm, normal S1 S2, no S3 or S4, no murmur, click or rub, no peripheral edema   MS: no gross musculoskeletal defects noted, no edema  SKIN: no suspicious lesions or rashes  PSYCH: mentation appears normal, affect normal/bright        Assessment & Plan     Primary hypertension  Blood pressure doing well today. Elevations likely more anxiety related. I did give her a small supply of Clonidine she can take as needed for elevated blood pressure only due her concern of taking the Xanax.   - cloNIDine (CATAPRES) 0.1 MG tablet; Take only as needed for elevated blood pressure    Anxiety  Will start Effexor today as this will likely benefit anxiety and hot flashes.  Appropriate use, side effects and dose titration if needed discussed. Mental health referral also placed.   - venlafaxine (EFFEXOR XR) 37.5 MG 24 hr capsule; Take 1 capsule (37.5 mg) by mouth daily  - Adult Mental Health  Referral; Future    MED REC REQUIRED  Post Medication " Reconciliation Status: discharge medications reconciled, continue medications without change    The patient indicates understanding of these issues and agrees with the plan.    Signed Electronically by: Gregoria Kumari PA-C

## 2024-08-21 ENCOUNTER — MYC REFILL (OUTPATIENT)
Dept: FAMILY MEDICINE | Facility: CLINIC | Age: 74
End: 2024-08-21
Payer: MEDICARE

## 2024-08-21 DIAGNOSIS — M47.26 OSTEOARTHRITIS OF SPINE WITH RADICULOPATHY, LUMBAR REGION: ICD-10-CM

## 2024-08-22 RX ORDER — HYDROCODONE BITARTRATE AND ACETAMINOPHEN 5; 325 MG/1; MG/1
1 TABLET ORAL EVERY 6 HOURS PRN
Qty: 30 TABLET | Refills: 0 | Status: SHIPPED | OUTPATIENT
Start: 2024-08-22 | End: 2024-09-24

## 2024-08-26 ENCOUNTER — OFFICE VISIT (OUTPATIENT)
Dept: FAMILY MEDICINE | Facility: CLINIC | Age: 74
End: 2024-08-26
Payer: MEDICARE

## 2024-08-26 VITALS
WEIGHT: 215 LBS | DIASTOLIC BLOOD PRESSURE: 74 MMHG | HEIGHT: 64 IN | TEMPERATURE: 97.6 F | RESPIRATION RATE: 15 BRPM | HEART RATE: 67 BPM | SYSTOLIC BLOOD PRESSURE: 126 MMHG | OXYGEN SATURATION: 99 % | BODY MASS INDEX: 36.7 KG/M2

## 2024-08-26 DIAGNOSIS — E04.1 THYROID NODULE: ICD-10-CM

## 2024-08-26 DIAGNOSIS — Z78.0 ASYMPTOMATIC POSTMENOPAUSAL STATUS: ICD-10-CM

## 2024-08-26 DIAGNOSIS — I10 PRIMARY HYPERTENSION: ICD-10-CM

## 2024-08-26 DIAGNOSIS — Z00.00 ENCOUNTER FOR MEDICARE ANNUAL WELLNESS EXAM: Primary | ICD-10-CM

## 2024-08-26 DIAGNOSIS — R10.9 FLANK PAIN: ICD-10-CM

## 2024-08-26 DIAGNOSIS — F41.9 ANXIETY: ICD-10-CM

## 2024-08-26 LAB
ALBUMIN UR-MCNC: NEGATIVE MG/DL
APPEARANCE UR: CLEAR
BILIRUB UR QL STRIP: NEGATIVE
COLOR UR AUTO: YELLOW
GLUCOSE UR STRIP-MCNC: NEGATIVE MG/DL
HGB UR QL STRIP: ABNORMAL
KETONES UR STRIP-MCNC: NEGATIVE MG/DL
LEUKOCYTE ESTERASE UR QL STRIP: NEGATIVE
NITRATE UR QL: NEGATIVE
PH UR STRIP: 6 [PH] (ref 5–7)
SP GR UR STRIP: 1.01 (ref 1–1.03)
TSH SERPL DL<=0.005 MIU/L-ACNC: 2.37 UIU/ML (ref 0.3–4.2)
UROBILINOGEN UR STRIP-MCNC: NORMAL MG/DL

## 2024-08-26 PROCEDURE — 84443 ASSAY THYROID STIM HORMONE: CPT | Performed by: PHYSICIAN ASSISTANT

## 2024-08-26 PROCEDURE — 36415 COLL VENOUS BLD VENIPUNCTURE: CPT | Performed by: PHYSICIAN ASSISTANT

## 2024-08-26 PROCEDURE — 81003 URINALYSIS AUTO W/O SCOPE: CPT | Performed by: PHYSICIAN ASSISTANT

## 2024-08-26 PROCEDURE — 99214 OFFICE O/P EST MOD 30 MIN: CPT | Mod: 25 | Performed by: PHYSICIAN ASSISTANT

## 2024-08-26 PROCEDURE — G0439 PPPS, SUBSEQ VISIT: HCPCS | Performed by: PHYSICIAN ASSISTANT

## 2024-08-26 RX ORDER — VENLAFAXINE HYDROCHLORIDE 37.5 MG/1
37.5 CAPSULE, EXTENDED RELEASE ORAL DAILY
Qty: 90 CAPSULE | Refills: 3 | Status: SHIPPED | OUTPATIENT
Start: 2024-08-26 | End: 2024-09-24

## 2024-08-26 ASSESSMENT — PAIN SCALES - GENERAL: PAINLEVEL: NO PAIN (0)

## 2024-08-26 NOTE — PROGRESS NOTES
Dr. Potter rounded on pt. Per MD ford to be discharged home today. MD aware to call in abili to pt. Pharmacy.   Dr. Waldrop paged with call back. Per MD ford to be discharged home from her standpoint.   Discharge education completed at bedside. Pt. Verbalized understanding. Instructions given over the phone with wife as well. Wife verbalized understanding.    All belongings sent home with pt.   Pt. Picked up at [facility].    Preventive Care Visit  formerly [facility]  Gregoria Kumari PA-C, Family Medicine  Aug 26, 2024        Lucía Asher is a 73 year old, presenting for the following:  Physical        8/26/2024    12:36 PM   Additional Questions   Roomed by Denise         8/26/2024    12:36 PM   Patient Reported Additional Medications   Patient reports taking the following new medications none         Health Care Directive  Patient does not have a Health Care Directive or Living Will: Patient states has Advance Directive and will bring in a copy to clinic.    HPI  Patient presents for an annual physical. Overall the patient is well with no concerns, besides a small nodule on the right upper lobe of her thyroid. She has a history of thyroid nodules, TSH levels and thyroid ultrasound ordered. No other concerns.        8/21/2024   General Health   How would you rate your overall physical health? (!) FAIR   Feel stress (tense, anxious, or unable to sleep) To some extent      (!) STRESS CONCERN      8/21/2024   Nutrition   Diet: Regular (no restrictions)            8/21/2024   Exercise   Days per week of moderate/strenous exercise 3 days   Average minutes spent exercising at this level 20 min            8/21/2024   Social Factors   Frequency of gathering with friends or relatives Once a week   Worry food won't last until get money to buy more No   Food not last or not have enough money for food? No   Do you have housing? (Housing is defined as stable permanent housing and does not include staying ouside in a car, in a tent, in an abandoned building, in an overnight shelter, or couch-surfing.) Yes   Are you worried about losing your housing? No   Lack of transportation? No   Unable to get utilities (heat,electricity)? No            8/21/2024   Fall Risk   Fallen 2 or more times in the past year? No   Trouble with walking or balance? No             8/21/2024   Activities of Daily Living- Home Safety   Needs help  with the following daily activites None of the above   Safety concerns in the home None of the above            8/21/2024   Dental   Dentist two times every year? Yes            8/21/2024   Hearing Screening   Hearing concerns? (!) IT'S HARD TO FOLLOW A CONVERSATION IN A NOISY RESTAURANT OR CROWDED ROOM.    (!) TROUBLE FOLLOWING DIALOGUE IN THE THEATHER.    (!) TROUBLE UNDERSTANDING SOFT OR WHISPERED SPEECH.       Multiple values from one day are sorted in reverse-chronological order         8/21/2024   Driving Risk Screening   Patient/family members have concerns about driving No            8/21/2024   General Alertness/Fatigue Screening   Have you been more tired than usual lately? (!) YES            8/21/2024   Urinary Incontinence Screening   Bothered by leaking urine in past 6 months No            8/21/2024   TB Screening   Were you born outside of the [place]? No            Today's PHQ-2 Score:       8/25/2024     4:24 PM   PHQ-2 ( 1999 [employer])   Q1: Little interest or pleasure in doing things 0   Q2: Feeling down, depressed or hopeless 0   PHQ-2 Score 0   Q1: Little interest or pleasure in doing things Not at all   Q2: Feeling down, depressed or hopeless Not at all   PHQ-2 Score 0           8/21/2024   Substance Use   Alcohol more than 3/day or more than 7/wk No   Do you have a current opioid prescription? (!) YES   How severe/bad is pain from 1 to 10? 5/10   Do you use any other substances recreationally? No           No data to display              Low Risk (0-3)  Moderate Risk (4-7)  High Risk (>8)  Social History     Tobacco Use    Smoking status: Never    Smokeless tobacco: Never   Vaping Use    Vaping status: Never Used   Substance Use Topics    Alcohol use: Yes     Comment: socially on occasion    Drug use: Never           4/4/2024   LAST FHS-7 RESULTS   1st degree relative breast or ovarian cancer No   Any relative bilateral breast cancer No   Any male have breast cancer No   Any ONE woman have BOTH breast  AND ovarian cancer No   Any woman with breast cancer before 50yrs No   2 or more relatives with breast AND/OR ovarian cancer No   2 or more relatives with breast AND/OR bowel cancer No           Mammogram Screening - Mammogram every 1-2 years updated in Health Maintenance based on mutual decision making    ASCVD Risk   The ASCVD Risk score (Mary Kay DUVAL, et al., 2019) failed to calculate for the following reasons:    The valid HDL cholesterol range is 20 to 100 mg/dL          Reviewed and updated as needed this visit by Provider                    BP Readings from Last 3 Encounters:   08/26/24 126/74   08/07/24 128/68   08/02/24 (!) 154/81    Wt Readings from Last 3 Encounters:   08/26/24 97.5 kg (215 lb)   08/07/24 99.4 kg (219 lb 2 oz)   08/02/24 99.3 kg (219 lb)                  Patient Active Problem List   Diagnosis    High cholesterol    HTN (hypertension)    Vitamin B12 deficiency    Obesity (BMI 35.0-39.9) with comorbidity (H)    Vitamin D deficiency    DDD (degenerative disc disease), lumbar    Osteoarthritis of spine with radiculopathy, lumbar region    Psychophysiological insomnia    Gastroesophageal reflux disease with esophagitis    Controlled substance agreement signed    Elevated hemoglobin A1c     Past Surgical History:   Procedure Laterality Date    BIOPSY  2018    Hysterectomy    CATARACT IOL, RT/LT Bilateral 2015    COLONOSCOPY  2016    Biopsy negitive    ENT SURGERY  1956    HYSTERECTOMY      HYSTERECTOMY, PAP NO LONGER INDICATED      ORTHOPEDIC SURGERY  2016    both wrist Carpal tunnel surgery       Social History     Tobacco Use    Smoking status: Never    Smokeless tobacco: Never   Substance Use Topics    Alcohol use: Yes     Comment: socially on occasion     Family History   Problem Relation Age of Onset    Pancreatic Cancer Mother     Hypertension Mother     Obesity Mother     Diabetes Mother     ALS Father     Coronary Artery Disease Father         Valve replacement. Aortic valve  calcification    Hyperlipidemia Father     Obesity Father     Obesity Maternal Grandmother     Glaucoma Maternal Grandfather     Atrial fibrillation Brother     Glaucoma Other     Coronary Artery Disease Brother         AFIB    Hypertension Brother     Hyperlipidemia Brother     Obesity Brother     Colon Cancer Cousin     Macular Degeneration No family hx of          Current Outpatient Medications   Medication Sig Dispense Refill    venlafaxine (EFFEXOR XR) 37.5 MG 24 hr capsule Take 1 capsule (37.5 mg) by mouth daily. 90 capsule 3    ALPRAZolam (XANAX) 0.25 MG tablet Take 1 tablet (0.25 mg) by mouth 3 times daily as needed for anxiety 10 tablet 0    atorvastatin (LIPITOR) 20 MG tablet Take 1 tablet (20 mg) by mouth daily 90 tablet 3    carvedilol (COREG) 6.25 MG tablet Take 1 tablet (6.25 mg) by mouth 2 times daily (with meals) 180 tablet 3    cloNIDine (CATAPRES) 0.1 MG tablet Take one tablet twice daily as needed for elevated blood pressure 20 tablet 1    Coenzyme Q10 (COQ10 PO) 1 tablet daily      Cyanocobalamin (B-12 PO) Take 1 tablet by mouth daily      famotidine (PEPCID) 20 MG tablet TAKE 1 TABLET TWICE A  tablet 0    HYDROcodone-acetaminophen (NORCO) 5-325 MG tablet Take 1 tablet by mouth every 6 hours as needed for severe pain. 30 tablet 0    losartan (COZAAR) 50 MG tablet TAKE 2 TABLETS ONCE DAILY 180 tablet 3    LYSINE PO Take 1 tablet by mouth 2 times daily      pantoprazole (PROTONIX) 40 MG EC tablet Take 1 tablet (40 mg) by mouth daily 90 tablet 3    TURMERIC PO Take 1 capsule by mouth daily      Vitamin D, Cholecalciferol, 25 MCG (1000 UT) CAPS 1 capsule daily       Current providers sharing in care for this patient include:  Patient Care Team:  Gregoria Kumari PA-C as PCP - General (Family Medicine)  Daniel Puentes MD as MD (Dermapathology)  Bridget Holder APRN CNP as Referring Physician (Family Medicine)  Gregoria Kumari PA-C as Assigned PCP  Gregoria Kumari PA-C as Assigned  "Pain Medication Provider  Denisse Rowell, LAINE (Optometry)  Herb Tony MD as MD (Otolaryngology)  Ashley Devi MD as Assigned Surgical Provider    The following health maintenance items are reviewed in Epic and correct as of today:  Health Maintenance   Topic Date Due    MEDICARE ANNUAL WELLNESS VISIT  03/15/2024    COVID-19 Vaccine (7 - 2023-24 season) 03/23/2024    INFLUENZA VACCINE (1) 09/01/2024    ANNUAL REVIEW OF HM ORDERS  04/02/2025    LIPID  04/15/2025    BMP  08/02/2025    FALL RISK ASSESSMENT  08/26/2025    MAMMO SCREENING  04/04/2026    ADVANCE CARE PLANNING  01/21/2027    COLORECTAL CANCER SCREENING  02/10/2027    GLUCOSE  08/02/2027    DTAP/TDAP/TD IMMUNIZATION (2 - Td or Tdap) 04/30/2029    DEXA  11/11/2034    HEPATITIS C SCREENING  Completed    PHQ-2 (once per calendar year)  Completed    Pneumococcal Vaccine: 65+ Years  Completed    ZOSTER IMMUNIZATION  Completed    RSV VACCINE (Pregnancy & 60+)  Completed    HPV IMMUNIZATION  Aged Out    MENINGITIS IMMUNIZATION  Aged Out    RSV MONOCLONAL ANTIBODY  Aged Out         Review of Systems  Constitutional, HEENT, cardiovascular, pulmonary, gi and gu systems are negative, except as otherwise noted.     Objective    Exam  BP (!) 146/84 (BP Location: Right arm, Patient Position: Sitting, Cuff Size: Adult Large)   Pulse 67   Temp 97.6  F (36.4  C) (Oral)   Resp 15   Ht 1.618 m (5' 3.7\")   Wt 97.5 kg (215 lb)   SpO2 99%   BMI 37.25 kg/m     Estimated body mass index is 37.25 kg/m  as calculated from the following:    Height as of this encounter: 1.618 m (5' 3.7\").    Weight as of this encounter: 97.5 kg (215 lb).    Physical Exam  GENERAL: alert and no distress  EYES: Eyes grossly normal to inspection, PERRL and conjunctivae and sclerae normal  HENT: ear canals and TM's normal, nose and mouth without ulcers or lesions  NECK: small painless nodule on right upper lobe of thyroid, no adenopathy, no asymmetry, or scars  RESP: lungs clear to " "auscultation - no rales, rhonchi or wheezes  CV: regular rate and rhythm, normal S1 S2, no S3 or S4, no murmur, click or rub, no peripheral edema  ABDOMEN: soft, nontender, no hepatosplenomegaly, no masses and bowel sounds normal  MS: no gross musculoskeletal defects noted, no edema         8/26/2024   Mini Cog   Clock Draw Score 2 Normal   3 Item Recall 2 objects recalled   Mini Cog Total Score 4             Assessment & Plan     Encounter for Medicare annual wellness exam    Primary hypertension  Stable and maintained with medications    Anxiety  Improved and maintained with medication and therapy  - venlafaxine (EFFEXOR XR) 37.5 MG 24 hr capsule; Take 1 capsule (37.5 mg) by mouth daily.    Flank pain  Pending UA results  - UA Macroscopic with reflex to Microscopic and Culture - Lab Collect; Future  - UA Macroscopic with reflex to Microscopic and Culture - Lab Collect    Thyroid nodule  Ultrasound referral provided, pending results  - TSH with free T4 reflex; Future  - US Thyroid; Future  - TSH with free T4 reflex    Asymptomatic postmenopausal status  - DX Bone Density; Future    Patient has been advised of split billing requirements and indicates understanding: Yes        BMI  Estimated body mass index is 37.25 kg/m  as calculated from the following:    Height as of this encounter: 1.618 m (5' 3.7\").    Weight as of this encounter: 97.5 kg (215 lb).   Weight management plan: Discussed healthy diet and exercise guidelines    Counseling  Appropriate preventive services were addressed with this patient via screening, questionnaire, or discussion as appropriate for fall prevention, nutrition, physical activity, Tobacco-use cessation, social engagement, weight loss and cognition.  Checklist reviewing preventive services available has been given to the patient.  Reviewed patient's diet, addressing concerns and/or questions.   She is at risk for lack of exercise and has been provided with information to increase " physical activity for the benefit of her well-being.   Discussed possible causes of fatigue. The patient was provided with written information regarding signs of hearing loss.   I have reviewed Opioid Use Disorder and Substance Use Disorder risk factors and made any needed referrals.         Signed Electronically by: Gregoria Kumari PA-C

## 2024-08-26 NOTE — PATIENT INSTRUCTIONS
Patient Education   Preventive Care Advice   This is general advice given by our system to help you stay healthy. However, your care team may have specific advice just for you. Please talk to your care team about your preventive care needs.  Nutrition  Eat 5 or more servings of fruits and vegetables each day.  Try wheat bread, brown rice and whole grain pasta (instead of white bread, rice, and pasta).  Get enough calcium and vitamin D. Check the label on foods and aim for 100% of the RDA (recommended daily allowance).  Lifestyle  Exercise at least 150 minutes each week  (30 minutes a day, 5 days a week).  Do muscle strengthening activities 2 days a week. These help control your weight and prevent disease.  No smoking.  Wear sunscreen to prevent skin cancer.  Have a dental exam and cleaning every 6 months.  Yearly exams  See your health care team every year to talk about:  Any changes in your health.  Any medicines your care team has prescribed.  Preventive care, family planning, and ways to prevent chronic diseases.  Shots (vaccines)   HPV shots (up to age 26), if you've never had them before.  Hepatitis B shots (up to age 59), if you've never had them before.  COVID-19 shot: Get this shot when it's due.  Flu shot: Get a flu shot every year.  Tetanus shot: Get a tetanus shot every 10 years.  Pneumococcal, hepatitis A, and RSV shots: Ask your care team if you need these based on your risk.  Shingles shot (for age 50 and up)  General health tests  Diabetes screening:  Starting at age 35, Get screened for diabetes at least every 3 years.  If you are younger than age 35, ask your care team if you should be screened for diabetes.  Cholesterol test: At age 39, start having a cholesterol test every 5 years, or more often if advised.  Bone density scan (DEXA): At age 50, ask your care team if you should have this scan for osteoporosis (brittle bones).  Hepatitis C: Get tested at least once in your life.  STIs (sexually  transmitted infections)  Before age 24: Ask your care team if you should be screened for STIs.  After age 24: Get screened for STIs if you're at risk. You are at risk for STIs (including HIV) if:  You are sexually active with more than one person.  You don't use condoms every time.  You or a partner was diagnosed with a sexually transmitted infection.  If you are at risk for HIV, ask about PrEP medicine to prevent HIV.  Get tested for HIV at least once in your life, whether you are at risk for HIV or not.  Cancer screening tests  Cervical cancer screening: If you have a cervix, begin getting regular cervical cancer screening tests starting at age 21.  Breast cancer scan (mammogram): If you've ever had breasts, begin having regular mammograms starting at age 40. This is a scan to check for breast cancer.  Colon cancer screening: It is important to start screening for colon cancer at age 45.  Have a colonoscopy test every 10 years (or more often if you're at risk) Or, ask your provider about stool tests like a FIT test every year or Cologuard test every 3 years.  To learn more about your testing options, visit:   .  For help making a decision, visit:   https://bit.ly/si03960.  Prostate cancer screening test: If you have a prostate, ask your care team if a prostate cancer screening test (PSA) at age 55 is right for you.  Lung cancer screening: If you are a current or former smoker ages 50 to 80, ask your care team if ongoing lung cancer screenings are right for you.  For informational purposes only. Not to replace the advice of your health care provider. Copyright   2023 [facility]. All rights reserved. Clinically reviewed by the [facility]. [employer] [id number] - REV 01/24.  Hearing Loss: Care Instructions  Overview     Hearing loss is a sudden or slow decrease in how well you hear. It can range from slight to profound. Permanent hearing loss can occur with aging. It also can  happen when you are exposed long-term to loud noise. Examples include listening to loud music, riding motorcycles, or being around other loud machines.  Hearing loss can affect your work and home life. It can make you feel lonely or depressed. You may feel that you have lost your independence. But hearing aids and other devices can help you hear better and feel connected to others.  Follow-up care is a key part of your treatment and safety. Be sure to make and go to all appointments, and call your doctor if you are having problems. It's also a good idea to know your test results and keep a list of the medicines you take.  How can you care for yourself at home?  Avoid loud noises whenever possible. This helps keep your hearing from getting worse.  Always wear hearing protection around loud noises.  Wear a hearing aid as directed.  A professional can help you pick a hearing aid that will work best for you.  You can also get hearing aids over the counter for mild to moderate hearing loss.  Have hearing tests as your doctor suggests. They can show whether your hearing has changed. Your hearing aid may need to be adjusted.  Use other devices as needed. These may include:  Telephone amplifiers and hearing aids that can connect to a television, stereo, radio, or microphone.  Devices that use lights or vibrations. These alert you to the doorbell, a ringing telephone, or a baby monitor.  Television closed-captioning. This shows the words at the bottom of the screen. Most new TVs can do this.  TTY (text telephone). This lets you type messages back and forth on the telephone instead of talking or listening. These devices are also called TDD. When messages are typed on the keyboard, they are sent over the phone line to a receiving TTY. The message is shown on a monitor.  Use text messaging, social media, and email if it is hard for you to communicate by telephone.  Try to learn a listening technique called speechreading. It is  "not lipreading. You pay attention to people's gestures, expressions, posture, and tone of voice. These clues can help you understand what a person is saying. Face the person you are talking to, and have them face you. Make sure the lighting is good. You need to see the other person's face clearly.  Think about counseling if you need help to adjust to your hearing loss.  When should you call for help?  Watch closely for changes in your health, and be sure to contact your doctor if:    You think your hearing is getting worse.     You have new symptoms, such as dizziness or nausea.   Where can you learn more?  Go to https://www.[employer]elle[employer].net/patiented  Enter R798 in the search box to learn more about \"Hearing Loss: Care Instructions.\"  Current as of: September 27, 2023               Content Version: 14.0    5818-6265 [employer].   Care instructions adapted under license by your healthcare professional. If you have questions about a medical condition or this instruction, always ask your healthcare professional. [employer] disclaims any warranty or liability for your use of this information.      Learning About Sleeping Well  What does sleeping well mean?     Sleeping well means getting enough sleep to feel good and stay healthy. How much sleep is enough varies among people.  The number of hours you sleep and how you feel when you wake up are both important. If you do not feel refreshed, you probably need more sleep. Another sign of not getting enough sleep is feeling tired during the day.  Experts recommend that adults get at least 7 or more hours of sleep per day. Children and older adults need more sleep.  Why is getting enough sleep important?  Getting enough quality sleep is a basic part of good health. When your sleep suffers, your physical health, mood, and your thoughts can suffer too. You may find yourself feeling more grumpy or stressed. Not getting enough sleep also can lead to " "serious problems, including injury, accidents, anxiety, and depression.  What might cause poor sleeping?  Many things can cause sleep problems, including:  Changes to your sleep schedule.  Stress. Stress can be caused by fear about a single event, such as giving a speech. Or you may have ongoing stress, such as worry about work or school.  Depression, anxiety, and other mental or emotional conditions.  Changes in your sleep habits or surroundings. This includes changes that happen where you sleep, such as noise, light, or sleeping in a different bed. It also includes changes in your sleep pattern, such as having jet lag or working a late shift.  Health problems, such as pain, breathing problems, and restless legs syndrome.  Lack of regular exercise.  Using alcohol, nicotine, or caffeine before bed.  How can you help yourself?  Here are some tips that may help you sleep more soundly and wake up feeling more refreshed.  Your sleeping area   Use your bedroom only for sleeping and sex. A bit of light reading may help you fall asleep. But if it doesn't, do your reading elsewhere in the house. Try not to use your TV, computer, smartphone, or tablet while you are in bed.  Be sure your bed is big enough to stretch out comfortably, especially if you have a sleep partner.  Keep your bedroom quiet, dark, and cool. Use curtains, blinds, or a sleep mask to block out light. To block out noise, use earplugs, soothing music, or a \"white noise\" machine.  Your evening and bedtime routine   Create a relaxing bedtime routine. You might want to take a warm shower or bath, or listen to soothing music.  Go to bed at the same time every night. And get up at the same time every morning, even if you feel tired.  What to avoid   Limit caffeine (coffee, tea, caffeinated sodas) during the day, and don't have any for at least 6 hours before bedtime.  Avoid drinking alcohol before bedtime. Alcohol can cause you to wake up more often during the " "night.  Try not to smoke or use tobacco, especially in the evening. Nicotine can keep you awake.  Limit naps during the day, especially close to bedtime.  Avoid lying in bed awake for too long. If you can't fall asleep or if you wake up in the middle of the night and can't get back to sleep within about 20 minutes, get out of bed and go to another room until you feel sleepy.  Avoid taking medicine right before bed that may keep you awake or make you feel hyper or energized. Your doctor can tell you if your medicine may do this and if you can take it earlier in the day.  If you can't sleep   Imagine yourself in a peaceful, pleasant scene. Focus on the details and feelings of being in a place that is relaxing.  Get up and do a quiet or boring activity until you feel sleepy.  Avoid drinking any liquids before going to bed to help prevent waking up often to use the bathroom.  Where can you learn more?  Go to https://www.[employer].net/patiented  Enter J942 in the search box to learn more about \"Learning About Sleeping Well.\"  Current as of: July 10, 2023  Content Version: 14.1 2006-2024 [employer].   Care instructions adapted under license by your healthcare professional. If you have questions about a medical condition or this instruction, always ask your healthcare professional. [employer] disclaims any warranty or liability for your use of this information.    Chronic Pain: Care Instructions  Your Care Instructions     Chronic pain is pain that lasts a long time (months or even years) and may or may not have a clear cause. It is different from acute pain, which usually does have a clear cause--like an injury or illness--and gets better over time. Chronic pain:  Lasts over time but may vary from day to day.  Does not go away despite efforts to end it.  May disrupt your sleep and lead to fatigue.  May cause depression or anxiety.  May make your muscles tense, causing more pain.  Can " disrupt your work, hobbies, home life, and relationships with friends and family.  Chronic pain is a very real condition. It is not just in your head. Treatment can help and usually includes several methods used together, such as medicines, physical therapy, exercise, and other treatments. Learning how to relax and changing negative thought patterns can also help you cope.  Chronic pain is complex. Taking an active role in your treatment will help you better manage your pain. Tell your doctor if you have trouble dealing with your pain. You may have to try several things before you find what works best for you.  Follow-up care is a key part of your treatment and safety. Be sure to make and go to all appointments, and call your doctor if you are having problems. It's also a good idea to know your test results and keep a list of the medicines you take.  How can you care for yourself at home?  Pace yourself. Break up large jobs into smaller tasks. Save harder tasks for days when you have less pain, or go back and forth between hard tasks and easier ones. Take rest breaks.  Relax, and reduce stress. Relaxation techniques such as deep breathing or meditation can help.  Keep moving. Gentle, daily exercise can help reduce pain over the long run. Try low- or no-impact exercises such as walking, swimming, and stationary biking. Do stretches to stay flexible.  Try heat, cold packs, and massage.  Get enough sleep. Chronic pain can make you tired and drain your energy. Talk with your doctor if you have trouble sleeping because of pain.  Think positive. Your thoughts can affect your pain level. Do things that you enjoy to distract yourself when you have pain instead of focusing on the pain. See a movie, read a book, listen to music, or spend time with a friend.  If you think you are depressed, talk to your doctor about treatment.  Keep a daily pain diary. Record how your moods, thoughts, sleep patterns, activities, and medicine  affect your pain. You may find that your pain is worse during or after certain activities or when you are feeling a certain emotion. Having a record of your pain can help you and your doctor find the best ways to treat your pain.  Take pain medicines exactly as directed.  If the doctor gave you a prescription medicine for pain, take it as prescribed.  If you are not taking a prescription pain medicine, ask your doctor if you can take an over-the-counter medicine.  Reducing constipation caused by pain medicine  Talk to your doctor about a laxative. If a laxative doesn't work, your doctor may suggest a prescription medicine.  Include fruits, vegetables, beans, and whole grains in your diet each day. These foods are high in fiber.  If your doctor recommends it, get more exercise. Walking is a good choice. Bit by bit, increase the amount you walk every day. Try for at least 30 minutes on most days of the week.  Schedule time each day for a bowel movement. A daily routine may help. Take your time and do not strain when having a bowel movement.  When should you call for help?   Call your doctor now or seek immediate medical care if:    Your pain gets worse or is out of control.     You feel down or blue, or you do not enjoy things like you once did. You may be depressed, which is common in people with chronic pain. Depression can be treated.     You have vomiting or cramps for more than 2 hours.   Watch closely for changes in your health, and be sure to contact your doctor if:    You cannot sleep because of pain.     You are very worried or anxious about your pain.     You have trouble taking your pain medicine.     You have any concerns about your pain medicine.     You have trouble with bowel movements, such as:  No bowel movement in 3 days.  Blood in the anal area, in your stool, or on the toilet paper.  Diarrhea for more than 24 hours.   Where can you learn more?  Go to https://www.healthwise.net/patiented  Enter N004  "in the search box to learn more about \"Chronic Pain: Care Instructions.\"  Current as of: July 10, 2023               Content Version: 14.0    6042-2260 [employer].   Care instructions adapted under license by your healthcare professional. If you have questions about a medical condition or this instruction, always ask your healthcare professional. [employer] disclaims any warranty or liability for your use of this information.         "

## 2024-08-28 ENCOUNTER — HOSPITAL ENCOUNTER (OUTPATIENT)
Dept: BONE DENSITY | Facility: CLINIC | Age: 74
Discharge: HOME | End: 2024-08-28
Attending: PHYSICIAN ASSISTANT
Payer: MEDICARE

## 2024-08-28 ENCOUNTER — HOSPITAL ENCOUNTER (OUTPATIENT)
Dept: ULTRASOUND IMAGING | Facility: CLINIC | Age: 74
Discharge: HOME | End: 2024-08-28
Attending: PHYSICIAN ASSISTANT
Payer: MEDICARE

## 2024-08-28 DIAGNOSIS — Z78.0 ASYMPTOMATIC POSTMENOPAUSAL STATUS: ICD-10-CM

## 2024-08-28 DIAGNOSIS — E04.1 THYROID NODULE: ICD-10-CM

## 2024-08-28 PROCEDURE — 77080 DXA BONE DENSITY AXIAL: CPT

## 2024-08-28 PROCEDURE — 76536 US EXAM OF HEAD AND NECK: CPT

## 2024-09-24 ENCOUNTER — MYC REFILL (OUTPATIENT)
Dept: FAMILY MEDICINE | Facility: CLINIC | Age: 74
End: 2024-09-24
Payer: MEDICARE

## 2024-09-24 DIAGNOSIS — M47.26 OSTEOARTHRITIS OF SPINE WITH RADICULOPATHY, LUMBAR REGION: ICD-10-CM

## 2024-09-24 RX ORDER — HYDROCODONE BITARTRATE AND ACETAMINOPHEN 5; 325 MG/1; MG/1
1 TABLET ORAL EVERY 6 HOURS PRN
Qty: 30 TABLET | Refills: 0 | Status: SHIPPED | OUTPATIENT
Start: 2024-09-24

## 2024-10-08 ENCOUNTER — LAB (OUTPATIENT)
Dept: LAB | Facility: CLINIC | Age: 74
End: 2024-10-08
Payer: MEDICARE

## 2024-10-08 DIAGNOSIS — R31.29 MICROSCOPIC HEMATURIA: Primary | ICD-10-CM

## 2024-10-08 DIAGNOSIS — R31.29 MICROSCOPIC HEMATURIA: ICD-10-CM

## 2024-10-08 LAB
ALBUMIN UR-MCNC: NEGATIVE MG/DL
APPEARANCE UR: CLEAR
BACTERIA #/AREA URNS HPF: ABNORMAL /HPF
BILIRUB UR QL STRIP: NEGATIVE
COLOR UR AUTO: YELLOW
GLUCOSE UR STRIP-MCNC: NEGATIVE MG/DL
HGB UR QL STRIP: ABNORMAL
KETONES UR STRIP-MCNC: NEGATIVE MG/DL
LEUKOCYTE ESTERASE UR QL STRIP: ABNORMAL
MUCOUS THREADS #/AREA URNS LPF: PRESENT /LPF
NITRATE UR QL: NEGATIVE
PH UR STRIP: 5 [PH] (ref 5–7)
RBC URINE: <1 /HPF
SP GR UR STRIP: 1.01 (ref 1–1.03)
SQUAMOUS EPITHELIAL: 2 /HPF
UROBILINOGEN UR STRIP-MCNC: NORMAL MG/DL
WBC URINE: 3 /HPF

## 2024-10-08 PROCEDURE — 81001 URINALYSIS AUTO W/SCOPE: CPT

## 2024-10-21 ENCOUNTER — MYC REFILL (OUTPATIENT)
Dept: FAMILY MEDICINE | Facility: CLINIC | Age: 74
End: 2024-10-21
Payer: MEDICARE

## 2024-10-21 ENCOUNTER — MYC MEDICAL ADVICE (OUTPATIENT)
Dept: FAMILY MEDICINE | Facility: CLINIC | Age: 74
End: 2024-10-21
Payer: MEDICARE

## 2024-10-21 DIAGNOSIS — M47.26 OSTEOARTHRITIS OF SPINE WITH RADICULOPATHY, LUMBAR REGION: ICD-10-CM

## 2024-10-21 RX ORDER — HYDROCODONE BITARTRATE AND ACETAMINOPHEN 5; 325 MG/1; MG/1
1 TABLET ORAL EVERY 6 HOURS PRN
Qty: 30 TABLET | Refills: 0 | Status: SHIPPED | OUTPATIENT
Start: 2024-10-21

## 2024-10-28 DIAGNOSIS — K21.00 GASTROESOPHAGEAL REFLUX DISEASE WITH ESOPHAGITIS WITHOUT HEMORRHAGE: ICD-10-CM

## 2024-10-28 RX ORDER — FAMOTIDINE 20 MG/1
TABLET, FILM COATED ORAL
Qty: 180 TABLET | Refills: 3 | Status: SHIPPED | OUTPATIENT
Start: 2024-10-28

## 2024-11-05 ENCOUNTER — OFFICE VISIT (OUTPATIENT)
Dept: UROLOGY | Facility: CLINIC | Age: 74
End: 2024-11-05
Attending: PHYSICIAN ASSISTANT
Payer: MEDICARE

## 2024-11-05 VITALS
WEIGHT: 216 LBS | SYSTOLIC BLOOD PRESSURE: 132 MMHG | TEMPERATURE: 97.3 F | DIASTOLIC BLOOD PRESSURE: 79 MMHG | BODY MASS INDEX: 37.43 KG/M2

## 2024-11-05 DIAGNOSIS — R31.29 MICROSCOPIC HEMATURIA: Primary | ICD-10-CM

## 2024-11-05 LAB
ALBUMIN UR-MCNC: NEGATIVE MG/DL
APPEARANCE UR: ABNORMAL
BACTERIA #/AREA URNS HPF: ABNORMAL /HPF
BILIRUB UR QL STRIP: NEGATIVE
COLOR UR AUTO: YELLOW
GLUCOSE UR STRIP-MCNC: NEGATIVE MG/DL
HGB UR QL STRIP: ABNORMAL
KETONES UR STRIP-MCNC: NEGATIVE MG/DL
LEUKOCYTE ESTERASE UR QL STRIP: ABNORMAL
MUCOUS THREADS #/AREA URNS LPF: PRESENT /LPF
NITRATE UR QL: NEGATIVE
PH UR STRIP: 5 [PH] (ref 5–7)
RBC URINE: 1 /HPF
SP GR UR STRIP: 1.02 (ref 1–1.03)
SQUAMOUS EPITHELIAL: 2 /HPF
UROBILINOGEN UR STRIP-MCNC: NORMAL MG/DL
WBC URINE: 3 /HPF

## 2024-11-05 PROCEDURE — 87088 URINE BACTERIA CULTURE: CPT | Performed by: UROLOGY

## 2024-11-05 PROCEDURE — 99204 OFFICE O/P NEW MOD 45 MIN: CPT | Mod: 25 | Performed by: UROLOGY

## 2024-11-05 PROCEDURE — 52000 CYSTOURETHROSCOPY: CPT | Performed by: UROLOGY

## 2024-11-05 PROCEDURE — 81001 URINALYSIS AUTO W/SCOPE: CPT | Performed by: UROLOGY

## 2024-11-05 PROCEDURE — 87186 SC STD MICRODIL/AGAR DIL: CPT | Performed by: UROLOGY

## 2024-11-05 PROCEDURE — 87086 URINE CULTURE/COLONY COUNT: CPT | Performed by: UROLOGY

## 2024-11-05 ASSESSMENT — PAIN SCALES - GENERAL: PAINLEVEL_OUTOF10: NO PAIN (0)

## 2024-11-05 NOTE — PROGRESS NOTES
"Shelly Carcamo is a 74 year old female seen in consultation for microhematuria. Consult from Gregoria Kumari      Pt recently noted an \"unusual\" smell to her urine >> routine UA with \"blood cells\" >> referred here.     also with bladder tumor, on BCG, wife is concerned that she may have bladder tumor.    Pt with remote hx possible uric acid stone; never actually visualized.    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    Denies tobacco hx.    Denies dysuria, gross hematuria. Voids about q 90 minutes during the day, noc x 3 (moderate volumes).    Drinks 1 coffee, 1 tea, 6-8 Council per day \"to flush things out.\" Also drinks cranberry juice.    Has previously undergone full  eval for microhematuria (including cysto) with negative findings.     Hx 2 vag deliveries, hyster, not on HRT.     Some constipation; Special K and Cherios for breakfast.       Reviewed PMH in detail including HTN, anxiety, GERD, arthritis      Past Medical History:   Diagnosis Date    Cataract     Chronic osteoarthritis     Gastroesophageal reflux disease     Hyperlipidemia     Hypertension        Past Surgical History:   Procedure Laterality Date    BIOPSY  2018    Hysterectomy    CATARACT IOL, RT/LT Bilateral 2015    COLONOSCOPY  2016    Biopsy negitive    ENT SURGERY  1956    HYSTERECTOMY      HYSTERECTOMY, PAP NO LONGER INDICATED      ORTHOPEDIC SURGERY  2016    both wrist Carpal tunnel surgery       Social History     Socioeconomic History    Marital status:      Spouse name: Not on file    Number of children: Not on file    Years of education: Not on file    Highest education level: Not on file   Occupational History    Not on file   Tobacco Use    Smoking status: Never    Smokeless tobacco: Never   Vaping Use    Vaping status: Never Used   Substance and Sexual Activity    Alcohol use: Yes     Comment: socially on occasion    Drug use: Never    Sexual activity: Not Currently     Partners: Male     Birth " control/protection: Female Surgical   Other Topics Concern    Parent/sibling w/ CABG, MI or angioplasty before 65F 55M? Yes     Comment: mother & brother   Social History Narrative    Not on file     Social Drivers of Health     Financial Resource Strain: Low Risk  (8/21/2024)    Financial Resource Strain     Within the past 12 months, have you or your family members you live with been unable to get utilities (heat, electricity) when it was really needed?: No   Food Insecurity: Low Risk  (8/21/2024)    Food Insecurity     Within the past 12 months, did you worry that your food would run out before you got money to buy more?: No     Within the past 12 months, did the food you bought just not last and you didn t have money to get more?: No   Transportation Needs: Low Risk  (8/21/2024)    Transportation Needs     Within the past 12 months, has lack of transportation kept you from medical appointments, getting your medicines, non-medical meetings or appointments, work, or from getting things that you need?: No   Physical Activity: Insufficiently Active (8/21/2024)    Exercise Vital Sign     Days of Exercise per Week: 3 days     Minutes of Exercise per Session: 20 min   Stress: Stress Concern Present (8/21/2024)    [employer] - Occupational Stress Questionnaire     Feeling of Stress : To some extent   Social Connections: Unknown (8/21/2024)    Social Connection and Isolation Panel [NHANES]     Frequency of Communication with Friends and Family: Not on file     Frequency of Social Gatherings with Friends and Family: Once a week     Attends Faith Services: Not on file     Active Member of Clubs or Organizations: Not on file     Attends Club or Organization Meetings: Not on file     Marital Status: Not on file   Interpersonal Safety: Low Risk  (8/26/2024)    Interpersonal Safety     Do you feel physically and emotionally safe where you currently live?: Yes     Within the past 12 months,  have you been hit, slapped, kicked or otherwise physically hurt by someone?: No     Within the past 12 months, have you been humiliated or emotionally abused in other ways by your partner or ex-partner?: No   Housing Stability: Low Risk  (8/21/2024)    Housing Stability     Do you have housing? : Yes     Are you worried about losing your housing?: No       Current Outpatient Medications   Medication Sig Dispense Refill    ALPRAZolam (XANAX) 0.25 MG tablet Take 1 tablet (0.25 mg) by mouth 3 times daily as needed for anxiety 10 tablet 0    atorvastatin (LIPITOR) 20 MG tablet Take 1 tablet (20 mg) by mouth daily 90 tablet 3    carvedilol (COREG) 6.25 MG tablet Take 1 tablet (6.25 mg) by mouth 2 times daily (with meals) 180 tablet 3    cloNIDine (CATAPRES) 0.1 MG tablet Take one tablet twice daily as needed for elevated blood pressure 20 tablet 1    Coenzyme Q10 (COQ10 PO) 1 tablet daily      Cyanocobalamin (B-12 PO) Take 1 tablet by mouth daily      famotidine (PEPCID) 20 MG tablet TAKE 1 TABLET TWICE A  tablet 3    HYDROcodone-acetaminophen (NORCO) 5-325 MG tablet Take 1 tablet by mouth every 6 hours as needed for severe pain. 30 tablet 0    losartan (COZAAR) 50 MG tablet TAKE 2 TABLETS ONCE DAILY 180 tablet 3    LYSINE PO Take 1 tablet by mouth 2 times daily      pantoprazole (PROTONIX) 40 MG EC tablet Take 1 tablet (40 mg) by mouth daily 90 tablet 3    TURMERIC PO Take 1 capsule by mouth daily      venlafaxine (EFFEXOR XR) 75 MG 24 hr capsule Take 1 capsule (75 mg) by mouth daily. 90 capsule 1    Vitamin D, Cholecalciferol, 25 MCG (1000 UT) CAPS 1 capsule daily         Physical Exam:    GENL: NAD.                4/8/24 CT chest/abd/pelvis with contrast for chest pain, d dimer elevation, abd discomfort with diarrhea    benign            Results for orders placed or performed in visit on 11/05/24   Urine Macroscopic with reflex to Microscopic     Status: Abnormal   Result Value Ref Range    Color Urine Yellow  Colorless, Straw, Light Yellow, Yellow    Appearance Urine Slightly Cloudy (A) Clear    Glucose Urine Negative Negative mg/dL    Bilirubin Urine Negative Negative    Ketones Urine Negative Negative mg/dL    Specific Gravity Urine 1.018 1.003 - 1.035    Blood Urine Small (A) Negative    pH Urine 5.0 5.0 - 7.0    Protein Albumin Urine Negative Negative mg/dL    Urobilinogen Urine Normal Normal, 2.0 mg/dL    Nitrite Urine Negative Negative    Leukocyte Esterase Urine Trace (A) Negative    Bacteria Urine Many (A) None Seen /HPF    RBC Urine 1 <=2 /HPF    WBC Urine 3 <=5 /HPF    Squamous Epithelials Urine 2 (H) <=1 /HPF    Mucus Urine Present (A) None Seen /LPF         IMP:  1. Minimal microhematuria, below threshold for w/up  2. Chronic constipation  3. Large fluids      PLAN:  Discussed situation with patient in detail.  No indication for addiitional  eval at this point  Consider some moderation of [place], increase dietary fiber  RTC to us only PRN  Total time spent in reviewing patient records, discussing history, performing exam, discussing diagnosis, outlining treatment plan and documentation: 60 minutes

## 2024-11-07 DIAGNOSIS — R31.29 MICROSCOPIC HEMATURIA: Primary | ICD-10-CM

## 2024-11-07 LAB — BACTERIA UR CULT: ABNORMAL

## 2024-11-07 RX ORDER — SULFAMETHOXAZOLE AND TRIMETHOPRIM 800; 160 MG/1; MG/1
1 TABLET ORAL 2 TIMES DAILY
Qty: 6 TABLET | Refills: 0 | Status: SHIPPED | OUTPATIENT
Start: 2024-11-07 | End: 2024-11-10

## 2024-11-14 ENCOUNTER — MYC REFILL (OUTPATIENT)
Dept: FAMILY MEDICINE | Facility: CLINIC | Age: 74
End: 2024-11-14
Payer: MEDICARE

## 2024-11-14 DIAGNOSIS — M47.26 OSTEOARTHRITIS OF SPINE WITH RADICULOPATHY, LUMBAR REGION: ICD-10-CM

## 2024-11-15 RX ORDER — HYDROCODONE BITARTRATE AND ACETAMINOPHEN 5; 325 MG/1; MG/1
1 TABLET ORAL EVERY 6 HOURS PRN
Qty: 30 TABLET | Refills: 0 | Status: SHIPPED | OUTPATIENT
Start: 2024-11-15

## 2024-12-09 ENCOUNTER — MYC REFILL (OUTPATIENT)
Dept: FAMILY MEDICINE | Facility: CLINIC | Age: 74
End: 2024-12-09
Payer: MEDICARE

## 2024-12-09 DIAGNOSIS — M47.26 OSTEOARTHRITIS OF SPINE WITH RADICULOPATHY, LUMBAR REGION: ICD-10-CM

## 2024-12-10 RX ORDER — HYDROCODONE BITARTRATE AND ACETAMINOPHEN 5; 325 MG/1; MG/1
1 TABLET ORAL EVERY 6 HOURS PRN
Qty: 30 TABLET | Refills: 0 | Status: SHIPPED | OUTPATIENT
Start: 2024-12-10

## 2025-01-05 ENCOUNTER — MYC REFILL (OUTPATIENT)
Dept: FAMILY MEDICINE | Facility: CLINIC | Age: 75
End: 2025-01-05
Payer: MEDICARE

## 2025-01-05 DIAGNOSIS — M47.26 OSTEOARTHRITIS OF SPINE WITH RADICULOPATHY, LUMBAR REGION: ICD-10-CM

## 2025-01-06 RX ORDER — HYDROCODONE BITARTRATE AND ACETAMINOPHEN 5; 325 MG/1; MG/1
1 TABLET ORAL EVERY 6 HOURS PRN
Qty: 30 TABLET | Refills: 0 | Status: SHIPPED | OUTPATIENT
Start: 2025-01-06

## 2025-01-14 ENCOUNTER — MYC REFILL (OUTPATIENT)
Dept: FAMILY MEDICINE | Facility: CLINIC | Age: 75
End: 2025-01-14
Payer: MEDICARE

## 2025-01-14 DIAGNOSIS — F41.9 ANXIETY: ICD-10-CM

## 2025-01-14 RX ORDER — VENLAFAXINE HYDROCHLORIDE 75 MG/1
75 CAPSULE, EXTENDED RELEASE ORAL DAILY
Qty: 90 CAPSULE | Refills: 1 | Status: SHIPPED | OUTPATIENT
Start: 2025-01-14

## 2025-01-22 DIAGNOSIS — K21.9 CHRONIC GERD: ICD-10-CM

## 2025-01-23 RX ORDER — PANTOPRAZOLE SODIUM 40 MG/1
40 TABLET, DELAYED RELEASE ORAL DAILY
Qty: 90 TABLET | Refills: 1 | Status: SHIPPED | OUTPATIENT
Start: 2025-01-23

## 2025-02-12 ENCOUNTER — MYC REFILL (OUTPATIENT)
Dept: FAMILY MEDICINE | Facility: CLINIC | Age: 75
End: 2025-02-12
Payer: MEDICARE

## 2025-02-12 DIAGNOSIS — M47.26 OSTEOARTHRITIS OF SPINE WITH RADICULOPATHY, LUMBAR REGION: ICD-10-CM

## 2025-02-12 RX ORDER — HYDROCODONE BITARTRATE AND ACETAMINOPHEN 5; 325 MG/1; MG/1
1 TABLET ORAL EVERY 6 HOURS PRN
Qty: 30 TABLET | Refills: 0 | Status: SHIPPED | OUTPATIENT
Start: 2025-02-12

## 2025-02-18 ENCOUNTER — TELEPHONE (OUTPATIENT)
Dept: FAMILY MEDICINE | Facility: CLINIC | Age: 75
End: 2025-02-18
Payer: MEDICARE

## 2025-02-18 NOTE — TELEPHONE ENCOUNTER
Patient Quality Outreach    Patient is due for the following:   Depression  -  PHQ-A needed  Physical Annual Wellness Visit    Action(s) Taken:   Patient has upcoming appointment, these items will be addressed at that time.    Type of outreach:    Chart review performed, no outreach needed.    Questions for provider review:    None           Dawna Hernandes, VF

## 2025-02-24 ENCOUNTER — OFFICE VISIT (OUTPATIENT)
Dept: FAMILY MEDICINE | Facility: CLINIC | Age: 75
End: 2025-02-24
Payer: MEDICARE

## 2025-02-24 ENCOUNTER — HOSPITAL ENCOUNTER (OUTPATIENT)
Dept: GENERAL RADIOLOGY | Facility: CLINIC | Age: 75
Discharge: HOME | End: 2025-02-24
Attending: PHYSICIAN ASSISTANT | Admitting: PHYSICIAN ASSISTANT
Payer: MEDICARE

## 2025-02-24 VITALS
HEIGHT: 64 IN | OXYGEN SATURATION: 98 % | DIASTOLIC BLOOD PRESSURE: 66 MMHG | WEIGHT: 222.25 LBS | BODY MASS INDEX: 37.94 KG/M2 | RESPIRATION RATE: 16 BRPM | HEART RATE: 92 BPM | TEMPERATURE: 97 F | SYSTOLIC BLOOD PRESSURE: 126 MMHG

## 2025-02-24 DIAGNOSIS — R10.11 RUQ ABDOMINAL PAIN: ICD-10-CM

## 2025-02-24 DIAGNOSIS — F11.90 CHRONIC, CONTINUOUS USE OF OPIOIDS: ICD-10-CM

## 2025-02-24 DIAGNOSIS — F41.9 ANXIETY: ICD-10-CM

## 2025-02-24 DIAGNOSIS — R31.29 MICROSCOPIC HEMATURIA: ICD-10-CM

## 2025-02-24 DIAGNOSIS — E66.01 MORBID OBESITY (H): ICD-10-CM

## 2025-02-24 DIAGNOSIS — M47.26 OSTEOARTHRITIS OF SPINE WITH RADICULOPATHY, LUMBAR REGION: ICD-10-CM

## 2025-02-24 DIAGNOSIS — R73.03 PREDIABETES: ICD-10-CM

## 2025-02-24 DIAGNOSIS — M47.26 OSTEOARTHRITIS OF SPINE WITH RADICULOPATHY, LUMBAR REGION: Primary | ICD-10-CM

## 2025-02-24 DIAGNOSIS — I10 PRIMARY HYPERTENSION: ICD-10-CM

## 2025-02-24 DIAGNOSIS — E04.1 THYROID NODULE: ICD-10-CM

## 2025-02-24 LAB
ALBUMIN UR-MCNC: NEGATIVE MG/DL
ANION GAP SERPL CALCULATED.3IONS-SCNC: 13 MMOL/L (ref 7–15)
APPEARANCE UR: CLEAR
BILIRUB UR QL STRIP: NEGATIVE
BUN SERPL-MCNC: 15.3 MG/DL (ref 8–23)
CALCIUM SERPL-MCNC: 9.8 MG/DL (ref 8.8–10.4)
CHLORIDE SERPL-SCNC: 100 MMOL/L (ref 98–107)
COLOR UR AUTO: ABNORMAL
CREAT SERPL-MCNC: 1.11 MG/DL (ref 0.51–0.95)
CREAT UR-MCNC: 67 MG/DL
EGFRCR SERPLBLD CKD-EPI 2021: 52 ML/MIN/1.73M2
EST. AVERAGE GLUCOSE BLD GHB EST-MCNC: 134 MG/DL
GLUCOSE SERPL-MCNC: 104 MG/DL (ref 70–99)
GLUCOSE UR STRIP-MCNC: NEGATIVE MG/DL
HBA1C MFR BLD: 6.3 %
HCO3 SERPL-SCNC: 26 MMOL/L (ref 22–29)
HGB UR QL STRIP: NEGATIVE
KETONES UR STRIP-MCNC: NEGATIVE MG/DL
LEUKOCYTE ESTERASE UR QL STRIP: NEGATIVE
NITRATE UR QL: NEGATIVE
PH UR STRIP: 7.5 [PH] (ref 5–7)
POTASSIUM SERPL-SCNC: 4.2 MMOL/L (ref 3.4–5.3)
SODIUM SERPL-SCNC: 139 MMOL/L (ref 135–145)
SP GR UR STRIP: 1.01 (ref 1–1.03)
TSH SERPL DL<=0.005 MIU/L-ACNC: 3.17 UIU/ML (ref 0.3–4.2)
UROBILINOGEN UR STRIP-MCNC: NORMAL MG/DL

## 2025-02-24 PROCEDURE — 36415 COLL VENOUS BLD VENIPUNCTURE: CPT | Performed by: PHYSICIAN ASSISTANT

## 2025-02-24 PROCEDURE — 83036 HEMOGLOBIN GLYCOSYLATED A1C: CPT | Performed by: PHYSICIAN ASSISTANT

## 2025-02-24 PROCEDURE — G2211 COMPLEX E/M VISIT ADD ON: HCPCS | Performed by: PHYSICIAN ASSISTANT

## 2025-02-24 PROCEDURE — G0482 DRUG TEST DEF 15-21 CLASSES: HCPCS | Mod: 59 | Performed by: PHYSICIAN ASSISTANT

## 2025-02-24 PROCEDURE — 99214 OFFICE O/P EST MOD 30 MIN: CPT | Performed by: PHYSICIAN ASSISTANT

## 2025-02-24 PROCEDURE — 81003 URINALYSIS AUTO W/O SCOPE: CPT | Performed by: PHYSICIAN ASSISTANT

## 2025-02-24 PROCEDURE — 72080 X-RAY EXAM THORACOLMB 2/> VW: CPT

## 2025-02-24 PROCEDURE — 80048 BASIC METABOLIC PNL TOTAL CA: CPT | Performed by: PHYSICIAN ASSISTANT

## 2025-02-24 PROCEDURE — 84443 ASSAY THYROID STIM HORMONE: CPT | Performed by: PHYSICIAN ASSISTANT

## 2025-02-24 ASSESSMENT — ENCOUNTER SYMPTOMS: BACK PAIN: 1

## 2025-02-24 NOTE — LETTER

## 2025-02-24 NOTE — PROGRESS NOTES
Lucía Asher is a 74 year old, presenting for the following health issues:  Back Pain, Hypertension, and Lipids      2/24/2025    12:38 PM   Additional Questions   Roomed by Kianna KEY MA     Back Pain     History of Present Illness       Reason for visit:  6 months follow up, blood work, urine test and pain lower & middle back. Also nodules on thyroid.   She is taking medications regularly.     Concern - back pain   Onset: has been seen for back pain with you and you sent her to urology  Description: hurts more when she gets out of bed.at the bottom of the bra line   Intensity: moderate  Progression of Symptoms:  same  Accompanying Signs & Symptoms: sometimes it will radiate on the right side area  Previous history of similar problem: none  Precipitating factors:        Worsened by: worse when getting out of bed  Alleviating factors:        Improved by:   Therapies tried and outcome: salonpaws helps somewhat    Patient presents today for follow-up of blood pressure. Numbers have been well controlled. Tolerating medications well without side effects. Monitoring salt in diet. Patient denies headaches, vision changes, chest pain, shortness of breath or paresthesias.    Anxiety continues to be well controlled with current medications. Feels well balanced with this.     Due for recheck of her thyroid levels. Has not noticed any change in symptoms. Will be needing follow-up thyroid ultrasound this Fall.     Worries about her weight and blood sugars. Wonders if she would qualify for a GLP-1 to further help. She tries to eat well but exercise is often limited by her pain.     Saw urology due to having right sided flank pain and microscopic hematuria. She reports the whole visit was very odd and basically was told there is nothing wrong with her kidney's. She continues to have pain on the right mid-back area that does wrap around to her RUQ when really bad. Notices this most at night. A 1/2 tablet of Norco will  "resolve the pain fully for about 4 hours. She does note the pain was much worse around the Holiday's. She does have known osteoarthritis of her spine. We also reviewed her previous CT scan which did show a gallstone present.       Review of Systems  Constitutional, HEENT, cardiovascular, pulmonary, GI, , musculoskeletal, neuro, skin, endocrine and psych systems are negative, except as otherwise noted.      Objective    /66   Pulse 92   Temp 97  F (36.1  C) (Temporal)   Resp 16   Ht 1.619 m (5' 3.75\")   Wt 100.8 kg (222 lb 4 oz)   SpO2 98%   BMI 38.45 kg/m    Body mass index is 38.45 kg/m .  Physical Exam   GENERAL: alert and no distress  HENT: ear canals and TM's normal, nose and mouth without ulcers or lesions  NECK: no adenopathy, no asymmetry, masses, or scars  RESP: lungs clear to auscultation - no rales, rhonchi or wheezes  CV: regular rate and rhythm, normal S1 S2, no S3 or S4, no murmur, click or rub, no peripheral edema   ABDOMEN: soft, nontender, no hepatosplenomegaly, no masses and bowel sounds normal  MS: no gross musculoskeletal defects noted, no edema  SKIN: no suspicious lesions or rashes  PSYCH: mentation appears normal, affect normal/bright        Assessment & Plan     Osteoarthritis of spine with radiculopathy, lumbar region  I do question if her right mid-back pain is actually more related to her gallbladder. Recommended both xray evaluation and ultrasound to help determine root cause. She does use Norco at bedtime and that has been helpful. CSA and UDS updated today. Further follow-up pending results.   - Drug Confirmation Panel Urine with Creat - lab collect; Future  - XR Thoracic Lumbar Standing 2 Views; Future  - Drug Confirmation Panel Urine with Creat - lab collect    RUQ abdominal pain  See above. Ultrasound ordered today.  - US Abdomen Limited; Future    Primary hypertension  Stable. Continue current treatment without change.   - Basic metabolic panel  (Ca, Cl, CO2, Creat, " Gluc, K, Na, BUN); Future  - Basic metabolic panel  (Ca, Cl, CO2, Creat, Gluc, K, Na, BUN)    Anxiety  Stable. Continue current treatment without change.     Morbid obesity (H)  Weight addressed and labs updated today. Pending further increase in A1c consider starting Wegovy with insurance approval.  Appropriate use, side effects and dose titration if needed discussed. Encouraged ongoing diet and exercise modifications as able.   - semaglutide-weight management (WEGOVY) 0.25 MG/0.5ML pen; Inject 0.5 mLs (0.25 mg) subcutaneously once a week.    Prediabetes  See above  - Hemoglobin A1c; Future  - Hemoglobin A1c  - semaglutide-weight management (WEGOVY) 0.25 MG/0.5ML pen; Inject 0.5 mLs (0.25 mg) subcutaneously once a week.    Thyroid nodule  Update of labs today. Due for ultrasound this fall.   - TSH with free T4 reflex; Future  - TSH with free T4 reflex    Microscopic hematuria  Updated labs today.   - UA Macroscopic with reflex to Microscopic and Culture - Lab Collect; Future  - UA Macroscopic with reflex to Microscopic and Culture - Lab Collect    Chronic, continuous use of opioids  CSA and UDS updated today.     The longitudinal plan of care for the diagnosis(es)/condition(s) as documented were addressed during this visit. Due to the added complexity in care, I will continue to support [place]ia in the subsequent management and with ongoing continuity of care.    The patient indicates understanding of these issues and agrees with the plan.    Signed Electronically by: Gregoria Kumari PA-C

## 2025-02-25 ENCOUNTER — TELEPHONE (OUTPATIENT)
Dept: FAMILY MEDICINE | Facility: CLINIC | Age: 75
End: 2025-02-25
Payer: MEDICARE

## 2025-02-25 PROBLEM — F11.90 CHRONIC, CONTINUOUS USE OF OPIOIDS: Status: ACTIVE | Noted: 2025-02-25

## 2025-02-25 PROBLEM — F41.9 ANXIETY: Status: ACTIVE | Noted: 2025-02-25

## 2025-02-25 PROBLEM — F11.20 CONTINUOUS OPIOID DEPENDENCE (H): Status: ACTIVE | Noted: 2025-02-25

## 2025-02-25 NOTE — TELEPHONE ENCOUNTER
Prior Authorization Retail Medication Request    Medication/Dose: Wegovy 0.25mg/0.5ml  Diagnosis and ICD code (if different than what is on RX):    New/renewal/insurance change PA/secondary ins. PA:  Previously Tried and Failed:    Rationale:      Insurance   Primary: [employer]   Insurance ID:  [id number]    Pharmacy Information (if different than what is on RX)  Name:  [facility]  Phone:  [phone]  Fax:[phone]    Thank you,   Marci Rocha, Pharmacy [facility]           Clinic Information  Preferred routing pool for dept communication:

## 2025-02-26 ENCOUNTER — HOSPITAL ENCOUNTER (OUTPATIENT)
Dept: ULTRASOUND IMAGING | Facility: CLINIC | Age: 75
Discharge: HOME | End: 2025-02-26
Attending: PHYSICIAN ASSISTANT
Payer: MEDICARE

## 2025-02-26 DIAGNOSIS — R10.11 RUQ ABDOMINAL PAIN: ICD-10-CM

## 2025-02-26 LAB
DHC UR CFM-MCNC: 119 NG/ML
DHC/CREAT UR: 178 NG/MG {CREAT}

## 2025-02-26 PROCEDURE — 76705 ECHO EXAM OF ABDOMEN: CPT

## 2025-02-27 NOTE — TELEPHONE ENCOUNTER
Retail Pharmacy Prior Authorization Team   Phone: [phone]    Prior Authorization Approval    Medication: WEGOVY 0.25 MG/0.5ML SC SOAJ  Authorization Effective Date: 1/28/2025  Authorization Expiration Date: 2/27/2026  Reference #: [id number]  Insurance Company: [employer] Non-Specialty PA's - Phone [phone] Fax [phone]  Which Pharmacy is filling the prescription: [facility] [address]   Pharmacy Notified: YES  Patient Notified: **Instructed pharmacy to notify patient when script is ready to /ship.**

## 2025-03-04 ENCOUNTER — TELEPHONE (OUTPATIENT)
Dept: SURGERY | Facility: CLINIC | Age: 75
End: 2025-03-04

## 2025-03-04 ENCOUNTER — OFFICE VISIT (OUTPATIENT)
Dept: SURGERY | Facility: CLINIC | Age: 75
End: 2025-03-04
Attending: PHYSICIAN ASSISTANT
Payer: MEDICARE

## 2025-03-04 VITALS
DIASTOLIC BLOOD PRESSURE: 78 MMHG | WEIGHT: 222 LBS | BODY MASS INDEX: 37.9 KG/M2 | TEMPERATURE: 96.6 F | SYSTOLIC BLOOD PRESSURE: 128 MMHG | HEIGHT: 64 IN

## 2025-03-04 DIAGNOSIS — K81.1 CHRONIC CHOLECYSTITIS: Primary | ICD-10-CM

## 2025-03-04 DIAGNOSIS — K80.20 SYMPTOMATIC CHOLELITHIASIS: ICD-10-CM

## 2025-03-04 LAB
ALBUMIN SERPL BCG-MCNC: 4.4 G/DL (ref 3.5–5.2)
ALP SERPL-CCNC: 48 U/L (ref 40–150)
ALT SERPL W P-5'-P-CCNC: 28 U/L (ref 0–50)
AST SERPL W P-5'-P-CCNC: 24 U/L (ref 0–45)
BILIRUB DIRECT SERPL-MCNC: 0.13 MG/DL (ref 0–0.3)
BILIRUB SERPL-MCNC: 0.4 MG/DL
ERYTHROCYTE [DISTWIDTH] IN BLOOD BY AUTOMATED COUNT: 14 % (ref 10–15)
HCT VFR BLD AUTO: 42.2 % (ref 35–47)
HGB BLD-MCNC: 13.7 G/DL (ref 11.7–15.7)
MCH RBC QN AUTO: 28.3 PG (ref 26.5–33)
MCHC RBC AUTO-ENTMCNC: 32.5 G/DL (ref 31.5–36.5)
MCV RBC AUTO: 87 FL (ref 78–100)
PLATELET # BLD AUTO: 282 10E3/UL (ref 150–450)
PROT SERPL-MCNC: 7.3 G/DL (ref 6.4–8.3)
RBC # BLD AUTO: 4.84 10E6/UL (ref 3.8–5.2)
WBC # BLD AUTO: 8.9 10E3/UL (ref 4–11)

## 2025-03-04 PROCEDURE — 3074F SYST BP LT 130 MM HG: CPT | Performed by: SPECIALIST

## 2025-03-04 PROCEDURE — 3078F DIAST BP <80 MM HG: CPT | Performed by: SPECIALIST

## 2025-03-04 PROCEDURE — 80076 HEPATIC FUNCTION PANEL: CPT | Performed by: SPECIALIST

## 2025-03-04 PROCEDURE — 85027 COMPLETE CBC AUTOMATED: CPT | Performed by: SPECIALIST

## 2025-03-04 PROCEDURE — 36415 COLL VENOUS BLD VENIPUNCTURE: CPT | Performed by: SPECIALIST

## 2025-03-04 PROCEDURE — 99204 OFFICE O/P NEW MOD 45 MIN: CPT | Performed by: SPECIALIST

## 2025-03-04 PROCEDURE — 1125F AMNT PAIN NOTED PAIN PRSNT: CPT | Performed by: SPECIALIST

## 2025-03-04 ASSESSMENT — PAIN SCALES - GENERAL: PAINLEVEL_OUTOF10: SEVERE PAIN (8)

## 2025-03-04 NOTE — LETTER
3/4/2025      Shelly Carcamo  [address] [facility] [address]      Dear Colleague,    Thank you for referring your patient, Shelly Carcamo, to the [facility]. Please see a copy of my visit note below.    Consult requested by Gregoria Kumari    Reason for consultation: Gallstones    HPI:  Patient is a 74-year lady presenting with a many year history of right back pain.  While eating high-fat meals over the holidays the pain became worse and radiated to the right upper quadrant.  There is associated nausea with high-fat meals denies any vomiting fevers or chills.  She states now the pain has become more frequent.  She saw her PCP obtain an ultrasound and diagnosed with gallstones for which she is now referred.    Past Medical History:   Diagnosis Date     Cataract      Chronic osteoarthritis      Gastroesophageal reflux disease      Hyperlipidemia      Hypertension      Past Surgical History:   Procedure Laterality Date     BIOPSY  2018    Hysterectomy     CATARACT IOL, RT/LT Bilateral 2015     COLONOSCOPY  2016    Biopsy negitive     ENT SURGERY  1956     HYSTERECTOMY       HYSTERECTOMY, PAP NO LONGER INDICATED       ORTHOPEDIC SURGERY  2016    both wrist Carpal tunnel surgery     Current Outpatient Medications   Medication Sig Dispense Refill     atorvastatin (LIPITOR) 20 MG tablet TAKE 1 TABLET DAILY 90 tablet 1     carvedilol (COREG) 6.25 MG tablet Take 1 tablet (6.25 mg) by mouth 2 times daily (with meals) 180 tablet 3     Coenzyme Q10 (COQ10 PO) 1 tablet daily       Cyanocobalamin (B-12 PO) Take 1 tablet by mouth daily       famotidine (PEPCID) 20 MG tablet TAKE 1 TABLET TWICE A  tablet 3     HYDROcodone-acetaminophen (NORCO) 5-325 MG tablet Take 1 tablet by mouth every 6 hours as needed for severe pain. 30 tablet 0     losartan (COZAAR) 50 MG tablet TAKE 2 TABLETS ONCE DAILY 180 tablet 3     LYSINE PO Take 1 tablet by mouth 2 times daily       pantoprazole (PROTONIX) 40 MG EC  tablet TAKE 1 TABLET DAILY 90 tablet 1     semaglutide-weight management (WEGOVY) 0.25 MG/0.5ML pen Inject 0.5 mLs (0.25 mg) subcutaneously once a week. 2 mL 0     TURMERIC PO Take 1 capsule by mouth daily       venlafaxine (EFFEXOR XR) 75 MG 24 hr capsule Take 1 capsule (75 mg) by mouth daily. 90 capsule 1     Vitamin D, Cholecalciferol, 25 MCG (1000 UT) CAPS 1 capsule daily       No current facility-administered medications for this visit.      No Known Allergies    Social History     Socioeconomic History     Marital status:      Spouse name: Not on file     Number of children: Not on file     Years of education: Not on file     Highest education level: Not on file   Occupational History     Not on file   Tobacco Use     Smoking status: Never     Smokeless tobacco: Never   Vaping Use     Vaping status: Never Used   Substance and Sexual Activity     Alcohol use: Yes     Comment: socially on occasion     Drug use: Never     Sexual activity: Not Currently     Partners: Male     Birth control/protection: Female Surgical   Other Topics Concern     Parent/sibling w/ CABG, MI or angioplasty before 65F 55M? Yes     Comment: mother & brother   Social History Narrative     Not on file     Social Drivers of Health     Financial Resource Strain: Low Risk  (8/21/2024)    Financial Resource Strain      Within the past 12 months, have you or your family members you live with been unable to get utilities (heat, electricity) when it was really needed?: No   Food Insecurity: Low Risk  (8/21/2024)    Food Insecurity      Within the past 12 months, did you worry that your food would run out before you got money to buy more?: No      Within the past 12 months, did the food you bought just not last and you didn t have money to get more?: No   Transportation Needs: Low Risk  (8/21/2024)    Transportation Needs      Within the past 12 months, has lack of transportation kept you from medical appointments, getting your medicines,  non-medical meetings or appointments, work, or from getting things that you need?: No   Physical Activity: Insufficiently Active (8/21/2024)    Exercise Vital Sign      Days of Exercise per Week: 3 days      Minutes of Exercise per Session: 20 min   Stress: Stress Concern Present (8/21/2024)    [employer] - Occupational Stress Questionnaire      Feeling of Stress : To some extent   Social Connections: Unknown (8/21/2024)    Social Connection and Isolation Panel [NHANES]      Frequency of Communication with Friends and Family: Not on file      Frequency of Social Gatherings with Friends and Family: Once a week      Attends [employer] Services: Not on file      Active Member of Clubs or Organizations: Not on file      Attends Club or Organization Meetings: Not on file      Marital Status: Not on file   Interpersonal Safety: Low Risk  (2/24/2025)    Interpersonal Safety      Do you feel physically and emotionally safe where you currently live?: Yes      Within the past 12 months, have you been hit, slapped, kicked or otherwise physically hurt by someone?: No      Within the past 12 months, have you been humiliated or emotionally abused in other ways by your partner or ex-partner?: No   Housing Stability: Low Risk  (8/21/2024)    Housing Stability      Do you have housing? : Yes      Are you worried about losing your housing?: No     Family History   Problem Relation Age of Onset     Pancreatic Cancer Mother      Hypertension Mother      Obesity Mother      Diabetes Mother      ALS Father      Coronary Artery Disease Father         Valve replacement. Aortic valve calcification     Hyperlipidemia Father      Obesity Father      Obesity Maternal Grandmother      Glaucoma Maternal Grandfather      Atrial fibrillation Brother      Glaucoma Other      Coronary Artery Disease Brother         AFIB     Hypertension Brother      Hyperlipidemia Brother      Obesity Brother      Colon Cancer Cousin   "    Macular Degeneration No family hx of         ROS: 10 point ROS neg other than the symptoms noted above in the HPI.    PE:  B/P: 128/78, T: 96.6, P: Data Unavailable, R: Data Unavailable  General: well developed, well nourished WF who appears their stated age  HEENT: NC/AT, EOMI, (-)icterus, (-)injection  Neck: Supple, No JVD  Chest: CTA  Heart: S1, S2, (-)m/r/g  Abd: Soft, moderately quadrant tenderness to deep palpation.  non distended, no masses  Ext; Warm, no edema  Psych: AAOx3  Neuro: No focal deficits    Lab Results   Component Value Date    WBC 8.9 03/04/2025     Lab Results   Component Value Date    RBC 4.84 03/04/2025     Lab Results   Component Value Date    HGB 13.7 03/04/2025     Lab Results   Component Value Date    HCT 42.2 03/04/2025     No components found for: \"MCT\"  Lab Results   Component Value Date    MCV 87 03/04/2025     Lab Results   Component Value Date    MCH 28.3 03/04/2025     Lab Results   Component Value Date    MCHC 32.5 03/04/2025     Lab Results   Component Value Date    RDW 14.0 03/04/2025     Lab Results   Component Value Date     03/04/2025     LFTs pending      EXAM: US ABDOMEN LIMITED  LOCATION: [facility]  DATE: 2/26/2025     INDICATION: RUQ abdominal pain.  COMPARISON: CT 4/8/2024.  TECHNIQUE: Limited abdominal ultrasound.     FINDINGS:     GALLBLADDER: Cholelithiasis identified. Negative sonographic Bergeron's sign. Gallbladder wall is 3 mm.     BILE DUCTS: No biliary dilatation. The common duct measures 4 mm.     LIVER: Increased echogenicity from diffuse fatty infiltration. No focal lesion. Liver is 17.4 cm. The portal vein is patent with flow in the normal direction.     RIGHT KIDNEY: No hydronephrosis.     PANCREAS: The visualized portions are normal.     No ascites.                                                                      IMPRESSION:  1.  Cholelithiasis.  2.  Fatty liver. Hepatomegaly.        Impression/plan:  This is a " 74-year-old lady with biliary colic that is probably progressed to chronic cholecystitis.  I  discussed these findings with the patient and she expressed understanding.  I also discussed the role of cholecystectomy.  After discussion with the patient the plan at this time is to proceed to a laparoscopic cholecystectomy   The procedure, risks, benefits, and alternatives were discussed and the patient agrees to proceed.  She will be scheduled the near future.    Carter Salmeron MD, FACS      Again, thank you for allowing me to participate in the care of your patient.        Sincerely,        Carter Salmeron MD    Electronically signed

## 2025-03-04 NOTE — PROGRESS NOTES
Consult requested by Gregoria Kumari    Reason for consultation: Gallstones    HPI:  Patient is a 74-year lady presenting with a many year history of right back pain.  While eating high-fat meals over the holidays the pain became worse and radiated to the right upper quadrant.  There is associated nausea with high-fat meals denies any vomiting fevers or chills.  She states now the pain has become more frequent.  She saw her PCP obtain an ultrasound and diagnosed with gallstones for which she is now referred.    Past Medical History:   Diagnosis Date    Cataract     Chronic osteoarthritis     Gastroesophageal reflux disease     Hyperlipidemia     Hypertension      Past Surgical History:   Procedure Laterality Date    BIOPSY  2018    Hysterectomy    CATARACT IOL, RT/LT Bilateral 2015    COLONOSCOPY  2016    Biopsy negitive    ENT SURGERY  1956    HYSTERECTOMY      HYSTERECTOMY, PAP NO LONGER INDICATED      ORTHOPEDIC SURGERY  2016    both wrist Carpal tunnel surgery     Current Outpatient Medications   Medication Sig Dispense Refill    atorvastatin (LIPITOR) 20 MG tablet TAKE 1 TABLET DAILY 90 tablet 1    carvedilol (COREG) 6.25 MG tablet Take 1 tablet (6.25 mg) by mouth 2 times daily (with meals) 180 tablet 3    Coenzyme Q10 (COQ10 PO) 1 tablet daily      Cyanocobalamin (B-12 PO) Take 1 tablet by mouth daily      famotidine (PEPCID) 20 MG tablet TAKE 1 TABLET TWICE A  tablet 3    HYDROcodone-acetaminophen (NORCO) 5-325 MG tablet Take 1 tablet by mouth every 6 hours as needed for severe pain. 30 tablet 0    losartan (COZAAR) 50 MG tablet TAKE 2 TABLETS ONCE DAILY 180 tablet 3    LYSINE PO Take 1 tablet by mouth 2 times daily      pantoprazole (PROTONIX) 40 MG EC tablet TAKE 1 TABLET DAILY 90 tablet 1    semaglutide-weight management (WEGOVY) 0.25 MG/0.5ML pen Inject 0.5 mLs (0.25 mg) subcutaneously once a week. 2 mL 0    TURMERIC PO Take 1 capsule by mouth daily      venlafaxine (EFFEXOR XR) 75 MG 24 hr capsule  Take 1 capsule (75 mg) by mouth daily. 90 capsule 1    Vitamin D, Cholecalciferol, 25 MCG (1000 UT) CAPS 1 capsule daily       No current facility-administered medications for this visit.      No Known Allergies    Social History     Socioeconomic History    Marital status:      Spouse name: Not on file    Number of children: Not on file    Years of education: Not on file    Highest education level: Not on file   Occupational History    Not on file   Tobacco Use    Smoking status: Never    Smokeless tobacco: Never   Vaping Use    Vaping status: Never Used   Substance and Sexual Activity    Alcohol use: Yes     Comment: socially on occasion    Drug use: Never    Sexual activity: Not Currently     Partners: Male     Birth control/protection: Female Surgical   Other Topics Concern    Parent/sibling w/ CABG, MI or angioplasty before 65F 55M? Yes     Comment: mother & brother   Social History Narrative    Not on file     Social Drivers of Health     Financial Resource Strain: Low Risk  (8/21/2024)    Financial Resource Strain     Within the past 12 months, have you or your family members you live with been unable to get utilities (heat, electricity) when it was really needed?: No   Food Insecurity: Low Risk  (8/21/2024)    Food Insecurity     Within the past 12 months, did you worry that your food would run out before you got money to buy more?: No     Within the past 12 months, did the food you bought just not last and you didn t have money to get more?: No   Transportation Needs: Low Risk  (8/21/2024)    Transportation Needs     Within the past 12 months, has lack of transportation kept you from medical appointments, getting your medicines, non-medical meetings or appointments, work, or from getting things that you need?: No   Physical Activity: Insufficiently Active (8/21/2024)    Exercise Vital Sign     Days of Exercise per Week: 3 days     Minutes of Exercise per Session: 20 min   Stress: Stress Concern  Present (8/21/2024)    [employer] - Occupational Stress Questionnaire     Feeling of Stress : To some extent   Social Connections: Unknown (8/21/2024)    Social Connection and Isolation Panel [NHANES]     Frequency of Communication with Friends and Family: Not on file     Frequency of Social Gatherings with Friends and Family: Once a week     Attends [employer] Services: Not on file     Active Member of Clubs or Organizations: Not on file     Attends Club or Organization Meetings: Not on file     Marital Status: Not on file   Interpersonal Safety: Low Risk  (2/24/2025)    Interpersonal Safety     Do you feel physically and emotionally safe where you currently live?: Yes     Within the past 12 months, have you been hit, slapped, kicked or otherwise physically hurt by someone?: No     Within the past 12 months, have you been humiliated or emotionally abused in other ways by your partner or ex-partner?: No   Housing Stability: Low Risk  (8/21/2024)    Housing Stability     Do you have housing? : Yes     Are you worried about losing your housing?: No     Family History   Problem Relation Age of Onset    Pancreatic Cancer Mother     Hypertension Mother     Obesity Mother     Diabetes Mother     ALS Father     Coronary Artery Disease Father         Valve replacement. Aortic valve calcification    Hyperlipidemia Father     Obesity Father     Obesity Maternal Grandmother     Glaucoma Maternal Grandfather     Atrial fibrillation Brother     Glaucoma Other     Coronary Artery Disease Brother         AFIB    Hypertension Brother     Hyperlipidemia Brother     Obesity Brother     Colon Cancer Cousin     Macular Degeneration No family hx of         ROS: 10 point ROS neg other than the symptoms noted above in the HPI.    PE:  B/P: 128/78, T: 96.6, P: Data Unavailable, R: Data Unavailable  General: well developed, well nourished WF who appears their stated age  HEENT: NC/AT, EOMI, (-)icterus,  "(-)injection  Neck: Supple, No JVD  Chest: CTA  Heart: S1, S2, (-)m/r/g  Abd: Soft, moderately quadrant tenderness to deep palpation.  non distended, no masses  Ext; Warm, no edema  Psych: AAOx3  Neuro: No focal deficits    Lab Results   Component Value Date    WBC 8.9 03/04/2025     Lab Results   Component Value Date    RBC 4.84 03/04/2025     Lab Results   Component Value Date    HGB 13.7 03/04/2025     Lab Results   Component Value Date    HCT 42.2 03/04/2025     No components found for: \"MCT\"  Lab Results   Component Value Date    MCV 87 03/04/2025     Lab Results   Component Value Date    MCH 28.3 03/04/2025     Lab Results   Component Value Date    MCHC 32.5 03/04/2025     Lab Results   Component Value Date    RDW 14.0 03/04/2025     Lab Results   Component Value Date     03/04/2025     LFTs pending      EXAM: US ABDOMEN LIMITED  LOCATION: [facility]  DATE: 2/26/2025     INDICATION: RUQ abdominal pain.  COMPARISON: CT 4/8/2024.  TECHNIQUE: Limited abdominal ultrasound.     FINDINGS:     GALLBLADDER: Cholelithiasis identified. Negative sonographic Bergeron's sign. Gallbladder wall is 3 mm.     BILE DUCTS: No biliary dilatation. The common duct measures 4 mm.     LIVER: Increased echogenicity from diffuse fatty infiltration. No focal lesion. Liver is 17.4 cm. The portal vein is patent with flow in the normal direction.     RIGHT KIDNEY: No hydronephrosis.     PANCREAS: The visualized portions are normal.     No ascites.                                                                      IMPRESSION:  1.  Cholelithiasis.  2.  Fatty liver. Hepatomegaly.        Impression/plan:  This is a 74-year-old lady with biliary colic that is probably progressed to chronic cholecystitis.  I  discussed these findings with the patient and she expressed understanding.  I also discussed the role of cholecystectomy.  After discussion with the patient the plan at this time is to proceed to a " laparoscopic cholecystectomy   The procedure, risks, benefits, and alternatives were discussed and the patient agrees to proceed.  She will be scheduled the near future.    Carter Salmeron MD, FACS

## 2025-03-06 NOTE — TELEPHONE ENCOUNTER
Type of surgery: CHOLECYSTECTOMY, LAPAROSCOPIC   Location of surgery: [facility]  Date and time of surgery: 3/28  Surgeon: Jaron  Pre-Op Appt Date: 3/25  Post-Op Appt Date: 4/8   Packet sent out: Yes  Pre-cert/Authorization completed:  Not Applicable  Date: na  Pt informed to stop GLP-1 10 days prior

## 2025-03-09 ENCOUNTER — HEALTH MAINTENANCE LETTER (OUTPATIENT)
Age: 75
End: 2025-03-09

## 2025-03-17 ENCOUNTER — MYC REFILL (OUTPATIENT)
Dept: FAMILY MEDICINE | Facility: CLINIC | Age: 75
End: 2025-03-17
Payer: MEDICARE

## 2025-03-17 DIAGNOSIS — R73.03 PREDIABETES: ICD-10-CM

## 2025-03-17 DIAGNOSIS — M47.26 OSTEOARTHRITIS OF SPINE WITH RADICULOPATHY, LUMBAR REGION: ICD-10-CM

## 2025-03-17 DIAGNOSIS — E66.01 MORBID OBESITY (H): ICD-10-CM

## 2025-03-17 RX ORDER — HYDROCODONE BITARTRATE AND ACETAMINOPHEN 5; 325 MG/1; MG/1
1 TABLET ORAL EVERY 6 HOURS PRN
Qty: 30 TABLET | Refills: 0 | Status: SHIPPED | OUTPATIENT
Start: 2025-03-17

## 2025-03-17 RX ORDER — CALCIUM CARBONATE 500 MG/1
1 TABLET, CHEWABLE ORAL 2 TIMES DAILY
COMMUNITY

## 2025-03-24 ASSESSMENT — PATIENT HEALTH QUESTIONNAIRE - PHQ9
SUM OF ALL RESPONSES TO PHQ QUESTIONS 1-9: 0
SUM OF ALL RESPONSES TO PHQ QUESTIONS 1-9: 0

## 2025-03-25 ENCOUNTER — OFFICE VISIT (OUTPATIENT)
Dept: FAMILY MEDICINE | Facility: CLINIC | Age: 75
End: 2025-03-25
Payer: MEDICARE

## 2025-03-25 VITALS
BODY MASS INDEX: 35.82 KG/M2 | HEART RATE: 94 BPM | DIASTOLIC BLOOD PRESSURE: 68 MMHG | OXYGEN SATURATION: 98 % | RESPIRATION RATE: 12 BRPM | HEIGHT: 65 IN | TEMPERATURE: 97.3 F | SYSTOLIC BLOOD PRESSURE: 110 MMHG | WEIGHT: 215 LBS

## 2025-03-25 DIAGNOSIS — M47.26 OSTEOARTHRITIS OF SPINE WITH RADICULOPATHY, LUMBAR REGION: ICD-10-CM

## 2025-03-25 DIAGNOSIS — F41.9 ANXIETY: ICD-10-CM

## 2025-03-25 DIAGNOSIS — F11.90 CHRONIC, CONTINUOUS USE OF OPIOIDS: ICD-10-CM

## 2025-03-25 DIAGNOSIS — E66.01 MORBID OBESITY (H): ICD-10-CM

## 2025-03-25 DIAGNOSIS — Z01.818 PREOPERATIVE EXAMINATION: Primary | ICD-10-CM

## 2025-03-25 DIAGNOSIS — K80.20 SYMPTOMATIC CHOLELITHIASIS: ICD-10-CM

## 2025-03-25 DIAGNOSIS — I10 PRIMARY HYPERTENSION: ICD-10-CM

## 2025-03-25 PROCEDURE — G2211 COMPLEX E/M VISIT ADD ON: HCPCS | Performed by: NURSE PRACTITIONER

## 2025-03-25 PROCEDURE — 3078F DIAST BP <80 MM HG: CPT | Performed by: NURSE PRACTITIONER

## 2025-03-25 PROCEDURE — 3074F SYST BP LT 130 MM HG: CPT | Performed by: NURSE PRACTITIONER

## 2025-03-25 PROCEDURE — 99214 OFFICE O/P EST MOD 30 MIN: CPT | Performed by: NURSE PRACTITIONER

## 2025-03-25 PROCEDURE — 96127 BRIEF EMOTIONAL/BEHAV ASSMT: CPT | Performed by: NURSE PRACTITIONER

## 2025-03-25 ASSESSMENT — ANXIETY QUESTIONNAIRES
4. TROUBLE RELAXING: NOT AT ALL
2. NOT BEING ABLE TO STOP OR CONTROL WORRYING: NOT AT ALL
GAD7 TOTAL SCORE: 0
6. BECOMING EASILY ANNOYED OR IRRITABLE: NOT AT ALL
8. IF YOU CHECKED OFF ANY PROBLEMS, HOW DIFFICULT HAVE THESE MADE IT FOR YOU TO DO YOUR WORK, TAKE CARE OF THINGS AT HOME, OR GET ALONG WITH OTHER PEOPLE?: NOT DIFFICULT AT ALL
3. WORRYING TOO MUCH ABOUT DIFFERENT THINGS: NOT AT ALL
IF YOU CHECKED OFF ANY PROBLEMS ON THIS QUESTIONNAIRE, HOW DIFFICULT HAVE THESE PROBLEMS MADE IT FOR YOU TO DO YOUR WORK, TAKE CARE OF THINGS AT HOME, OR GET ALONG WITH OTHER PEOPLE: NOT DIFFICULT AT ALL
GAD7 TOTAL SCORE: 3
3. WORRYING TOO MUCH ABOUT DIFFERENT THINGS: SEVERAL DAYS
GAD7 TOTAL SCORE: 0
IF YOU CHECKED OFF ANY PROBLEMS ON THIS QUESTIONNAIRE, HOW DIFFICULT HAVE THESE PROBLEMS MADE IT FOR YOU TO DO YOUR WORK, TAKE CARE OF THINGS AT HOME, OR GET ALONG WITH OTHER PEOPLE: SOMEWHAT DIFFICULT
1. FEELING NERVOUS, ANXIOUS, OR ON EDGE: NOT AT ALL
5. BEING SO RESTLESS THAT IT IS HARD TO SIT STILL: NOT AT ALL
7. FEELING AFRAID AS IF SOMETHING AWFUL MIGHT HAPPEN: NOT AT ALL
6. BECOMING EASILY ANNOYED OR IRRITABLE: NOT AT ALL
4. TROUBLE RELAXING: NOT AT ALL
7. FEELING AFRAID AS IF SOMETHING AWFUL MIGHT HAPPEN: NOT AT ALL
2. NOT BEING ABLE TO STOP OR CONTROL WORRYING: SEVERAL DAYS
GAD7 TOTAL SCORE: 0
7. FEELING AFRAID AS IF SOMETHING AWFUL MIGHT HAPPEN: NOT AT ALL
1. FEELING NERVOUS, ANXIOUS, OR ON EDGE: SEVERAL DAYS
5. BEING SO RESTLESS THAT IT IS HARD TO SIT STILL: NOT AT ALL

## 2025-03-25 NOTE — PROGRESS NOTES
Preoperative Evaluation  [address] [address]  Phone: [phone]  Fax: [phone]  Primary Provider: Gregoria Kumari PA-C  Pre-op Performing Provider: Janey Romero NP  Mar 25, 2025             3/20/2025   Surgical Information   What procedure is being done? TAKING GALL BLADDER  OUT DUE TO STONES   Facility or Hospital where procedure/surgery will be performed: [facility]   Who is doing the procedure / surgery? Carter Salmeron   Date of surgery / procedure: 3/28/2025   Time of surgery / procedure: 9:50am   Where do you plan to recover after surgery? at home with family     Fax number for surgical facility: Note does not need to be faxed, will be available electronically in Epic.    Assessment & Plan     The proposed surgical procedure is considered INTERMEDIATE risk.    Preoperative examination    Symptomatic cholelithiasis    Primary hypertension  Stable on current medications, reviewed medication use on day of surgery    Anxiety  Stable on Venlafaxine    Osteoarthritis of spine with radiculopathy, lumbar region  Chronic, continuous use of opioids  Using Hydrocodone as needed. PDMP reviewed.     Morbid obesity (H)  Started on Wegovy, she did not take her dose last week in preparation for surgery      Possible Sleep Apnea: Defer to PCP           - No identified additional risk factors other than previously addressed     Recommendation  Approval given to proceed with proposed procedure, without further diagnostic evaluation.    The longitudinal plan of care for the diagnosis(es)/condition(s) as documented were addressed during this visit. Due to the added complexity in care, I will continue to support Lakeshia in the subsequent management and with ongoing continuity of care.    Lucía Asher is a 74 year old, presenting for the following:  Pre-Op Exam (CHOLECYSTECTOMY, LAPAROSCOPIC . DOS 03/28/2025. Dr. Salmeron)          3/25/2025      2:48 PM   Additional Questions   Roomed by Christian WELCH: Lakeshia has been having right back pain and right flank pain for the 1-2 months. She was seen by Urology for concerns of kidney stones however it was not felt that was causing her symptoms. She was found to have a gallstone and referred to general surgery.     Lakeshia has a history of hypertension, currently managed with losartan and carvedilol. She is taking Venlafaxine for anxiety symptoms. She also has a history of chronic back pain, she is taking Hydrocodone-Acetaminophen as needed for this.   She has recently started Wegovy for her obesity with improvement in her appetite.         3/20/2025   Pre-Op Questionnaire   Have you ever had a heart attack or stroke? No   Have you ever had surgery on your heart or blood vessels, such as a stent placement, a coronary artery bypass, or surgery on an artery in your head, neck, heart, or legs? No   Do you have chest pain with activity? No   Do you have a history of heart failure? No   Do you currently have a cold, bronchitis or symptoms of other infection? No   Do you have a cough, shortness of breath, or wheezing? No   Do you or anyone in your family have previous history of blood clots? No   Do you or does anyone in your family have a serious bleeding problem such as prolonged bleeding following surgeries or cuts? (!) UNKNOWN    Have you ever had problems with anemia or been told to take iron pills? No   Have you had any abnormal blood loss such as black, tarry or bloody stools, or abnormal vaginal bleeding? No   Have you ever had a blood transfusion? No   Are you willing to have a blood transfusion if it is medically needed before, during, or after your surgery? Yes   Have you or any of your relatives ever had problems with anesthesia? No   Do you have sleep apnea, excessive snoring or daytime drowsiness? (!) YES   Do you have a CPAP machine? (!) NO    Do you have any artifical heart valves or other implanted  medical devices like a pacemaker, defibrillator, or continuous glucose monitor? No   Do you have artificial joints? No   Are you allergic to latex? No     Health Care Directive  Patient does not have a Health Care Directive: Discussed advance care planning with patient; however, patient declined at this time.    Preoperative Review of    reviewed - controlled substances reflected in medication list.          Patient Active Problem List    Diagnosis Date Noted    Chronic, continuous use of opioids 02/25/2025     Priority: Medium    Anxiety 02/25/2025     Priority: Medium    Gastroesophageal reflux disease with esophagitis 02/10/2020     Priority: Medium    Controlled substance agreement signed 02/10/2020     Priority: Medium    Elevated hemoglobin A1c 02/10/2020     Priority: Medium    Obesity (BMI 35.0-39.9) with comorbidity (H) 01/29/2020     Priority: Medium    Vitamin D deficiency 01/29/2020     Priority: Medium    DDD (degenerative disc disease), lumbar 01/29/2020     Priority: Medium    Osteoarthritis of spine with radiculopathy, lumbar region 01/29/2020     Priority: Medium    Psychophysiological insomnia 01/29/2020     Priority: Medium    High cholesterol 12/08/2015     Priority: Medium    HTN (hypertension) 12/08/2015     Priority: Medium    Vitamin B12 deficiency 12/08/2015     Priority: Medium      Past Medical History:   Diagnosis Date    Cataract     Chronic osteoarthritis     Gastroesophageal reflux disease     Hyperlipidemia     Hypertension      Past Surgical History:   Procedure Laterality Date    BIOPSY  2018    Hysterectomy    CATARACT IOL, RT/LT Bilateral 2015    COLONOSCOPY  2016    Biopsy negitive    ENT SURGERY  1956    HYSTERECTOMY      HYSTERECTOMY, PAP NO LONGER INDICATED      ORTHOPEDIC SURGERY  2016    both wrist Carpal tunnel surgery     Current Outpatient Medications   Medication Sig Dispense Refill    atorvastatin (LIPITOR) 20 MG tablet TAKE 1 TABLET DAILY 90 tablet 1    calcium  "carbonate (TUMS) 500 MG chewable tablet Take 1 chew tab by mouth 2 times daily.      carvedilol (COREG) 6.25 MG tablet Take 1 tablet (6.25 mg) by mouth 2 times daily (with meals) 180 tablet 3    Cyanocobalamin (B-12 PO) Take 1 tablet by mouth daily      famotidine (PEPCID) 20 MG tablet TAKE 1 TABLET TWICE A  tablet 3    HYDROcodone-acetaminophen (NORCO) 5-325 MG tablet Take 1 tablet by mouth every 6 hours as needed for severe pain. 30 tablet 0    losartan (COZAAR) 50 MG tablet TAKE 2 TABLETS ONCE DAILY 180 tablet 3    LYSINE PO Take 1 tablet by mouth 2 times daily      venlafaxine (EFFEXOR XR) 75 MG 24 hr capsule Take 1 capsule (75 mg) by mouth daily. 90 capsule 1    Vitamin D, Cholecalciferol, 25 MCG (1000 UT) CAPS 1 capsule daily      Coenzyme Q10 (COQ10 PO) 1 tablet daily (Patient not taking: Reported on 3/25/2025)      pantoprazole (PROTONIX) 40 MG EC tablet TAKE 1 TABLET DAILY (Patient not taking: Reported on 3/25/2025) 90 tablet 1    semaglutide-weight management (WEGOVY) 0.25 MG/0.5ML pen Inject 0.5 mLs (0.25 mg) subcutaneously once a week. (Patient not taking: Reported on 3/25/2025) 2 mL 0    Semaglutide-Weight Management (WEGOVY) 0.5 MG/0.5ML pen Inject 0.5 mg subcutaneously once a week. (Patient not taking: Reported on 3/25/2025) 2 mL 0    Semaglutide-Weight Management (WEGOVY) 1 MG/0.5ML pen Inject 1 mg subcutaneously once a week. (Patient not taking: Reported on 3/25/2025) 2 mL 0    TURMERIC PO Take 1 capsule by mouth daily (Patient not taking: Reported on 3/25/2025)         No Known Allergies     Social History     Tobacco Use    Smoking status: Never    Smokeless tobacco: Never   Substance Use Topics    Alcohol use: Yes     Comment: socially on occasion       History   Drug Use Unknown               Objective    /68   Pulse 94   Temp 97.3  F (36.3  C) (Temporal)   Resp 12   Ht 1.64 m (5' 4.57\")   Wt 97.5 kg (215 lb)   SpO2 98%   BMI 36.26 kg/m     Estimated body mass index is 36.26 " "kg/m  as calculated from the following:    Height as of this encounter: 1.64 m (5' 4.57\").    Weight as of this encounter: 97.5 kg (215 lb).  Physical Exam  GENERAL: alert and no distress  EYES: Eyes grossly normal to inspection, PERRL and conjunctivae and sclerae normal  HENT: ear canals and TM's normal, nose and mouth without ulcers or lesions  NECK: no adenopathy, no asymmetry, masses, or scars  RESP: lungs clear to auscultation - no rales, rhonchi or wheezes  CV: regular rate and rhythm, normal S1 S2, no S3 or S4, no murmur, click or rub, no peripheral edema  ABDOMEN: soft, nontender, no hepatosplenomegaly, no masses and bowel sounds normal  MS: no gross musculoskeletal defects noted, no edema  SKIN: no suspicious lesions or rashes  NEURO: Normal strength and tone, mentation intact and speech normal  PSYCH: mentation appears normal, affect normal/bright    Recent Labs   Lab Test 03/04/25  1301 02/24/25  1348 08/02/24  1014   HGB 13.7  --  13.9     --  262   NA  --  139 142   POTASSIUM  --  4.2 4.1   CR  --  1.11* 1.05*   A1C  --  6.3*  --         Diagnostics  No labs were ordered during this visit.   No EKG required, no history of coronary heart disease, significant arrhythmia, peripheral arterial disease or other structural heart disease.    Revised Cardiac Risk Index (RCRI)  The patient has the following serious cardiovascular risks for perioperative complications:   - No serious cardiac risks = 0 points     RCRI Interpretation: 0 points: Class I (very low risk - 0.4% complication rate)         Signed Electronically by: Janey Romero NP  A copy of this evaluation report is provided to the requesting physician.         Answers submitted by the patient for this visit:  Patient Health Questionnaire (Submitted on 3/24/2025)  PHQ9 TOTAL SCORE: 0  Patient Health Questionnaire (G7) (Submitted on 3/25/2025)  ERIC 7 TOTAL SCORE: 0    "

## 2025-03-25 NOTE — PATIENT INSTRUCTIONS
How to Take Your Medication Before Surgery  Preoperative Medication Instructions   Antiplatelet or Anticoagulation Medication Instructions   - We reviewed the medication list and the patient is not on an antiplatelet or anticoagulation medications.    Additional Medication Instructions   - Herbal medications and vitamins: DO NOT TAKE 14 days prior to surgery.   - ACE/ARB/ARNI (lisinopril, enalapril, losartan, valsartan, olmesartan, sacubritril/valsartan) : DO NOT TAKE on day of surgery (minimum 11 hours for general anesthesia).   - Statins (atorvastatin, simvastatin, pravastatin) : Continue taking on the day of surgery.    - GLP-1 Injectable (exenitide, liraglutide, semaglutide, dulaglutide, etc.): DO NOT TAKE 7 days before surgery

## 2025-03-25 NOTE — PROGRESS NOTES
"  {PROVIDER CHARTING PREFERENCE:391818}    Lucía Asher is a 74 year old, presenting for the following health issues:  Pre-Op Exam (CHOLECYSTECTOMY, LAPAROSCOPIC . DOS 03/28/2025. Dr. Salmeron)      3/25/2025     2:48 PM   Additional Questions   Roomed by Christian WELCH      {MA/LPN/RN Pre-Provider Visit Orders- hCG/UA/Strep (Optional):084604}  {SUPERLIST (Optional):914416}  {additonal problems for provider to add (Optional):447413}    {ROS Picklists (Optional):511168}      Objective    /68   Pulse 94   Temp 97.3  F (36.3  C) (Temporal)   Resp 12   Ht 1.64 m (5' 4.57\")   Wt 97.5 kg (215 lb)   SpO2 98%   BMI 36.26 kg/m    Body mass index is 36.26 kg/m .  Physical Exam   {Exam List (Optional):223217}    {Diagnostic Test Results (Optional):582996}        Signed Electronically by: Janey Romero NP  {Email feedback regarding this note to [email or web address]   :798943}  Answers submitted by the patient for this visit:  Patient Health Questionnaire (Submitted on 3/24/2025)  PHQ9 TOTAL SCORE: 0  Patient Health Questionnaire (G7) (Submitted on 3/25/2025)  ERIC 7 TOTAL SCORE: 0    "

## 2025-03-27 ASSESSMENT — ANXIETY QUESTIONNAIRES: GAD7 TOTAL SCORE: 3

## 2025-03-28 ENCOUNTER — HOSPITAL ENCOUNTER (OUTPATIENT)
Facility: CLINIC | Age: 75
Discharge: HOME | End: 2025-03-28
Attending: SPECIALIST | Admitting: SPECIALIST
Payer: MEDICARE

## 2025-03-28 VITALS
RESPIRATION RATE: 16 BRPM | TEMPERATURE: 97.8 F | DIASTOLIC BLOOD PRESSURE: 76 MMHG | BODY MASS INDEX: 36.26 KG/M2 | SYSTOLIC BLOOD PRESSURE: 167 MMHG | OXYGEN SATURATION: 97 % | HEART RATE: 79 BPM | WEIGHT: 215 LBS

## 2025-03-28 DIAGNOSIS — G89.18 POST-OP PAIN: Primary | ICD-10-CM

## 2025-03-28 LAB — GLUCOSE BLDC GLUCOMTR-MCNC: 105 MG/DL (ref 70–99)

## 2025-03-28 PROCEDURE — 370N000017 HC ANESTHESIA TECHNICAL FEE, PER MIN: Performed by: SPECIALIST

## 2025-03-28 PROCEDURE — 272N000001 HC OR GENERAL SUPPLY STERILE: Performed by: SPECIALIST

## 2025-03-28 PROCEDURE — 250N000009 HC RX 250: Performed by: SPECIALIST

## 2025-03-28 PROCEDURE — 250N000013 HC RX MED GY IP 250 OP 250 PS 637: Performed by: SPECIALIST

## 2025-03-28 PROCEDURE — 47562 LAPAROSCOPIC CHOLECYSTECTOMY: CPT | Performed by: SPECIALIST

## 2025-03-28 PROCEDURE — 360N000076 HC SURGERY LEVEL 3, PER MIN: Performed by: SPECIALIST

## 2025-03-28 PROCEDURE — 88304 TISSUE EXAM BY PATHOLOGIST: CPT | Mod: TC | Performed by: SPECIALIST

## 2025-03-28 PROCEDURE — 82962 GLUCOSE BLOOD TEST: CPT

## 2025-03-28 PROCEDURE — 258N000003 HC RX IP 258 OP 636: Performed by: NURSE ANESTHETIST, CERTIFIED REGISTERED

## 2025-03-28 PROCEDURE — 250N000025 HC SEVOFLURANE, PER MIN: Performed by: SPECIALIST

## 2025-03-28 PROCEDURE — 250N000011 HC RX IP 250 OP 636: Performed by: NURSE ANESTHETIST, CERTIFIED REGISTERED

## 2025-03-28 PROCEDURE — 710N000010 HC RECOVERY PHASE 1, LEVEL 2, PER MIN: Performed by: SPECIALIST

## 2025-03-28 PROCEDURE — 999N000141 HC STATISTIC PRE-PROCEDURE NURSING ASSESSMENT: Performed by: SPECIALIST

## 2025-03-28 PROCEDURE — 710N000012 HC RECOVERY PHASE 2, PER MINUTE: Performed by: SPECIALIST

## 2025-03-28 RX ORDER — ONDANSETRON 2 MG/ML
4 INJECTION INTRAMUSCULAR; INTRAVENOUS EVERY 30 MIN PRN
Status: DISCONTINUED | OUTPATIENT
Start: 2025-03-28 | End: 2025-03-28 | Stop reason: HOSPADM

## 2025-03-28 RX ORDER — ACETAMINOPHEN 325 MG/1
975 TABLET ORAL EVERY 6 HOURS PRN
Status: DISCONTINUED | OUTPATIENT
Start: 2025-03-28 | End: 2025-03-28 | Stop reason: HOSPADM

## 2025-03-28 RX ORDER — SODIUM CHLORIDE, SODIUM LACTATE, POTASSIUM CHLORIDE, CALCIUM CHLORIDE 600; 310; 30; 20 MG/100ML; MG/100ML; MG/100ML; MG/100ML
INJECTION, SOLUTION INTRAVENOUS CONTINUOUS
Status: DISCONTINUED | OUTPATIENT
Start: 2025-03-28 | End: 2025-03-28 | Stop reason: HOSPADM

## 2025-03-28 RX ORDER — OXYCODONE AND ACETAMINOPHEN 5; 325 MG/1; MG/1
2 TABLET ORAL
Status: COMPLETED | OUTPATIENT
Start: 2025-03-28 | End: 2025-03-28

## 2025-03-28 RX ORDER — FENTANYL CITRATE 50 UG/ML
25 INJECTION, SOLUTION INTRAMUSCULAR; INTRAVENOUS
Status: DISCONTINUED | OUTPATIENT
Start: 2025-03-28 | End: 2025-03-28 | Stop reason: HOSPADM

## 2025-03-28 RX ORDER — FENTANYL CITRATE 50 UG/ML
50 INJECTION, SOLUTION INTRAMUSCULAR; INTRAVENOUS EVERY 5 MIN PRN
Status: DISCONTINUED | OUTPATIENT
Start: 2025-03-28 | End: 2025-03-28 | Stop reason: HOSPADM

## 2025-03-28 RX ORDER — DEXAMETHASONE SODIUM PHOSPHATE 10 MG/ML
4 INJECTION, SOLUTION INTRAMUSCULAR; INTRAVENOUS
Status: DISCONTINUED | OUTPATIENT
Start: 2025-03-28 | End: 2025-03-28 | Stop reason: HOSPADM

## 2025-03-28 RX ORDER — ONDANSETRON 4 MG/1
4 TABLET, ORALLY DISINTEGRATING ORAL EVERY 30 MIN PRN
Status: DISCONTINUED | OUTPATIENT
Start: 2025-03-28 | End: 2025-03-28 | Stop reason: HOSPADM

## 2025-03-28 RX ORDER — INDOCYANINE GREEN AND WATER 25 MG
1.25 KIT INJECTION ONCE
Status: COMPLETED | OUTPATIENT
Start: 2025-03-28 | End: 2025-03-28

## 2025-03-28 RX ORDER — DEXAMETHASONE SODIUM PHOSPHATE 10 MG/ML
4 INJECTION, SOLUTION INTRAMUSCULAR; INTRAVENOUS
Status: COMPLETED | OUTPATIENT
Start: 2025-03-28 | End: 2025-03-28

## 2025-03-28 RX ORDER — OXYCODONE HYDROCHLORIDE 5 MG/1
5-10 TABLET ORAL EVERY 6 HOURS PRN
Qty: 10 TABLET | Refills: 0 | Status: SHIPPED | OUTPATIENT
Start: 2025-03-28 | End: 2025-03-31

## 2025-03-28 RX ORDER — HYDROMORPHONE HYDROCHLORIDE 1 MG/ML
0.5 INJECTION, SOLUTION INTRAMUSCULAR; INTRAVENOUS; SUBCUTANEOUS EVERY 5 MIN PRN
Status: DISCONTINUED | OUTPATIENT
Start: 2025-03-28 | End: 2025-03-28 | Stop reason: HOSPADM

## 2025-03-28 RX ORDER — HYDROMORPHONE HYDROCHLORIDE 1 MG/ML
0.2 INJECTION, SOLUTION INTRAMUSCULAR; INTRAVENOUS; SUBCUTANEOUS EVERY 5 MIN PRN
Status: DISCONTINUED | OUTPATIENT
Start: 2025-03-28 | End: 2025-03-28 | Stop reason: HOSPADM

## 2025-03-28 RX ORDER — HYDROXYZINE HYDROCHLORIDE 10 MG/1
10 TABLET, FILM COATED ORAL EVERY 6 HOURS PRN
Status: DISCONTINUED | OUTPATIENT
Start: 2025-03-28 | End: 2025-03-28 | Stop reason: HOSPADM

## 2025-03-28 RX ORDER — ALBUTEROL SULFATE 0.83 MG/ML
2.5 SOLUTION RESPIRATORY (INHALATION) EVERY 4 HOURS PRN
Status: DISCONTINUED | OUTPATIENT
Start: 2025-03-28 | End: 2025-03-28 | Stop reason: HOSPADM

## 2025-03-28 RX ORDER — NALOXONE HYDROCHLORIDE 0.4 MG/ML
0.1 INJECTION, SOLUTION INTRAMUSCULAR; INTRAVENOUS; SUBCUTANEOUS
Status: DISCONTINUED | OUTPATIENT
Start: 2025-03-28 | End: 2025-03-28 | Stop reason: HOSPADM

## 2025-03-28 RX ORDER — DIMENHYDRINATE 50 MG/ML
12.5 INJECTION, SOLUTION INTRAMUSCULAR; INTRAVENOUS ONCE
Status: COMPLETED | OUTPATIENT
Start: 2025-03-28 | End: 2025-03-28

## 2025-03-28 RX ORDER — BUPIVACAINE HYDROCHLORIDE AND EPINEPHRINE 2.5; 5 MG/ML; UG/ML
INJECTION, SOLUTION INFILTRATION; PERINEURAL PRN
Status: DISCONTINUED | OUTPATIENT
Start: 2025-03-28 | End: 2025-03-28 | Stop reason: HOSPADM

## 2025-03-28 RX ORDER — OXYCODONE HYDROCHLORIDE 5 MG/1
5 TABLET ORAL
Status: DISCONTINUED | OUTPATIENT
Start: 2025-03-28 | End: 2025-03-28 | Stop reason: HOSPADM

## 2025-03-28 RX ORDER — FENTANYL CITRATE 50 UG/ML
25 INJECTION, SOLUTION INTRAMUSCULAR; INTRAVENOUS EVERY 5 MIN PRN
Status: DISCONTINUED | OUTPATIENT
Start: 2025-03-28 | End: 2025-03-28 | Stop reason: HOSPADM

## 2025-03-28 RX ORDER — CEFAZOLIN SODIUM/WATER 2 G/20 ML
2 SYRINGE (ML) INTRAVENOUS SEE ADMIN INSTRUCTIONS
Status: DISCONTINUED | OUTPATIENT
Start: 2025-03-28 | End: 2025-03-28 | Stop reason: HOSPADM

## 2025-03-28 RX ORDER — CEFAZOLIN SODIUM/WATER 2 G/20 ML
2 SYRINGE (ML) INTRAVENOUS
Status: COMPLETED | OUTPATIENT
Start: 2025-03-28 | End: 2025-03-28

## 2025-03-28 RX ORDER — OXYCODONE HYDROCHLORIDE 5 MG/1
10 TABLET ORAL
Status: DISCONTINUED | OUTPATIENT
Start: 2025-03-28 | End: 2025-03-28 | Stop reason: HOSPADM

## 2025-03-28 RX ADMIN — ONDANSETRON 4 MG: 2 INJECTION, SOLUTION INTRAMUSCULAR; INTRAVENOUS at 11:59

## 2025-03-28 RX ADMIN — DIMENHYDRINATE 12.5 MG: 50 INJECTION, SOLUTION INTRAMUSCULAR; INTRAVENOUS at 13:52

## 2025-03-28 RX ADMIN — FENTANYL CITRATE 25 MCG: 50 INJECTION, SOLUTION INTRAMUSCULAR; INTRAVENOUS at 12:11

## 2025-03-28 RX ADMIN — Medication 1.25 MG: at 09:56

## 2025-03-28 RX ADMIN — OXYCODONE HYDROCHLORIDE AND ACETAMINOPHEN 1 TABLET: 5; 325 TABLET ORAL at 12:53

## 2025-03-28 RX ADMIN — FENTANYL CITRATE 25 MCG: 50 INJECTION, SOLUTION INTRAMUSCULAR; INTRAVENOUS at 12:24

## 2025-03-28 RX ADMIN — SODIUM CHLORIDE, SODIUM LACTATE, POTASSIUM CHLORIDE, AND CALCIUM CHLORIDE: .6; .31; .03; .02 INJECTION, SOLUTION INTRAVENOUS at 09:03

## 2025-03-28 RX ADMIN — FENTANYL CITRATE 25 MCG: 50 INJECTION, SOLUTION INTRAMUSCULAR; INTRAVENOUS at 12:37

## 2025-03-28 ASSESSMENT — ACTIVITIES OF DAILY LIVING (ADL)
ADLS_ACUITY_SCORE: 41

## 2025-03-28 NOTE — OP NOTE
[facility] Operative Note    Pre-operative diagnosis: Chronic cholecystitis [K81.1]   Post-operative diagnosis: Same  Biliary colic   Procedure: Procedure(s):  CHOLECYSTECTOMY, LAPAROSCOPIC   Surgeon: Carter Salmeron MD, FACS   Assistant(s): None   Anesthesia: General endotracheal anesthesia   Estimated blood loss: Less than 10 ml   Total IV fluids: (See anesthesia record)   Blood transfusion: No transfusion was given during surgery   Total urine output: (See anesthesia record)   Drains: None   Specimens: Gallbladder and contents   Implants: None   Findings: Gallbladder with multiple stones   Complications: None   Condition: Stable   Comments: Indications procedure: This is 74 lady presented right upper quadrant pain rating to her back.  An image she had gallstones.  On exam she did have some minimal tenderness raising the possibility of chronic cholecystitis.  For this reason elected For Laparoscopic Cholecystectomy.       Details of procedure:  Patient was taken the operating room placed the table in supine position.  After induction general anesthesia the abdomen prepped and draped in sterile fashion.  A timeout was performed confirm the identity of the patient as well as the procedure performed.  A supraumbilical incision was then made an 11 mm Visiport was inserted the abdomen to direct visualization.  The abs insufflated 15 mL mercury pressure.  Generalized inspection the ab was then carried out and the gallbladder was readily seen in the right upper quadrant.  There is no evidence of any injury from insertion of the Visiport.  2 right upper quadrant 5 mm trocars and 11 mm subxiphoid trocar placed.  The gallbladder is grasped pulled cephalad.  The base the gallbladder was also grasped.  There is a lot of adipose tissue on the gallbladder this was taken down using a ConMed dissector and [place] dissector.  During this dissection the node of Calot lobe was identified and cauterized.  Also the cystic  artery was cauterized during this dissection.  ICG was used to identify the common duct which we were well away from.  A window was created under the gallbladder DEBORA identifying the cystic duct.  We are imaged with the ICG and the common duct again was seen to be posterior to this.  The cystic duct was clipped and transected.  The gallbladder was removed and a liver bed using cautery.  The posterior branch was identified and the mesentery of this was clipped during this dissection after checking with ICG to ensure was not an aberrant duct.  The remainder the gallbladder was moved the liver bed using cautery and the abdomen using an Endo Catch bag.  The areas copiously irrigated all fluid was suctioned out.  Liver bed was inspected without ends any bleeding.  The subxiphoid trocar is removed and the fascia was closed using a cone, PMI needle #1 PDS.  The two 5 mm trocars were removed without evidence of any bleeding.  The supraumbilical trocar is removed and the fascia was closed using a single stitch #1 PDS.  All skin incisions were closed using 3-0 Vicryl and Dermabond glue.  Patient was taken from the operative to recovery in stable condition to be sent home.    Carter Salmeron MD, FACS

## 2025-03-31 ENCOUNTER — MYC REFILL (OUTPATIENT)
Dept: FAMILY MEDICINE | Facility: CLINIC | Age: 75
End: 2025-03-31
Payer: MEDICARE

## 2025-03-31 DIAGNOSIS — G89.18 POST-OP PAIN: ICD-10-CM

## 2025-03-31 RX ORDER — OXYCODONE HYDROCHLORIDE 5 MG/1
5 TABLET ORAL EVERY 6 HOURS PRN
Qty: 10 TABLET | Refills: 0 | Status: SHIPPED | OUTPATIENT
Start: 2025-03-31

## 2025-03-31 NOTE — TELEPHONE ENCOUNTER
Patient calling today requesting a refill of pain medication.      Patient underwent laparoscopic cholecystectomy on 3/28/25 with Dr. Salmeron.       Patient denies any recent injury or signs and symptoms related to infection.     Patient reports pain is currently 7/10.     Patient currently taking:   Patient is not taking OTC pain medication. Educated patient on managing pain with OTC medication.    Patient utilizing the following at home interventions:   Resting    History of narcotic fills for this issue:   No previous refills  In additional to all other unrelated medications reflected on their medication list.     Patient has 1.5 tabs remaining.     Patient uses [facility] pharmacy.     Next follow up appointment: 4/7/25    Mariann Neal RN on 3/31/2025 at 2:35 PM

## 2025-04-01 LAB
PATH REPORT.COMMENTS IMP SPEC: NORMAL
PATH REPORT.COMMENTS IMP SPEC: NORMAL
PATH REPORT.FINAL DX SPEC: NORMAL
PATH REPORT.GROSS SPEC: NORMAL
PATH REPORT.MICROSCOPIC SPEC OTHER STN: NORMAL
PATH REPORT.RELEVANT HX SPEC: NORMAL
PHOTO IMAGE: NORMAL

## 2025-04-01 PROCEDURE — 88304 TISSUE EXAM BY PATHOLOGIST: CPT | Mod: 26 | Performed by: PATHOLOGY

## 2025-04-07 ENCOUNTER — OFFICE VISIT (OUTPATIENT)
Dept: SURGERY | Facility: CLINIC | Age: 75
End: 2025-04-07
Payer: MEDICARE

## 2025-04-07 VITALS
BODY MASS INDEX: 36.26 KG/M2 | WEIGHT: 215 LBS | DIASTOLIC BLOOD PRESSURE: 82 MMHG | TEMPERATURE: 97.4 F | SYSTOLIC BLOOD PRESSURE: 122 MMHG

## 2025-04-07 DIAGNOSIS — Z09 POSTOP CHECK: Primary | ICD-10-CM

## 2025-04-07 PROCEDURE — 3079F DIAST BP 80-89 MM HG: CPT | Performed by: SPECIALIST

## 2025-04-07 PROCEDURE — 99024 POSTOP FOLLOW-UP VISIT: CPT | Performed by: SPECIALIST

## 2025-04-07 PROCEDURE — 3074F SYST BP LT 130 MM HG: CPT | Performed by: SPECIALIST

## 2025-04-07 PROCEDURE — 1126F AMNT PAIN NOTED NONE PRSNT: CPT | Performed by: SPECIALIST

## 2025-04-07 ASSESSMENT — PAIN SCALES - GENERAL: PAINLEVEL_OUTOF10: NO PAIN (0)

## 2025-04-07 NOTE — LETTER
4/7/2025      Shelly Carcamo  [address]  [place] [facility] [address]      Dear Colleague,    Thank you for referring your patient, Shlely Carcamo, to the [facility]. Please see a copy of my visit note below.    F/U for lap gena.    Subjective:  Pt feels good.  Pre-op sx have resolved.    Objective:  B/P: 122/82, T: 97.4, P: Data Unavailable, R: Data Unavailable  Abd: Soft, non tender, non distended    Assessment/Plan:  Pt s/p lap gena. Doing well.  Pre-op sx have resolved.  Patient will increase their activity as fatty food intake as tolerated.  F/U with me PRN.    Carter Salmeron MD, FACS      Again, thank you for allowing me to participate in the care of your patient.        Sincerely,        Carter Salmeron MD    Electronically signed

## 2025-04-07 NOTE — PROGRESS NOTES
F/U for lap gena.    Subjective:  Pt feels good.  Pre-op sx have resolved.    Objective:  B/P: 122/82, T: 97.4, P: Data Unavailable, R: Data Unavailable  Abd: Soft, non tender, non distended    Assessment/Plan:  Pt s/p lap gena. Doing well.  Pre-op sx have resolved.  Patient will increase their activity as fatty food intake as tolerated.  F/U with me PRN.    Carter Salmeron MD, FACS

## 2025-04-22 ENCOUNTER — MYC REFILL (OUTPATIENT)
Dept: FAMILY MEDICINE | Facility: CLINIC | Age: 75
End: 2025-04-22
Payer: MEDICARE

## 2025-04-22 ENCOUNTER — TELEPHONE (OUTPATIENT)
Dept: FAMILY MEDICINE | Facility: CLINIC | Age: 75
End: 2025-04-22
Payer: MEDICARE

## 2025-04-22 DIAGNOSIS — F41.9 ANXIETY: ICD-10-CM

## 2025-04-22 DIAGNOSIS — E66.01 MORBID OBESITY (H): ICD-10-CM

## 2025-04-22 DIAGNOSIS — R73.03 PREDIABETES: ICD-10-CM

## 2025-04-22 DIAGNOSIS — M47.26 OSTEOARTHRITIS OF SPINE WITH RADICULOPATHY, LUMBAR REGION: ICD-10-CM

## 2025-04-22 DIAGNOSIS — E66.01 MORBID OBESITY (H): Primary | ICD-10-CM

## 2025-04-22 RX ORDER — HYDROCODONE BITARTRATE AND ACETAMINOPHEN 5; 325 MG/1; MG/1
1 TABLET ORAL EVERY 6 HOURS PRN
Qty: 30 TABLET | Refills: 0 | Status: SHIPPED | OUTPATIENT
Start: 2025-04-22

## 2025-04-22 RX ORDER — VENLAFAXINE HYDROCHLORIDE 75 MG/1
75 CAPSULE, EXTENDED RELEASE ORAL DAILY
Qty: 90 CAPSULE | Refills: 1 | OUTPATIENT
Start: 2025-04-22

## 2025-04-23 NOTE — TELEPHONE ENCOUNTER
"We received a prescription for wegovy 1.7 mg tonight however, the last dose that the pt has received is the 0.5 mg. I do see that a 1mg strength was written and sent on 3/17/25 but then was \"PPI\" canceled. Please resend a new rx for a strength of 1mg, for that is her next step.    Thank you,  Laurie Tony, Pharmacy Technician  [facility]    "

## 2025-06-03 DIAGNOSIS — I10 ESSENTIAL HYPERTENSION: ICD-10-CM

## 2025-06-03 RX ORDER — LOSARTAN POTASSIUM 50 MG/1
TABLET ORAL
Qty: 180 TABLET | Refills: 3 | Status: SHIPPED | OUTPATIENT
Start: 2025-06-03

## 2025-06-13 ENCOUNTER — MYC REFILL (OUTPATIENT)
Dept: FAMILY MEDICINE | Facility: CLINIC | Age: 75
End: 2025-06-13
Payer: MEDICARE

## 2025-06-13 DIAGNOSIS — M47.26 OSTEOARTHRITIS OF SPINE WITH RADICULOPATHY, LUMBAR REGION: ICD-10-CM

## 2025-06-16 RX ORDER — HYDROCODONE BITARTRATE AND ACETAMINOPHEN 5; 325 MG/1; MG/1
1 TABLET ORAL EVERY 6 HOURS PRN
Qty: 30 TABLET | Refills: 0 | Status: SHIPPED | OUTPATIENT
Start: 2025-06-16

## 2025-06-20 ENCOUNTER — MYC MEDICAL ADVICE (OUTPATIENT)
Dept: FAMILY MEDICINE | Facility: CLINIC | Age: 75
End: 2025-06-20
Payer: MEDICARE

## 2025-06-20 DIAGNOSIS — R73.03 PREDIABETES: ICD-10-CM

## 2025-06-20 DIAGNOSIS — E66.01 MORBID OBESITY (H): ICD-10-CM

## 2025-07-10 ENCOUNTER — MYC REFILL (OUTPATIENT)
Dept: FAMILY MEDICINE | Facility: CLINIC | Age: 75
End: 2025-07-10
Payer: MEDICARE

## 2025-07-10 DIAGNOSIS — K21.00 GASTROESOPHAGEAL REFLUX DISEASE WITH ESOPHAGITIS WITHOUT HEMORRHAGE: ICD-10-CM

## 2025-07-10 RX ORDER — FAMOTIDINE 20 MG/1
20 TABLET, FILM COATED ORAL 2 TIMES DAILY
Qty: 180 TABLET | Refills: 3 | OUTPATIENT
Start: 2025-07-10

## 2025-07-21 ENCOUNTER — TELEPHONE (OUTPATIENT)
Dept: FAMILY MEDICINE | Facility: CLINIC | Age: 75
End: 2025-07-21
Payer: MEDICARE

## 2025-07-21 DIAGNOSIS — M47.26 OSTEOARTHRITIS OF SPINE WITH RADICULOPATHY, LUMBAR REGION: ICD-10-CM

## 2025-07-21 NOTE — TELEPHONE ENCOUNTER
Patient requesting oxycodone 5mg refill please from Gregoria     Thank you,   Gely Almonte,   Pharm Tech,  [facility]    declines

## 2025-07-22 RX ORDER — HYDROCODONE BITARTRATE AND ACETAMINOPHEN 5; 325 MG/1; MG/1
1 TABLET ORAL EVERY 6 HOURS PRN
Qty: 30 TABLET | Refills: 0 | Status: SHIPPED | OUTPATIENT
Start: 2025-07-22

## 2025-07-22 NOTE — TELEPHONE ENCOUNTER
I'm assuming this was meant to be her Hydrocodone?? I did send a refill on this (this is what she normally takes - she has just had Oxycodone following her surgeries). Please advise if I am incorrect with her request.    Gregoria Kumari PA-C

## 2025-08-06 ENCOUNTER — TELEPHONE (OUTPATIENT)
Dept: FAMILY MEDICINE | Facility: CLINIC | Age: 75
End: 2025-08-06
Payer: MEDICARE

## 2025-08-06 DIAGNOSIS — R73.03 PREDIABETES: Primary | ICD-10-CM

## 2025-08-06 DIAGNOSIS — E66.01 MORBID OBESITY (H): ICD-10-CM

## 2025-08-06 DIAGNOSIS — I10 ESSENTIAL HYPERTENSION: ICD-10-CM

## 2025-08-11 ENCOUNTER — TELEPHONE (OUTPATIENT)
Dept: FAMILY MEDICINE | Facility: CLINIC | Age: 75
End: 2025-08-11
Payer: MEDICARE

## 2025-08-13 ENCOUNTER — MYC REFILL (OUTPATIENT)
Dept: FAMILY MEDICINE | Facility: CLINIC | Age: 75
End: 2025-08-13
Payer: MEDICARE

## 2025-08-13 DIAGNOSIS — M47.26 OSTEOARTHRITIS OF SPINE WITH RADICULOPATHY, LUMBAR REGION: ICD-10-CM

## 2025-08-13 RX ORDER — HYDROCODONE BITARTRATE AND ACETAMINOPHEN 5; 325 MG/1; MG/1
1 TABLET ORAL EVERY 6 HOURS PRN
Qty: 30 TABLET | Refills: 0 | Status: SHIPPED | OUTPATIENT
Start: 2025-08-13

## 2025-08-28 SDOH — HEALTH STABILITY: PHYSICAL HEALTH: ON AVERAGE, HOW MANY DAYS PER WEEK DO YOU ENGAGE IN MODERATE TO STRENUOUS EXERCISE (LIKE A BRISK WALK)?: 2 DAYS

## 2025-08-28 SDOH — HEALTH STABILITY: PHYSICAL HEALTH: ON AVERAGE, HOW MANY MINUTES DO YOU ENGAGE IN EXERCISE AT THIS LEVEL?: 20 MIN

## 2025-09-02 ENCOUNTER — OFFICE VISIT (OUTPATIENT)
Dept: FAMILY MEDICINE | Facility: CLINIC | Age: 75
End: 2025-09-02
Attending: PHYSICIAN ASSISTANT
Payer: MEDICARE

## 2025-09-02 ENCOUNTER — MYC MEDICAL ADVICE (OUTPATIENT)
Dept: FAMILY MEDICINE | Facility: CLINIC | Age: 75
End: 2025-09-02

## 2025-09-02 VITALS
SYSTOLIC BLOOD PRESSURE: 124 MMHG | WEIGHT: 182.5 LBS | HEART RATE: 90 BPM | DIASTOLIC BLOOD PRESSURE: 76 MMHG | RESPIRATION RATE: 18 BRPM | BODY MASS INDEX: 30.41 KG/M2 | OXYGEN SATURATION: 98 % | TEMPERATURE: 98 F | HEIGHT: 65 IN

## 2025-09-02 DIAGNOSIS — K21.00 GASTROESOPHAGEAL REFLUX DISEASE WITH ESOPHAGITIS WITHOUT HEMORRHAGE: ICD-10-CM

## 2025-09-02 DIAGNOSIS — F41.9 ANXIETY: ICD-10-CM

## 2025-09-02 DIAGNOSIS — I10 ESSENTIAL HYPERTENSION: ICD-10-CM

## 2025-09-02 DIAGNOSIS — F51.02 ADJUSTMENT INSOMNIA: ICD-10-CM

## 2025-09-02 DIAGNOSIS — E78.2 MIXED HYPERLIPIDEMIA: ICD-10-CM

## 2025-09-02 DIAGNOSIS — E78.00 HIGH CHOLESTEROL: ICD-10-CM

## 2025-09-02 DIAGNOSIS — I10 PRIMARY HYPERTENSION: ICD-10-CM

## 2025-09-02 DIAGNOSIS — Z00.00 ENCOUNTER FOR MEDICARE ANNUAL WELLNESS EXAM: Primary | ICD-10-CM

## 2025-09-02 DIAGNOSIS — R73.03 PREDIABETES: ICD-10-CM

## 2025-09-02 DIAGNOSIS — Z13.29 SCREENING FOR THYROID DISORDER: ICD-10-CM

## 2025-09-02 LAB
ANION GAP SERPL CALCULATED.3IONS-SCNC: 13 MMOL/L (ref 7–15)
BUN SERPL-MCNC: 24.6 MG/DL (ref 8–23)
CALCIUM SERPL-MCNC: 9.8 MG/DL (ref 8.8–10.4)
CHLORIDE SERPL-SCNC: 98 MMOL/L (ref 98–107)
CHOLEST SERPL-MCNC: 187 MG/DL
CREAT SERPL-MCNC: 1.02 MG/DL (ref 0.51–0.95)
EGFRCR SERPLBLD CKD-EPI 2021: 58 ML/MIN/1.73M2
EST. AVERAGE GLUCOSE BLD GHB EST-MCNC: 120 MG/DL
FASTING STATUS PATIENT QL REPORTED: NO
FASTING STATUS PATIENT QL REPORTED: NO
GLUCOSE SERPL-MCNC: 97 MG/DL (ref 70–99)
HBA1C MFR BLD: 5.8 %
HCO3 SERPL-SCNC: 25 MMOL/L (ref 22–29)
HDLC SERPL-MCNC: 91 MG/DL
LDLC SERPL CALC-MCNC: 65 MG/DL
NONHDLC SERPL-MCNC: 96 MG/DL
POTASSIUM SERPL-SCNC: 4.3 MMOL/L (ref 3.4–5.3)
SODIUM SERPL-SCNC: 136 MMOL/L (ref 135–145)
TRIGL SERPL-MCNC: 156 MG/DL
TSH SERPL DL<=0.005 MIU/L-ACNC: 2.59 UIU/ML (ref 0.3–4.2)

## 2025-09-02 PROCEDURE — 36415 COLL VENOUS BLD VENIPUNCTURE: CPT | Performed by: PHYSICIAN ASSISTANT

## 2025-09-02 PROCEDURE — 80061 LIPID PANEL: CPT | Performed by: PHYSICIAN ASSISTANT

## 2025-09-02 PROCEDURE — 83036 HEMOGLOBIN GLYCOSYLATED A1C: CPT | Performed by: PHYSICIAN ASSISTANT

## 2025-09-02 PROCEDURE — 84443 ASSAY THYROID STIM HORMONE: CPT | Performed by: PHYSICIAN ASSISTANT

## 2025-09-02 PROCEDURE — 80048 BASIC METABOLIC PNL TOTAL CA: CPT | Performed by: PHYSICIAN ASSISTANT

## 2025-09-02 RX ORDER — ZOLPIDEM TARTRATE 5 MG/1
5 TABLET ORAL
Qty: 30 TABLET | Refills: 3 | Status: SHIPPED | OUTPATIENT
Start: 2025-09-02

## 2025-09-02 ASSESSMENT — PAIN SCALES - GENERAL: PAINLEVEL_OUTOF10: NO PAIN (0)

## 2025-09-03 ENCOUNTER — TELEPHONE (OUTPATIENT)
Dept: FAMILY MEDICINE | Facility: CLINIC | Age: 75
End: 2025-09-03
Payer: MEDICARE

## 2025-09-03 DIAGNOSIS — E78.2 MIXED HYPERLIPIDEMIA: ICD-10-CM

## 2025-09-03 DIAGNOSIS — K21.00 GASTROESOPHAGEAL REFLUX DISEASE WITH ESOPHAGITIS WITHOUT HEMORRHAGE: ICD-10-CM

## 2025-09-03 DIAGNOSIS — I10 ESSENTIAL HYPERTENSION: ICD-10-CM

## 2025-09-03 DIAGNOSIS — F41.9 ANXIETY: ICD-10-CM

## 2025-09-03 RX ORDER — LOSARTAN POTASSIUM 50 MG/1
TABLET ORAL
Qty: 180 TABLET | Refills: 3 | Status: SHIPPED | OUTPATIENT
Start: 2025-09-03 | End: 2025-09-04

## 2025-09-03 RX ORDER — LOSARTAN POTASSIUM 50 MG/1
TABLET ORAL
Qty: 180 TABLET | Refills: 3 | Status: SHIPPED | OUTPATIENT
Start: 2025-09-03 | End: 2025-09-03

## 2025-09-03 RX ORDER — CARVEDILOL 6.25 MG/1
6.25 TABLET ORAL 2 TIMES DAILY WITH MEALS
Qty: 180 TABLET | Refills: 2 | Status: SHIPPED | OUTPATIENT
Start: 2025-09-03 | End: 2025-09-03

## 2025-09-03 RX ORDER — FAMOTIDINE 20 MG/1
20 TABLET, FILM COATED ORAL 2 TIMES DAILY
Qty: 180 TABLET | Refills: 3 | Status: SHIPPED | OUTPATIENT
Start: 2025-09-03 | End: 2025-09-04

## 2025-09-03 RX ORDER — VENLAFAXINE HYDROCHLORIDE 75 MG/1
75 CAPSULE, EXTENDED RELEASE ORAL DAILY
Qty: 90 CAPSULE | Refills: 1 | Status: SHIPPED | OUTPATIENT
Start: 2025-09-03 | End: 2025-09-04

## 2025-09-03 RX ORDER — ATORVASTATIN CALCIUM 20 MG/1
20 TABLET, FILM COATED ORAL DAILY
Qty: 90 TABLET | Refills: 1 | Status: SHIPPED | OUTPATIENT
Start: 2025-09-03 | End: 2025-09-03

## 2025-09-03 RX ORDER — VENLAFAXINE HYDROCHLORIDE 75 MG/1
75 CAPSULE, EXTENDED RELEASE ORAL DAILY
Qty: 90 CAPSULE | Refills: 1 | Status: SHIPPED | OUTPATIENT
Start: 2025-09-03 | End: 2025-09-03

## 2025-09-03 RX ORDER — FAMOTIDINE 20 MG/1
20 TABLET, FILM COATED ORAL 2 TIMES DAILY
Qty: 180 TABLET | Refills: 3 | Status: SHIPPED | OUTPATIENT
Start: 2025-09-03 | End: 2025-09-03

## 2025-09-03 RX ORDER — ATORVASTATIN CALCIUM 20 MG/1
20 TABLET, FILM COATED ORAL DAILY
Qty: 90 TABLET | Refills: 1 | Status: SHIPPED | OUTPATIENT
Start: 2025-09-03 | End: 2025-09-04

## 2025-09-03 RX ORDER — CARVEDILOL 6.25 MG/1
6.25 TABLET ORAL 2 TIMES DAILY WITH MEALS
Qty: 180 TABLET | Refills: 2 | Status: SHIPPED | OUTPATIENT
Start: 2025-09-03 | End: 2025-09-04

## 2025-09-04 RX ORDER — LOSARTAN POTASSIUM 50 MG/1
50 TABLET ORAL DAILY
Qty: 90 TABLET | Refills: 4 | Status: SHIPPED | OUTPATIENT
Start: 2025-09-04

## 2025-09-04 RX ORDER — FAMOTIDINE 20 MG/1
20 TABLET, FILM COATED ORAL 2 TIMES DAILY
Qty: 180 TABLET | Refills: 3 | Status: SHIPPED | OUTPATIENT
Start: 2025-09-04

## 2025-09-04 RX ORDER — CARVEDILOL 6.25 MG/1
6.25 TABLET ORAL 2 TIMES DAILY WITH MEALS
Qty: 180 TABLET | Refills: 3 | Status: SHIPPED | OUTPATIENT
Start: 2025-09-04

## 2025-09-04 RX ORDER — ATORVASTATIN CALCIUM 20 MG/1
20 TABLET, FILM COATED ORAL DAILY
Qty: 90 TABLET | Refills: 4 | Status: SHIPPED | OUTPATIENT
Start: 2025-09-04

## 2025-09-04 RX ORDER — VENLAFAXINE HYDROCHLORIDE 75 MG/1
75 CAPSULE, EXTENDED RELEASE ORAL DAILY
Qty: 90 CAPSULE | Refills: 4 | Status: SHIPPED | OUTPATIENT
Start: 2025-09-04

## (undated) DEVICE — GLOVE BIOGEL PI ULTRATOUCH G SZ 7.5 42175

## (undated) DEVICE — SU MONOCRYL 4-0 PS-2 18" UND Y496G

## (undated) DEVICE — ESU SUCTION/IRRIGATION SYSTEM PISTOL GRIP

## (undated) DEVICE — ESU HOOK TIP 5MM CONMED

## (undated) DEVICE — ESU ENDO SCISSORS 5MM CVD 5DCS

## (undated) DEVICE — ENDO TROCAR SLEEVE KII Z-THREADED 05X100MM CTS02

## (undated) DEVICE — DEVICE SUTURE GRASPER TROCAR CLOSURE 14GA PMITCSG

## (undated) DEVICE — ENDO CLIP CARTIRDGE K2 MED/LG 10 1112

## (undated) DEVICE — ENDO TROCAR FIRST ENTRY KII FIOS Z-THRD 11X100MM CTF33

## (undated) DEVICE — DEVICE ACTIVE LAP PLUME FILTERATION PUREVIEW 620030100

## (undated) DEVICE — SU DERMABOND ADVANCED .7ML DNX12

## (undated) DEVICE — PACK GENERAL LAPAOSCOPY

## (undated) DEVICE — SUCTION MANIFOLD NEPTUNE 2 SYS 4 PORT 0702-020-000

## (undated) DEVICE — ESU GROUND PAD UNIVERSAL W/O CORD

## (undated) DEVICE — SOL NACL 0.9% INJ 1000ML BAG 07983-09

## (undated) DEVICE — ENDO POUCH UNIV RETRIEVAL SYSTEM INZII 10MM CD001

## (undated) DEVICE — SU PDS II 1 CT-1 MONOFIL Z347H

## (undated) DEVICE — ENDO TROCAR FIRST ENTRY KII FIOS Z-THRD 05X100MM CTF03

## (undated) DEVICE — SU VICRYL 3-0 SH 27" UND J416H

## (undated) DEVICE — ESU CORD MONOPOLAR HIGH FREQUENCY 26006M-D/10

## (undated) RX ORDER — PROPOFOL 10 MG/ML
INJECTION, EMULSION INTRAVENOUS
Status: DISPENSED
Start: 2025-03-28

## (undated) RX ORDER — BUPIVACAINE HYDROCHLORIDE AND EPINEPHRINE 2.5; 5 MG/ML; UG/ML
INJECTION, SOLUTION EPIDURAL; INFILTRATION; INTRACAUDAL; PERINEURAL
Status: DISPENSED
Start: 2025-03-28

## (undated) RX ORDER — ONDANSETRON 2 MG/ML
INJECTION INTRAMUSCULAR; INTRAVENOUS
Status: DISPENSED
Start: 2025-03-28

## (undated) RX ORDER — KETOROLAC TROMETHAMINE 30 MG/ML
INJECTION, SOLUTION INTRAMUSCULAR; INTRAVENOUS
Status: DISPENSED
Start: 2025-03-28

## (undated) RX ORDER — FENTANYL CITRATE 50 UG/ML
INJECTION, SOLUTION INTRAMUSCULAR; INTRAVENOUS
Status: DISPENSED
Start: 2025-03-28

## (undated) RX ORDER — DEXAMETHASONE SODIUM PHOSPHATE 10 MG/ML
INJECTION, SOLUTION INTRAMUSCULAR; INTRAVENOUS
Status: DISPENSED
Start: 2025-03-28